# Patient Record
Sex: MALE | Race: WHITE | NOT HISPANIC OR LATINO | Employment: FULL TIME | URBAN - METROPOLITAN AREA
[De-identification: names, ages, dates, MRNs, and addresses within clinical notes are randomized per-mention and may not be internally consistent; named-entity substitution may affect disease eponyms.]

---

## 2019-03-18 ENCOUNTER — HOSPITAL ENCOUNTER (EMERGENCY)
Facility: HOSPITAL | Age: 29
Discharge: HOME | End: 2019-03-18
Attending: EMERGENCY MEDICINE | Admitting: EMERGENCY MEDICINE

## 2019-03-18 VITALS
TEMPERATURE: 99 F | DIASTOLIC BLOOD PRESSURE: 72 MMHG | SYSTOLIC BLOOD PRESSURE: 144 MMHG | WEIGHT: 299.83 LBS | RESPIRATION RATE: 18 BRPM | OXYGEN SATURATION: 99 % | HEART RATE: 95 BPM

## 2019-03-18 DIAGNOSIS — L03.313 CELLULITIS OF CHEST WALL: Primary | ICD-10-CM

## 2019-03-18 LAB
ALBUMIN SERPL BCP-MCNC: 3.5 G/DL (ref 3.5–5)
ALP SERPL-CCNC: 80 U/L (ref 46–116)
ALT SERPL W P-5'-P-CCNC: 74 U/L (ref 12–78)
ANION GAP SERPL CALCULATED.3IONS-SCNC: 10 MMOL/L (ref 4–13)
AST SERPL W P-5'-P-CCNC: 45 U/L (ref 5–45)
BASOPHILS # BLD AUTO: 0.03 THOUSANDS/ΜL (ref 0–0.1)
BASOPHILS NFR BLD AUTO: 0 % (ref 0–1)
BILIRUB DIRECT SERPL-MCNC: 0.17 MG/DL (ref 0–0.2)
BILIRUB SERPL-MCNC: 0.58 MG/DL (ref 0.2–1)
BUN SERPL-MCNC: 9 MG/DL (ref 5–25)
CALCIUM SERPL-MCNC: 9.1 MG/DL (ref 8.3–10.1)
CHLORIDE SERPL-SCNC: 101 MMOL/L (ref 100–108)
CO2 SERPL-SCNC: 25 MMOL/L (ref 21–32)
CREAT SERPL-MCNC: 0.96 MG/DL (ref 0.6–1.3)
EOSINOPHIL # BLD AUTO: 0 THOUSAND/ΜL (ref 0–0.61)
EOSINOPHIL NFR BLD AUTO: 0 % (ref 0–6)
ERYTHROCYTE [DISTWIDTH] IN BLOOD BY AUTOMATED COUNT: 13.3 % (ref 11.6–15.1)
GFR SERPL CREATININE-BSD FRML MDRD: 106 ML/MIN/1.73SQ M
GLUCOSE SERPL-MCNC: 85 MG/DL (ref 65–140)
HCT VFR BLD AUTO: 47.6 % (ref 36.5–49.3)
HGB BLD-MCNC: 15.8 G/DL (ref 12–17)
IMM GRANULOCYTES # BLD AUTO: 0.1 THOUSAND/UL (ref 0–0.2)
IMM GRANULOCYTES NFR BLD AUTO: 1 % (ref 0–2)
LYMPHOCYTES # BLD AUTO: 1.29 THOUSANDS/ΜL (ref 0.6–4.47)
LYMPHOCYTES NFR BLD AUTO: 16 % (ref 14–44)
MAGNESIUM SERPL-MCNC: 2 MG/DL (ref 1.6–2.6)
MCH RBC QN AUTO: 29.1 PG (ref 26.8–34.3)
MCHC RBC AUTO-ENTMCNC: 33.2 G/DL (ref 31.4–37.4)
MCV RBC AUTO: 88 FL (ref 82–98)
MONOCYTES # BLD AUTO: 0.77 THOUSAND/ΜL (ref 0.17–1.22)
MONOCYTES NFR BLD AUTO: 10 % (ref 4–12)
NEUTROPHILS # BLD AUTO: 5.77 THOUSANDS/ΜL (ref 1.85–7.62)
NEUTS SEG NFR BLD AUTO: 73 % (ref 43–75)
NRBC BLD AUTO-RTO: 0 /100 WBCS
PLATELET # BLD AUTO: 193 THOUSANDS/UL (ref 149–390)
PMV BLD AUTO: 9.6 FL (ref 8.9–12.7)
POTASSIUM SERPL-SCNC: 3.9 MMOL/L (ref 3.5–5.3)
PROT SERPL-MCNC: 8.2 G/DL (ref 6.4–8.2)
RBC # BLD AUTO: 5.43 MILLION/UL (ref 3.88–5.62)
SODIUM SERPL-SCNC: 136 MMOL/L (ref 136–145)
WBC # BLD AUTO: 7.96 THOUSAND/UL (ref 4.31–10.16)

## 2019-03-18 PROCEDURE — 96375 TX/PRO/DX INJ NEW DRUG ADDON: CPT

## 2019-03-18 PROCEDURE — 99283 EMERGENCY DEPT VISIT LOW MDM: CPT

## 2019-03-18 PROCEDURE — 36415 COLL VENOUS BLD VENIPUNCTURE: CPT | Performed by: EMERGENCY MEDICINE

## 2019-03-18 PROCEDURE — 80048 BASIC METABOLIC PNL TOTAL CA: CPT | Performed by: EMERGENCY MEDICINE

## 2019-03-18 PROCEDURE — 83735 ASSAY OF MAGNESIUM: CPT | Performed by: EMERGENCY MEDICINE

## 2019-03-18 PROCEDURE — 80076 HEPATIC FUNCTION PANEL: CPT | Performed by: EMERGENCY MEDICINE

## 2019-03-18 PROCEDURE — 85025 COMPLETE CBC W/AUTO DIFF WBC: CPT | Performed by: EMERGENCY MEDICINE

## 2019-03-18 PROCEDURE — 96361 HYDRATE IV INFUSION ADD-ON: CPT

## 2019-03-18 PROCEDURE — 96365 THER/PROPH/DIAG IV INF INIT: CPT

## 2019-03-18 RX ORDER — SULFAMETHOXAZOLE AND TRIMETHOPRIM 800; 160 MG/1; MG/1
1 TABLET ORAL 2 TIMES DAILY
Qty: 20 TABLET | Refills: 0 | Status: SHIPPED | OUTPATIENT
Start: 2019-03-18 | End: 2019-03-24 | Stop reason: HOSPADM

## 2019-03-18 RX ORDER — CLINDAMYCIN PHOSPHATE 600 MG/50ML
600 INJECTION INTRAVENOUS ONCE
Status: COMPLETED | OUTPATIENT
Start: 2019-03-18 | End: 2019-03-18

## 2019-03-18 RX ORDER — CEPHALEXIN 500 MG/1
500 CAPSULE ORAL EVERY 6 HOURS SCHEDULED
Qty: 40 CAPSULE | Refills: 0 | Status: ON HOLD | OUTPATIENT
Start: 2019-03-18 | End: 2019-03-24 | Stop reason: SDUPTHER

## 2019-03-18 RX ORDER — KETOROLAC TROMETHAMINE 30 MG/ML
15 INJECTION, SOLUTION INTRAMUSCULAR; INTRAVENOUS ONCE
Status: COMPLETED | OUTPATIENT
Start: 2019-03-18 | End: 2019-03-18

## 2019-03-18 RX ADMIN — SODIUM CHLORIDE 1000 ML: 0.9 INJECTION, SOLUTION INTRAVENOUS at 16:39

## 2019-03-18 RX ADMIN — CLINDAMYCIN PHOSPHATE 600 MG: 600 INJECTION, SOLUTION INTRAVENOUS at 16:49

## 2019-03-18 RX ADMIN — KETOROLAC TROMETHAMINE 15 MG: 30 INJECTION, SOLUTION INTRAMUSCULAR; INTRAVENOUS at 17:30

## 2019-03-18 NOTE — DISCHARGE INSTRUCTIONS
Take the Bactrim twice daily and the Keflex 4 times daily for the next 10 days  If, after 2 days of being on the antibiotics, the area is continuing to spread, return to the ER for re-evaluation  Call your family doctor, you should be seen in the office for further evaluation and management of your skin infection

## 2019-03-18 NOTE — ED PROVIDER NOTES
History  Chief Complaint   Patient presents with    Chest Swelling     Pts left pectoral region is swollen and reddened  Pt states that is started on saturday and since then he has had subjective fevers and intermittent vomiting  Pt denies cp/sob      34 YO male presents with pain in the Left breast for the last 2 days  States this was gradual in onset, constant  Pain has been aching and burning, non-radiating  States he has had redness and swelling in the area  Additionally notes feeling hot and cold and has had an episode of vomiting  Pt states he does feel he may be coming down with a cold as he has had runny nose, nasal congestion, fatigue  Pt has been eating and drinking without issues  Pt states similar pain to previous pilonidal cysts  Pt denies CP/SOB/D/C, no dysuria, burning on urination or blood in urine  History provided by:  Patient   used: No    Rash   Location:  Torso  Torso rash location:  L chest  Quality: redness and swelling    Quality: not blistering, not draining and not weeping    Severity:  Moderate  Duration:  2 days  Timing:  Constant  Progression:  Worsening  Chronicity:  New  Relieved by:  Nothing  Worsened by:  Nothing  Ineffective treatments:  None tried  Associated symptoms: fatigue and vomiting    Associated symptoms: no abdominal pain, no fever, no headaches, no nausea and no shortness of breath    Fatigue:     Severity:  Moderate    Duration:  2 days    Timing:  Constant  Vomiting:     Quality:  Stomach contents    Number of occurrences:  1    Severity:  Mild    Duration:  1 day    Timing:  Intermittent    Progression:  Partially resolved      None       Past Medical History:   Diagnosis Date    No known problems        Past Surgical History:   Procedure Laterality Date    FRACTURE SURGERY         History reviewed  No pertinent family history  I have reviewed and agree with the history as documented      Social History     Tobacco Use    Smoking status: Current Every Day Smoker     Packs/day: 0 50     Types: Cigarettes    Smokeless tobacco: Never Used   Substance Use Topics    Alcohol use: Yes     Comment: socially     Drug use: No        Review of Systems   Constitutional: Positive for fatigue  Negative for fever  HENT: Negative for dental problem  Eyes: Negative for visual disturbance  Respiratory: Negative for shortness of breath  Cardiovascular: Negative for chest pain  Gastrointestinal: Positive for vomiting  Negative for abdominal pain and nausea  Genitourinary: Negative for dysuria and frequency  Musculoskeletal: Negative for neck pain and neck stiffness  Skin: Positive for rash  Neurological: Negative for dizziness, weakness, light-headedness and headaches  Psychiatric/Behavioral: Negative for agitation, behavioral problems and confusion  All other systems reviewed and are negative  Physical Exam  Physical Exam   Constitutional: He is oriented to person, place, and time  He appears well-developed and well-nourished  HENT:   Head: Normocephalic and atraumatic  Eyes: Pupils are equal, round, and reactive to light  EOM are normal    Neck: Normal range of motion  Cardiovascular: Normal rate, regular rhythm and normal heart sounds  Pulmonary/Chest: Effort normal and breath sounds normal    Abdominal: Soft  There is no tenderness  Musculoskeletal: Normal range of motion  Neurological: He is alert and oriented to person, place, and time  Skin: Skin is warm and dry  Redness, erythema and induration under the Left breast  No obvious fluctuance  Psychiatric: He has a normal mood and affect  His behavior is normal    Nursing note and vitals reviewed        Vital Signs  ED Triage Vitals   Temperature Pulse Respirations Blood Pressure SpO2   03/18/19 1604 03/18/19 1604 03/18/19 1604 03/18/19 1604 03/18/19 1604   99 °F (37 2 °C) 99 18 136/95 97 %      Temp Source Heart Rate Source Patient Position - Orthostatic VS BP Location FiO2 (%)   03/18/19 1604 03/18/19 1604 03/18/19 1604 03/18/19 1604 --   Oral Monitor Sitting Right arm       Pain Score       03/18/19 1720       2           Vitals:    03/18/19 1604 03/18/19 1720   BP: 136/95 144/72   Pulse: 99 95   Patient Position - Orthostatic VS: Sitting Sitting       qSOFA     Row Name 03/18/19 1720 03/18/19 1604             Altered mental status GCS < 15  --  --       Respiratory Rate > / =22  0  0       Systolic BP < / =407  0  0       Q Sofa Score  0  0             Visual Acuity      ED Medications  Medications   sodium chloride 0 9 % bolus 1,000 mL (0 mL Intravenous Stopped 3/18/19 1743)   clindamycin (CLEOCIN) IVPB (premix) 600 mg (0 mg Intravenous Stopped 3/18/19 1720)   ketorolac (TORADOL) injection 15 mg (15 mg Intravenous Given 3/18/19 1730)       Diagnostic Studies  Results Reviewed     Procedure Component Value Units Date/Time    Basic metabolic panel [[id number]] Collected:  03/18/19 1639    Lab Status:  Final result Specimen:  Blood from Hand, Right Updated:  03/18/19 1659     Sodium 136 mmol/L      Potassium 3 9 mmol/L      Chloride 101 mmol/L      CO2 25 mmol/L      ANION GAP 10 mmol/L      BUN 9 mg/dL      Creatinine 0 96 mg/dL      Glucose 85 mg/dL      Calcium 9 1 mg/dL      eGFR 106 ml/min/1 73sq m     Narrative:       National Kidney Disease Education Program recommendations are as follows:  GFR calculation is accurate only with a steady state creatinine  Chronic Kidney disease less than 60 ml/min/1 73 sq  meters  Kidney failure less than 15 ml/min/1 73 sq  meters      Hepatic function panel [[id number]]  (Normal) Collected:  03/18/19 1639    Lab Status:  Final result Specimen:  Blood from Hand, Right Updated:  03/18/19 1659     Total Bilirubin 0 58 mg/dL      Bilirubin, Direct 0 17 mg/dL      Alkaline Phosphatase 80 U/L      AST 45 U/L      ALT 74 U/L      Total Protein 8 2 g/dL      Albumin 3 5 g/dL     Magnesium [[id number]]  (Normal) Collected:  03/18/19 1639    Lab Status:  Final result Specimen:  Blood from Hand, Right Updated:  03/18/19 1659     Magnesium 2 0 mg/dL     CBC and differential [[id number]] Collected:  03/18/19 1639    Lab Status:  Final result Specimen:  Blood from Hand, Right Updated:  03/18/19 1645     WBC 7 96 Thousand/uL      RBC 5 43 Million/uL      Hemoglobin 15 8 g/dL      Hematocrit 47 6 %      MCV 88 fL      MCH 29 1 pg      MCHC 33 2 g/dL      RDW 13 3 %      MPV 9 6 fL      Platelets 881 Thousands/uL      nRBC 0 /100 WBCs      Neutrophils Relative 73 %      Immat GRANS % 1 %      Lymphocytes Relative 16 %      Monocytes Relative 10 %      Eosinophils Relative 0 %      Basophils Relative 0 %      Neutrophils Absolute 5 77 Thousands/µL      Immature Grans Absolute 0 10 Thousand/uL      Lymphocytes Absolute 1 29 Thousands/µL      Monocytes Absolute 0 77 Thousand/µL      Eosinophils Absolute 0 00 Thousand/µL      Basophils Absolute 0 03 Thousands/µL                  No orders to display              Procedures  Procedures       Phone Contacts  ED Phone Contact    ED Course                               MDM  Number of Diagnoses or Management Options  Cellulitis of chest wall: new and requires workup  Diagnosis management comments: 1  Left breast infection - Pt with cellulitis to the Left breast  U/S was performed bedside, cobblestoning consistent with cellulitis, no obvious pocket found  Will check CBC and metabolic panel  Pt does not have 2 SIRS criteria, he is appropriate for outpatient management  Will give Rx for Bactrim and Keflex, strict return instructions         Amount and/or Complexity of Data Reviewed  Clinical lab tests: ordered and reviewed  Independent visualization of images, tracings, or specimens: yes    Patient Progress  Patient progress: stable      Disposition  Final diagnoses:   Cellulitis of chest wall     Time reflects when diagnosis was documented in both MDM as applicable and the Disposition within this note     Time User Action Codes Description Comment    3/18/2019  5:28 PM Aracely Coombs Add [L03 313] Cellulitis of chest wall       ED Disposition     ED Disposition Condition Date/Time Comment    Discharge Stable Mon Mar 18, 2019  5:27 PM Antwan Lugo discharge to home/self care  Follow-up Information     Follow up With Specialties Details Why Contact Info    Almeta Pallas, DO Family Medicine Schedule an appointment as soon as possible for a visit   [address] [facility] [address]  [phone]            Patient's Medications   Discharge Prescriptions    CEPHALEXIN (KEFLEX) 500 MG CAPSULE    Take 1 capsule (500 mg total) by mouth every 6 (six) hours for 10 days       Start Date: 3/18/2019 End Date: 3/28/2019       Order Dose: 500 mg       Quantity: 40 capsule    Refills: 0    SULFAMETHOXAZOLE-TRIMETHOPRIM (BACTRIM DS) 800-160 MG PER TABLET    Take 1 tablet by mouth 2 (two) times a day for 10 days smx-tmp DS (BACTRIM) 800-160 mg tabs (1tab q12 D10)       Start Date: 3/18/2019 End Date: 3/28/2019       Order Dose: 1 tablet       Quantity: 20 tablet    Refills: 0     No discharge procedures on file      ED Provider  Electronically Signed by           Susan Goodwin MD  03/18/19 0398

## 2019-03-20 ENCOUNTER — HOSPITAL ENCOUNTER (INPATIENT)
Facility: HOSPITAL | Age: 29
LOS: 4 days | Discharge: HOME | DRG: 853 | End: 2019-03-24
Attending: EMERGENCY MEDICINE | Admitting: FAMILY MEDICINE

## 2019-03-20 ENCOUNTER — APPOINTMENT (EMERGENCY)
Dept: CT IMAGING | Facility: HOSPITAL | Age: 29
DRG: 853 | End: 2019-03-20

## 2019-03-20 DIAGNOSIS — L03.313 CELLULITIS OF CHEST WALL: ICD-10-CM

## 2019-03-20 DIAGNOSIS — N61.1 ABSCESS OF LEFT BREAST: Primary | ICD-10-CM

## 2019-03-20 DIAGNOSIS — A41.9 SEPSIS (HCC): ICD-10-CM

## 2019-03-20 DIAGNOSIS — J18.9 RIGHT LOWER LOBE PNEUMONIA: ICD-10-CM

## 2019-03-20 DIAGNOSIS — N61.0 CELLULITIS OF LEFT BREAST: ICD-10-CM

## 2019-03-20 PROBLEM — E66.01 MORBID OBESITY WITH BMI OF 40.0-44.9, ADULT (HCC): Status: ACTIVE | Noted: 2019-03-20

## 2019-03-20 PROBLEM — L03.90 CELLULITIS: Status: ACTIVE | Noted: 2019-03-20

## 2019-03-20 LAB
ALBUMIN SERPL BCP-MCNC: 3 G/DL (ref 3.5–5)
ALP SERPL-CCNC: 61 U/L (ref 46–116)
ALT SERPL W P-5'-P-CCNC: 56 U/L (ref 12–78)
ANION GAP SERPL CALCULATED.3IONS-SCNC: 11 MMOL/L (ref 4–13)
APTT PPP: 36 SECONDS (ref 26–38)
AST SERPL W P-5'-P-CCNC: 65 U/L (ref 5–45)
ATRIAL RATE: 97 BPM
BACTERIA UR QL AUTO: ABNORMAL /HPF
BASOPHILS # BLD AUTO: 0.01 THOUSANDS/ΜL (ref 0–0.1)
BASOPHILS NFR BLD AUTO: 0 % (ref 0–1)
BILIRUB SERPL-MCNC: 0.45 MG/DL (ref 0.2–1)
BILIRUB UR QL STRIP: NEGATIVE
BUN SERPL-MCNC: 9 MG/DL (ref 5–25)
CALCIUM SERPL-MCNC: 8.5 MG/DL (ref 8.3–10.1)
CHLORIDE SERPL-SCNC: 98 MMOL/L (ref 100–108)
CLARITY UR: CLEAR
CO2 SERPL-SCNC: 22 MMOL/L (ref 21–32)
COLOR UR: YELLOW
CREAT SERPL-MCNC: 1.03 MG/DL (ref 0.6–1.3)
EOSINOPHIL # BLD AUTO: 0 THOUSAND/ΜL (ref 0–0.61)
EOSINOPHIL NFR BLD AUTO: 0 % (ref 0–6)
ERYTHROCYTE [DISTWIDTH] IN BLOOD BY AUTOMATED COUNT: 13.2 % (ref 11.6–15.1)
GFR SERPL CREATININE-BSD FRML MDRD: 98 ML/MIN/1.73SQ M
GLUCOSE SERPL-MCNC: 94 MG/DL (ref 65–140)
GLUCOSE UR STRIP-MCNC: NEGATIVE MG/DL
HCT VFR BLD AUTO: 40.4 % (ref 36.5–49.3)
HGB BLD-MCNC: 13.8 G/DL (ref 12–17)
HGB UR QL STRIP.AUTO: ABNORMAL
IMM GRANULOCYTES # BLD AUTO: 0.14 THOUSAND/UL (ref 0–0.2)
IMM GRANULOCYTES NFR BLD AUTO: 2 % (ref 0–2)
INR PPP: 1.18 (ref 0.86–1.17)
KETONES UR STRIP-MCNC: NEGATIVE MG/DL
LACTATE SERPL-SCNC: 0.9 MMOL/L (ref 0.5–2)
LEUKOCYTE ESTERASE UR QL STRIP: NEGATIVE
LYMPHOCYTES # BLD AUTO: 0.85 THOUSANDS/ΜL (ref 0.6–4.47)
LYMPHOCYTES NFR BLD AUTO: 13 % (ref 14–44)
MCH RBC QN AUTO: 29.2 PG (ref 26.8–34.3)
MCHC RBC AUTO-ENTMCNC: 34.2 G/DL (ref 31.4–37.4)
MCV RBC AUTO: 86 FL (ref 82–98)
MONOCYTES # BLD AUTO: 0.43 THOUSAND/ΜL (ref 0.17–1.22)
MONOCYTES NFR BLD AUTO: 7 % (ref 4–12)
NEUTROPHILS # BLD AUTO: 5 THOUSANDS/ΜL (ref 1.85–7.62)
NEUTS SEG NFR BLD AUTO: 78 % (ref 43–75)
NITRITE UR QL STRIP: NEGATIVE
NON-SQ EPI CELLS URNS QL MICRO: ABNORMAL /HPF
NRBC BLD AUTO-RTO: 0 /100 WBCS
P AXIS: 50 DEGREES
PH UR STRIP.AUTO: 7 [PH] (ref 4.5–8)
PLATELET # BLD AUTO: 165 THOUSANDS/UL (ref 149–390)
PMV BLD AUTO: 10.5 FL (ref 8.9–12.7)
POTASSIUM SERPL-SCNC: 3.7 MMOL/L (ref 3.5–5.3)
PR INTERVAL: 154 MS
PROCALCITONIN SERPL-MCNC: 0.17 NG/ML
PROT SERPL-MCNC: 7.6 G/DL (ref 6.4–8.2)
PROT UR STRIP-MCNC: ABNORMAL MG/DL
PROTHROMBIN TIME: 15.1 SECONDS (ref 11.8–14.2)
QRS AXIS: 58 DEGREES
QRSD INTERVAL: 92 MS
QT INTERVAL: 326 MS
QTC INTERVAL: 414 MS
RBC # BLD AUTO: 4.72 MILLION/UL (ref 3.88–5.62)
RBC #/AREA URNS AUTO: ABNORMAL /HPF
SODIUM SERPL-SCNC: 131 MMOL/L (ref 136–145)
SP GR UR STRIP.AUTO: 1.01 (ref 1–1.03)
T WAVE AXIS: 24 DEGREES
UROBILINOGEN UR QL STRIP.AUTO: 1 E.U./DL
VENTRICULAR RATE: 97 BPM
WBC # BLD AUTO: 6.43 THOUSAND/UL (ref 4.31–10.16)
WBC #/AREA URNS AUTO: ABNORMAL /HPF

## 2019-03-20 PROCEDURE — 71260 CT THORAX DX C+: CPT

## 2019-03-20 PROCEDURE — 81001 URINALYSIS AUTO W/SCOPE: CPT

## 2019-03-20 PROCEDURE — 93010 ELECTROCARDIOGRAM REPORT: CPT | Performed by: INTERNAL MEDICINE

## 2019-03-20 PROCEDURE — 99285 EMERGENCY DEPT VISIT HI MDM: CPT

## 2019-03-20 PROCEDURE — 83605 ASSAY OF LACTIC ACID: CPT | Performed by: EMERGENCY MEDICINE

## 2019-03-20 PROCEDURE — 85730 THROMBOPLASTIN TIME PARTIAL: CPT | Performed by: EMERGENCY MEDICINE

## 2019-03-20 PROCEDURE — 93005 ELECTROCARDIOGRAM TRACING: CPT

## 2019-03-20 PROCEDURE — 96365 THER/PROPH/DIAG IV INF INIT: CPT

## 2019-03-20 PROCEDURE — 36415 COLL VENOUS BLD VENIPUNCTURE: CPT | Performed by: EMERGENCY MEDICINE

## 2019-03-20 PROCEDURE — 85610 PROTHROMBIN TIME: CPT | Performed by: EMERGENCY MEDICINE

## 2019-03-20 PROCEDURE — 87449 NOS EACH ORGANISM AG IA: CPT | Performed by: PHYSICIAN ASSISTANT

## 2019-03-20 PROCEDURE — 81003 URINALYSIS AUTO W/O SCOPE: CPT

## 2019-03-20 PROCEDURE — 87040 BLOOD CULTURE FOR BACTERIA: CPT | Performed by: EMERGENCY MEDICINE

## 2019-03-20 PROCEDURE — 87631 RESP VIRUS 3-5 TARGETS: CPT | Performed by: EMERGENCY MEDICINE

## 2019-03-20 PROCEDURE — 84145 PROCALCITONIN (PCT): CPT | Performed by: EMERGENCY MEDICINE

## 2019-03-20 PROCEDURE — 85025 COMPLETE CBC W/AUTO DIFF WBC: CPT | Performed by: EMERGENCY MEDICINE

## 2019-03-20 PROCEDURE — 99223 1ST HOSP IP/OBS HIGH 75: CPT | Performed by: PHYSICIAN ASSISTANT

## 2019-03-20 PROCEDURE — 80053 COMPREHEN METABOLIC PANEL: CPT | Performed by: EMERGENCY MEDICINE

## 2019-03-20 RX ORDER — NICOTINE 21 MG/24HR
1 PATCH, TRANSDERMAL 24 HOURS TRANSDERMAL DAILY
Status: DISCONTINUED | OUTPATIENT
Start: 2019-03-21 | End: 2019-03-24 | Stop reason: HOSPADM

## 2019-03-20 RX ORDER — ACETAMINOPHEN 325 MG/1
650 TABLET ORAL EVERY 6 HOURS PRN
Status: DISCONTINUED | OUTPATIENT
Start: 2019-03-20 | End: 2019-03-24 | Stop reason: HOSPADM

## 2019-03-20 RX ORDER — CLOTRIMAZOLE AND BETAMETHASONE DIPROPIONATE 10; .64 MG/G; MG/G
CREAM TOPICAL 2 TIMES DAILY
Status: DISCONTINUED | OUTPATIENT
Start: 2019-03-20 | End: 2019-03-23

## 2019-03-20 RX ORDER — IBUPROFEN 400 MG/1
400 TABLET ORAL ONCE
Status: COMPLETED | OUTPATIENT
Start: 2019-03-20 | End: 2019-03-20

## 2019-03-20 RX ORDER — BENZONATATE 100 MG/1
100 CAPSULE ORAL 3 TIMES DAILY PRN
Status: DISCONTINUED | OUTPATIENT
Start: 2019-03-20 | End: 2019-03-24 | Stop reason: HOSPADM

## 2019-03-20 RX ORDER — ONDANSETRON 2 MG/ML
4 INJECTION INTRAMUSCULAR; INTRAVENOUS EVERY 6 HOURS PRN
Status: DISCONTINUED | OUTPATIENT
Start: 2019-03-20 | End: 2019-03-24 | Stop reason: HOSPADM

## 2019-03-20 RX ORDER — SODIUM CHLORIDE 9 MG/ML
100 INJECTION, SOLUTION INTRAVENOUS CONTINUOUS
Status: DISCONTINUED | OUTPATIENT
Start: 2019-03-20 | End: 2019-03-22

## 2019-03-20 RX ORDER — LEVOFLOXACIN 5 MG/ML
750 INJECTION, SOLUTION INTRAVENOUS ONCE
Status: COMPLETED | OUTPATIENT
Start: 2019-03-20 | End: 2019-03-21

## 2019-03-20 RX ADMIN — VANCOMYCIN HYDROCHLORIDE 2000 MG: 1 INJECTION, POWDER, LYOPHILIZED, FOR SOLUTION INTRAVENOUS at 19:32

## 2019-03-20 RX ADMIN — LEVOFLOXACIN 750 MG: 5 INJECTION, SOLUTION INTRAVENOUS at 22:45

## 2019-03-20 RX ADMIN — IOHEXOL 85 ML: 350 INJECTION, SOLUTION INTRAVENOUS at 20:13

## 2019-03-20 RX ADMIN — IBUPROFEN 400 MG: 400 TABLET ORAL at 18:48

## 2019-03-20 RX ADMIN — SODIUM CHLORIDE 1000 ML: 0.9 INJECTION, SOLUTION INTRAVENOUS at 19:13

## 2019-03-20 RX ADMIN — ONDANSETRON 4 MG: 2 INJECTION INTRAMUSCULAR; INTRAVENOUS at 23:15

## 2019-03-20 RX ADMIN — SODIUM CHLORIDE 1000 ML: 0.9 INJECTION, SOLUTION INTRAVENOUS at 22:03

## 2019-03-20 NOTE — SEPSIS NOTE
Sepsis Note   Margarito Peña 33 y o  male MRN: [id number]  Unit/Bed#: ED 24 Encounter: [id number]      qSOFA     Row Name 03/20/19 1833                Altered mental status GCS < 15          Respiratory Rate > / =83  1        Systolic BP < / =347  0        Q Sofa Score  1            Initial Sepsis Screening     Row Name 03/20/19 1900                Is the patient's history suggestive of a new or worsening infection? Yes (Proceed)  (Abnormal)   -SP        Suspected source of infection  soft tissue  -SP        Are two or more of the following signs & symptoms of infection both present and new to the patient? Yes (Proceed)  (Abnormal)   -SP        Indicate SIRS criteria  Hyperthemia > 38 3C (100 9F); Tachycardia > 90 bpm  -SP        If the answer is yes to both questions, suspicion of sepsis is present          If severe sepsis is present AND tissue hypoperfusion perists in the hour after fluid resuscitation or lactate > 4, the patient meets criteria for SEPTIC SHOCK          Are any of the following organ dysfunction criteria present within 6 hours of suspected infection and SIRS criteria that are NOT considered to be chronic conditions? No  -SP        Organ dysfunction          Date of presentation of severe sepsis          Time of presentation of severe sepsis          Tissue hypoperfusion persists in the hour after crystalloid fluid administration, evidenced, by either:          Was hypotension present within one hour of the conclusion of crystalloid fluid administration?           Date of presentation of septic shock          Time of presentation of septic shock            User Key  (r) = Recorded By, (t) = Taken By, (c) = Cosigned By    [address] Name Provider Type    SP Quinton Gonzales, DO Physician

## 2019-03-21 ENCOUNTER — APPOINTMENT (INPATIENT)
Dept: ULTRASOUND IMAGING | Facility: HOSPITAL | Age: 29
DRG: 853 | End: 2019-03-21

## 2019-03-21 PROBLEM — N61.1 ABSCESS OF LEFT BREAST: Status: ACTIVE | Noted: 2019-03-21

## 2019-03-21 LAB
ALBUMIN SERPL BCP-MCNC: 2.6 G/DL (ref 3.5–5)
ALP SERPL-CCNC: 52 U/L (ref 46–116)
ALT SERPL W P-5'-P-CCNC: 53 U/L (ref 12–78)
ANION GAP SERPL CALCULATED.3IONS-SCNC: 13 MMOL/L (ref 4–13)
AST SERPL W P-5'-P-CCNC: 64 U/L (ref 5–45)
BILIRUB SERPL-MCNC: 0.47 MG/DL (ref 0.2–1)
BUN SERPL-MCNC: 8 MG/DL (ref 5–25)
CALCIUM SERPL-MCNC: 8.1 MG/DL (ref 8.3–10.1)
CHLORIDE SERPL-SCNC: 99 MMOL/L (ref 100–108)
CO2 SERPL-SCNC: 20 MMOL/L (ref 21–32)
CREAT SERPL-MCNC: 0.87 MG/DL (ref 0.6–1.3)
ERYTHROCYTE [DISTWIDTH] IN BLOOD BY AUTOMATED COUNT: 13.3 % (ref 11.6–15.1)
FLUAV AG SPEC QL: NOT DETECTED
FLUBV AG SPEC QL: NOT DETECTED
GFR SERPL CREATININE-BSD FRML MDRD: 117 ML/MIN/1.73SQ M
GLUCOSE SERPL-MCNC: 96 MG/DL (ref 65–140)
HCT VFR BLD AUTO: 38.5 % (ref 36.5–49.3)
HGB BLD-MCNC: 12.7 G/DL (ref 12–17)
L PNEUMO1 AG UR QL IA.RAPID: NEGATIVE
MCH RBC QN AUTO: 28.6 PG (ref 26.8–34.3)
MCHC RBC AUTO-ENTMCNC: 33 G/DL (ref 31.4–37.4)
MCV RBC AUTO: 87 FL (ref 82–98)
PLATELET # BLD AUTO: 148 THOUSANDS/UL (ref 149–390)
PMV BLD AUTO: 10.5 FL (ref 8.9–12.7)
POTASSIUM SERPL-SCNC: 3.6 MMOL/L (ref 3.5–5.3)
PROT SERPL-MCNC: 6.8 G/DL (ref 6.4–8.2)
RBC # BLD AUTO: 4.44 MILLION/UL (ref 3.88–5.62)
RSV B RNA SPEC QL NAA+PROBE: NOT DETECTED
S PNEUM AG UR QL: NEGATIVE
SODIUM SERPL-SCNC: 132 MMOL/L (ref 136–145)
WBC # BLD AUTO: 5.36 THOUSAND/UL (ref 4.31–10.16)

## 2019-03-21 PROCEDURE — 76642 ULTRASOUND BREAST LIMITED: CPT

## 2019-03-21 PROCEDURE — 0J960ZZ DRAINAGE OF CHEST SUBCUTANEOUS TISSUE AND FASCIA, OPEN APPROACH: ICD-10-PCS | Performed by: SURGERY

## 2019-03-21 PROCEDURE — 99254 IP/OBS CNSLTJ NEW/EST MOD 60: CPT | Performed by: INTERNAL MEDICINE

## 2019-03-21 PROCEDURE — 87070 CULTURE OTHR SPECIMN AEROBIC: CPT | Performed by: PHYSICIAN ASSISTANT

## 2019-03-21 PROCEDURE — 87070 CULTURE OTHR SPECIMN AEROBIC: CPT | Performed by: SURGERY

## 2019-03-21 PROCEDURE — 99243 OFF/OP CNSLTJ NEW/EST LOW 30: CPT | Performed by: SURGERY

## 2019-03-21 PROCEDURE — 80053 COMPREHEN METABOLIC PANEL: CPT | Performed by: PHYSICIAN ASSISTANT

## 2019-03-21 PROCEDURE — 87205 SMEAR GRAM STAIN: CPT | Performed by: SURGERY

## 2019-03-21 PROCEDURE — 85027 COMPLETE CBC AUTOMATED: CPT | Performed by: PHYSICIAN ASSISTANT

## 2019-03-21 PROCEDURE — 99232 SBSQ HOSP IP/OBS MODERATE 35: CPT | Performed by: FAMILY MEDICINE

## 2019-03-21 PROCEDURE — 90686 IIV4 VACC NO PRSV 0.5 ML IM: CPT | Performed by: FAMILY MEDICINE

## 2019-03-21 PROCEDURE — 87205 SMEAR GRAM STAIN: CPT | Performed by: PHYSICIAN ASSISTANT

## 2019-03-21 PROCEDURE — 87493 C DIFF AMPLIFIED PROBE: CPT | Performed by: FAMILY MEDICINE

## 2019-03-21 PROCEDURE — 10060 I&D ABSCESS SIMPLE/SINGLE: CPT | Performed by: SURGERY

## 2019-03-21 RX ORDER — LIDOCAINE HYDROCHLORIDE AND EPINEPHRINE 10; 10 MG/ML; UG/ML
40 INJECTION, SOLUTION INFILTRATION; PERINEURAL ONCE
Status: COMPLETED | OUTPATIENT
Start: 2019-03-21 | End: 2019-03-21

## 2019-03-21 RX ORDER — DIPHENHYDRAMINE HCL 25 MG
50 TABLET ORAL ONCE
Status: COMPLETED | OUTPATIENT
Start: 2019-03-21 | End: 2019-03-21

## 2019-03-21 RX ORDER — DIPHENHYDRAMINE HCL 25 MG
25 TABLET ORAL
Status: DISCONTINUED | OUTPATIENT
Start: 2019-03-21 | End: 2019-03-24 | Stop reason: HOSPADM

## 2019-03-21 RX ADMIN — CLOTRIMAZOLE AND BETAMETHASONE DIPROPIONATE: 10; .64 CREAM TOPICAL at 01:36

## 2019-03-21 RX ADMIN — DIPHENHYDRAMINE HCL 50 MG: 25 TABLET, FILM COATED ORAL at 04:26

## 2019-03-21 RX ADMIN — ACETAMINOPHEN 650 MG: 325 TABLET ORAL at 16:08

## 2019-03-21 RX ADMIN — ACETAMINOPHEN 650 MG: 325 TABLET ORAL at 00:49

## 2019-03-21 RX ADMIN — DIPHENHYDRAMINE HCL 25 MG: 25 TABLET, FILM COATED ORAL at 21:57

## 2019-03-21 RX ADMIN — CLOTRIMAZOLE AND BETAMETHASONE DIPROPIONATE: 10; .64 CREAM TOPICAL at 08:06

## 2019-03-21 RX ADMIN — VANCOMYCIN HYDROCHLORIDE 1500 MG: 5 INJECTION, POWDER, LYOPHILIZED, FOR SOLUTION INTRAVENOUS at 17:01

## 2019-03-21 RX ADMIN — ONDANSETRON 4 MG: 2 INJECTION INTRAMUSCULAR; INTRAVENOUS at 12:44

## 2019-03-21 RX ADMIN — ACETAMINOPHEN 650 MG: 325 TABLET ORAL at 08:05

## 2019-03-21 RX ADMIN — NICOTINE 1 PATCH: 14 PATCH, EXTENDED RELEASE TRANSDERMAL at 08:05

## 2019-03-21 RX ADMIN — CEFEPIME HYDROCHLORIDE 2000 MG: 1 INJECTION, POWDER, FOR SOLUTION INTRAMUSCULAR; INTRAVENOUS at 08:05

## 2019-03-21 RX ADMIN — ENOXAPARIN SODIUM 40 MG: 40 INJECTION SUBCUTANEOUS at 08:05

## 2019-03-21 RX ADMIN — LIDOCAINE HYDROCHLORIDE AND EPINEPHRINE 40 ML: 10; 10 INJECTION, SOLUTION INFILTRATION; PERINEURAL at 14:41

## 2019-03-21 RX ADMIN — SODIUM CHLORIDE 100 ML/HR: 0.9 INJECTION, SOLUTION INTRAVENOUS at 12:47

## 2019-03-21 RX ADMIN — INFLUENZA VIRUS VACCINE 0.5 ML: 15; 15; 15; 15 SUSPENSION INTRAMUSCULAR at 14:34

## 2019-03-21 RX ADMIN — SODIUM CHLORIDE 100 ML/HR: 0.9 INJECTION, SOLUTION INTRAVENOUS at 01:36

## 2019-03-21 NOTE — SEPSIS NOTE
Sepsis Note   Lilly Melendez 33 y o  male MRN: [id number]  Unit/Bed#: E5 -01 Encounter: [id number]      qSOFA     Row Name 03/20/19 2203 03/20/19 2019 03/20/19 1833          Altered mental status GCS < 15            Respiratory Rate > / =22  0  0  1      Systolic BP < / =779  0  0  0      Q Sofa Score  0  0  1          Initial Sepsis Screening     Row Name 03/20/19 1900                Is the patient's history suggestive of a new or worsening infection? Yes (Proceed)  (Abnormal)   -SP        Suspected source of infection  soft tissue  -SP        Are two or more of the following signs & symptoms of infection both present and new to the patient? Yes (Proceed)  (Abnormal)   -SP        Indicate SIRS criteria  Hyperthemia > 38 3C (100 9F); Tachycardia > 90 bpm  -SP        If the answer is yes to both questions, suspicion of sepsis is present          If severe sepsis is present AND tissue hypoperfusion perists in the hour after fluid resuscitation or lactate > 4, the patient meets criteria for SEPTIC SHOCK          Are any of the following organ dysfunction criteria present within 6 hours of suspected infection and SIRS criteria that are NOT considered to be chronic conditions? No  -SP        Organ dysfunction          Date of presentation of severe sepsis          Time of presentation of severe sepsis          Tissue hypoperfusion persists in the hour after crystalloid fluid administration, evidenced, by either:          Was hypotension present within one hour of the conclusion of crystalloid fluid administration?           Date of presentation of septic shock          Time of presentation of septic shock            User Key  (r) = Recorded By, (t) = Taken By, (c) = Cosigned By    [address] Name Provider Type    SP Johnny Mcpherson DO Physician               Default Flowsheet Data (last 720 hours)      Sepsis Reassess     Row Name 03/20/19 2030                   Repeat Volume Status and Tissue Perfusion Assessment Performed    Repeat Volume Status and Tissue Perfusion Assessment Performed  Yes  -SP           Volume Status and Tissue Perfusion Post Fluid Resuscitation * Must Document All *    Vital Signs Reviewed (HR, RR, BP, T)  Yes  -SP        Shock Index Reviewed  Yes  -SP        Arterial Oxygen Saturation Reviewed (POx, SaO2 or SpO2)  Yes (comment %)  -SP        Cardio  Normal S1/S2; Regular rate and rhythm  -SP        Pulmonary  Normal effort;Rhonchi  (Abnormal)   -SP        Capillary Refill  Brisk  -SP        Peripheral Pulses  Radial;Dorsalis Pedis  -SP        Peripheral Pulse  +2  -SP        Dorsalis Pedis  +2  -SP        Skin  Warm;Dry  -SP        Urine output assessed  Adequate  -SP           *OR*   Intensive Monitoring- Must Document One of the Following Four *:    Vital Signs Reviewed          * Central Venous Pressure (CVP or RAP)          * Central Venous Oxygen (SVO2, ScvO2 or Oxygen saturation via central catheter)          * Bedside Cardiovascular US in IVC diameter and % collapse          * Passive Leg Raise OR Crystalloid Challenge            User Key  (r) = Recorded By, (t) = Taken By, (c) = Cosigned By    Initials Name Provider Type    SP ColDO hanny Physician

## 2019-03-21 NOTE — PROGRESS NOTES
Vancomycin Assessment    Antwan Thakkar is a 33 y o  male who is currently receiving vancomycin 2000mg IV every 12 hours for sepsis   Relevant clinical data and objective history reviewed:  Creatinine   Date Value Ref Range Status   03/21/2019 0 87 0 60 - 1 30 mg/dL Final     Comment:     Standardized to IDMS reference method   03/20/2019 1 03 0 60 - 1 30 mg/dL Final     Comment:     Standardized to IDMS reference method   03/18/2019 0 96 0 60 - 1 30 mg/dL Final     Comment:     Standardized to IDMS reference method     /59 (BP Location: Left arm)   Pulse 101   Temp (!) 101 3 °F (38 5 °C) (Temporal)   Resp 18   Ht 5' 10 98" (1 803 m)   Wt (!) 137 kg (300 lb 14 9 oz)   SpO2 93%   BMI 41 99 kg/m²   No intake/output data recorded  Lab Results   Component Value Date/Time    BUN 8 03/21/2019 04:37 AM    WBC 5 36 03/21/2019 04:37 AM    HGB 12 7 03/21/2019 04:37 AM    HCT 38 5 03/21/2019 04:37 AM    MCV 87 03/21/2019 04:37 AM     (L) 03/21/2019 04:37 AM     Temp Readings from Last 3 Encounters:   03/21/19 (!) 101 3 °F (38 5 °C) (Temporal)   03/18/19 99 °F (37 2 °C) (Oral)   05/24/16 98 4 °F (36 9 °C) (Oral)     Vancomycin Days of Therapy: 2    Assessment/Plan  The patient is currently on vancomycin utilizing scheduled dosing based on adjusted body weight (due to obesity)  Baseline risks associated with therapy include: concomitant nephrotoxic medications  The patient is currently receiving 2000mg IV every 12 hours and after clinical evaluation will be changed to 1500mg IV every 8 hours  Pharmacy will also follow closely for s/sx of nephrotoxicity, infusion reactions and appropriateness of therapy  BMP and CBC will be ordered per protocol  Plan for trough as patient approaches steady state, prior to the 5th dose at approximately 1615 on 3/22/19  Due to infection severity, will target a trough of 15-20 (appropriate for most indications)     Pharmacy will continue to follow the patient?s culture results and clinical progress daily  [facility]  D

## 2019-03-21 NOTE — ASSESSMENT & PLAN NOTE
Patient's sepsis on presentation likely due to combination of community-acquired pneumonia and left breast abscess  CT chest with contrast showed right lower lobe pneumonia with 3 6 x 1 7 cm lesion of the left breast in close proximity to the dermis  Possibly an abscess  Ultrasound of left breast showed organizing heterogenous collection containing a large amount of debris in the area of palpable clinical concern consistent with an abscess  Patient has been maintained on IV cefepime and vancomycin since admission  Infectious Disease consult has been placed  Blood cultures pending, influenza A and B and RSV PCR negative, and urine for Legionella antigen and strep pneumo both negative  Procalcitonin 0 17

## 2019-03-21 NOTE — PROGRESS NOTES
Progress Note - Antwan Lugo 1990, 33 y o  male MRN: [id number]    Unit/Bed#: E5 -01 Encounter: [id number]    Primary Care Provider: Nikhil Cheema DO   Date and time admitted to hospital: 3/20/2019  6:33 PM        * Sepsis [facility])  Assessment & Plan  Patient's sepsis on presentation likely due to combination of community-acquired pneumonia and left breast abscess  CT chest with contrast showed right lower lobe pneumonia with 3 6 x 1 7 cm lesion of the left breast in close proximity to the dermis  Possibly an abscess  Ultrasound of left breast showed organizing heterogenous collection containing a large amount of debris in the area of palpable clinical concern consistent with an abscess  Patient has been maintained on IV cefepime and vancomycin since admission  Infectious Disease consult has been placed  Blood cultures pending, influenza A and B and RSV PCR negative, and urine for Legionella antigen and strep pneumo both negative  Procalcitonin 0 17  Abscess of left breast  Assessment & Plan  · Was recently evaluated in the ED on Monday - ultrasound at bedside did not show any focal abscess  Discharged home on oral keflex and bactrim which patient reports compliance  Cellulitic area was outlined to monitor progression  · Patient states the redness is slightly worse; painful to the touch  No drainage noted, no induration  · CT chest: "3 6 x 1 7 cm lesion of the left breast in close proximity to the dermis  It cannot be determined on the basis of this CT if this represents a dermal lesion, phlegmon or less likely abscess  Given proximity to skin surface, ultrasound would be the preferred modality for evaluation "  · Ultrasound of left breast shows abscess formation  · Continue IV vancomycin and cefepime  Infectious Disease consult placed  · Blood cultures pending    · Will also add lotrisone topical for possible fungal component        Community acquired pneumonia  Assessment & Plan  · Patient reports worsening cough and fevers over the last several days; denies any nasal congestion/sore throat/myalgias  Did not receive a flu vaccine this year  · Met sepsis criteria at time of admission as above  · Continue IV cefepime and vancomycin  · Infectious Disease consult placed to assist with antibiotic selection  · Procalcitonin is negative at 0 17   · Urine antigens for strep pneumo and Legionella both negative  Influenza A and B and RSV PCR negative  Morbid obesity with BMI of 40 0-44 9, adult [facility])  Assessment & Plan  · Counseled patient on lifestyle modifications including diet and exercise  · May benefit from outpatient bariatric referral    Cellulitis  Assessment & Plan  · Was recently evaluated in the ED on Monday - ultrasound at bedside did not show any focal abscess  Discharged home on oral keflex and bactrim which patient reports compliance  Cellulitic area was outlined to monitor progression  · Patient states the redness is slightly worse; painful to the touch  No drainage noted, no induration  · CT chest: "3 6 x 1 7 cm lesion of the left breast in close proximity to the dermis  It cannot be determined on the basis of this CT if this represents a dermal lesion, phlegmon or less likely abscess  Given proximity to skin surface, ultrasound would be the preferred modality for evaluation "  · Will order US of left breast to r/o abscess  · Received dose of vancomycin in the ED - will switch patient to cefepime to cover for both CAP and cellulitis  · Will also add lotrisone topical for possible fungal component              VTE Pharmacologic Prophylaxis:   Pharmacologic: Enoxaparin (Lovenox)  Mechanical VTE Prophylaxis in Place: No    Patient Centered Rounds: I have performed bedside rounds with nursing staff today  Discussions with Specialists or Other Care Team Provider:  Yes    Education and Discussions with Family / Patient:  Yes    Time Spent for Care: 20 minutes    More than 50% of total time spent on counseling and coordination of care as described above  Current Length of Stay: 1 day(s)    Current Patient Status: Inpatient   Certification Statement: The patient will continue to require additional inpatient hospital stay due to Community-acquired pneumonia with left breast abscess    Discharge Plan: To be determined    Code Status: Level 1 - Full Code      Subjective:   Patient complaining of left breast tenderness  He also has some cough  He denies any fevers or chills  He also denies shortness of breath  Objective:     Vitals:   Temp (24hrs), Av 5 °F (38 1 °C), Min:98 2 °F (36 8 °C), Max:102 8 °F (39 3 °C)    Temp:  [98 2 °F (36 8 °C)-102 8 °F (39 3 °C)] 100 8 °F (38 2 °C)  HR:  [] 103  Resp:  [18-22] 18  BP: (121-152)/(62-91) 126/71  SpO2:  [92 %-96 %] 92 %  Body mass index is 41 97 kg/m²  Input and Output Summary (last 24 hours): Intake/Output Summary (Last 24 hours) at 3/21/2019 1300  Last data filed at 3/21/2019 0901  Gross per 24 hour   Intake 100 ml   Output    Net 100 ml       Physical Exam:     Physical Exam  General:  No acute distress, alert oriented x3, morbidly obese body habitus  HEENT:  Normocephalic, atraumatic, PERRLA  Heart:  Regular rate and rhythm, normal S1 and S2 heart sounds  Lungs:  Decreased breath sounds in bilateral lung bases  Skin:  Area of redness and induration as well as tenderness underneath left breast   Extremities:  No lower extremity edema or cyanosis noted      Additional Data:     Labs:    Results from last 7 days   Lab Units 19  0437 19  1850   WBC Thousand/uL 5 36 6 43   HEMOGLOBIN g/dL 12 7 13 8   HEMATOCRIT % 38 5 40 4   PLATELETS Thousands/uL 148* 165   NEUTROS PCT %  --  78*   LYMPHS PCT %  --  13*   MONOS PCT %  --  7   EOS PCT %  --  0     Results from last 7 days   Lab Units 19  0437   SODIUM mmol/L 132*   POTASSIUM mmol/L 3 6   CHLORIDE mmol/L 99*   CO2 mmol/L 20*   BUN mg/dL 8 CREATININE mg/dL 0 87   ANION GAP mmol/L 13   CALCIUM mg/dL 8 1*   ALBUMIN g/dL 2 6*   TOTAL BILIRUBIN mg/dL 0 47   ALK PHOS U/L 52   ALT U/L 53   AST U/L 64*   GLUCOSE RANDOM mg/dL 96     Results from last 7 days   Lab Units 03/20/19  1850   INR  1 18*             Results from last 7 days   Lab Units 03/20/19  1909 03/20/19  1850   LACTIC ACID mmol/L 0 9  --    PROCALCITONIN ng/ml  --  0 17           * I Have Reviewed All Lab Data Listed Above  * Additional Pertinent Lab Tests Reviewed: [facility]    Imaging:    Imaging Reports Reviewed Today Include:  CT chest without contrast, ultrasound left breast  Imaging Personally Reviewed by Myself Includes:  None    Recent Cultures (last 7 days):     Results from last 7 days   Lab Units 03/20/19  2247 03/20/19  1908   INFLUENZA B PCR   --  Not Detected   RSV PCR   --  Not Detected   LEGIONELLA URINARY ANTIGEN  Negative  --        Last 24 Hours Medication List:     Current Facility-Administered Medications:  acetaminophen 650 mg Oral Q6H PRN Diana Rumpf, PA-C    benzonatate 100 mg Oral TID PRN Diana Rumpf, PA-C    cefepime 2,000 mg Intravenous Q12H Salome Blairity, PA-C Last Rate: 2,000 mg (03/21/19 0805)   clotrimazole-betamethasone  Topical BID Glendia Cheng Gyarmaty, PA-C    enoxaparin 40 mg Subcutaneous Daily Leyla Hillman, PA-C    influenza vaccine 0 5 mL Intramuscular Prior to discharge Sunni Mckeon MD    nicotine 1 patch Transdermal Daily Glendia Cheng Gyarmaty, PA-C    ondansetron 4 mg Intravenous Q6H PRN Glendia Cheng Gyarmaty, PA-C    sodium chloride 100 mL/hr Intravenous Continuous Diana Rumpf, PA-C Last Rate: 100 mL/hr (03/21/19 1247)        Today, Patient Was Seen By: Kyleigh Armstrong MD    ** Please Note: Dictation voice to text software may have been used in the creation of this document   **

## 2019-03-21 NOTE — CONSULTS
Consultation - Infectious Disease   Antwan Ghotra 33 y o  male MRN: [id number]  Unit/Bed#: E5 -01 Encounter: [id number]      IMPRESSION & RECOMMENDATIONS:   Impression/Recommendations:  1  Sepsis, POA  Fever, tachycardia  Likely all related to left breast infection  Low clinical suspicion that patient also has bacterial pneumonia despite CT chest that indicated possible right lower lobe pneumonia  Patient with no significant symptoms to suggest bacterial pneumonia  No hypoxia  Procalcitonin level was negative  He continues to have high fevers, likely secondary to persistent abscess     -antibiotic plan as below  -follow up pending blood cultures  -monitor temperatures and hemodynamics  -supportive care    2  Left breast cellulitis with abscess  Now status post bedside I and D  Likely refractory to oral Keflex/Bactrim treatment due to development of undrained abscess  High suspicion for Staph infection, possibly MRSA     -discontinue cefepime  -start IV vancomycin  Consult pharmacy for dosing recommendations  -followup wound culture  -serial exams  -surgery follow-up    3  Abnormal CT chest   CT chest suggested possible right lower lobe pneumonia  Doubt this is the cause of #1 as we have another clear explanation  No overt clinical evidence to suggest bacterial pneumonia  Procalcitonin level was negative  Would not pursue antibiotics for this  We will continue to monitor respiratory symptoms closely  4  Morbid obesity  Likely risk factor for development of severe breast infection  Recommend weight loss, dietary changes  Antibiotics:  Antibiotic 2  Cefepime    Discussed above plan with patient, and with Dr Rebekah Blanton from surgery team   Thank you for this consultation  We will follow along with you      HISTORY OF PRESENT ILLNESS:  Reason for Consult:  Sepsis, left breast abscess    HPI: Lilly Melendez is a 33 y o  male with history of obesity who initially presented to the ER on 3/18 with complain of acute onset left breast pain that was gradually worsening for the past 2 days  Patient was noted to have cellulitis involving the left breast and was discharged on oral Keflex and Bactrim, which the patient states he was compliant with  He then developed sweats, fevers, chills with ongoing of left breast pain, redness prompting him to come back to the ER on   CT chest was concerning for left breast abscess  It also indicated possible right lower lobe pneumonia  Patient admits to a mild cough  No shortness of breath, URI symptoms  Denies any prior history of severe skin infections  He works at [employer]  Patient evaluated by surgery today and found to have a superficial left breast abscess and underwent bedside I and D with drainage of purulent material     REVIEW OF SYSTEMS:  A complete system-based review of systems is otherwise negative  PAST MEDICAL HISTORY:  Past Medical History:   Diagnosis Date    No known problems      Past Surgical History:   Procedure Laterality Date    FRACTURE SURGERY         FAMILY HISTORY:  Non-contributory    SOCIAL HISTORY:  Social History     Substance and Sexual Activity   Alcohol Use Yes    Comment: socially      Social History     Substance and Sexual Activity   Drug Use No     Social History     Tobacco Use   Smoking Status Current Every Day Smoker    Packs/day: 0 50    Types: Cigarettes   Smokeless Tobacco Never Used       ALLERGIES:  No Known Allergies    MEDICATIONS:  All current active medications have been reviewed      PHYSICAL EXAM:  Vitals:  Temp:  [98 2 °F (36 8 °C)-102 8 °F (39 3 °C)] 101 3 °F (38 5 °C)  HR:  [] 101  Resp:  [18-22] 18  BP: (121-152)/(59-91) 134/59  SpO2:  [92 %-96 %] 93 %  Temp (24hrs), Av 6 °F (38 1 °C), Min:98 2 °F (36 8 °C), Max:102 8 °F (39 3 °C)  Current: Temperature: (!) 101 3 °F (38 5 °C)     Physical Exam:  General:  Well-nourished, well-developed, in no acute distress  Eyes:  Conjunctive clear with no hemorrhages or effusions  Oropharynx:  No ulcers, no lesions  Neck:  Supple, no lymphadenopathy  Breasts:  Left inferior breast erythema with fluctuant area with actively draining purulent material  Lungs:  Clear to auscultation bilaterally, no accessory muscle use  Cardiac:  Regular rate and rhythm, no murmurs  Abdomen:  Soft, non-tender, non-distended  Extremities:  No peripheral cyanosis, clubbing, or edema  Skin:  No rashes, no ulcers  Neurological:  Moves all four extremities spontaneously, sensation grossly intact      LABS, IMAGING, & OTHER STUDIES:  Lab Results:  I have personally reviewed pertinent labs  Results from last 7 days   Lab Units 03/21/19  0437 03/20/19  1850 03/18/19  1639   POTASSIUM mmol/L 3 6 3 7 3 9   CHLORIDE mmol/L 99* 98* 101   CO2 mmol/L 20* 22 25   BUN mg/dL 8 9 9   CREATININE mg/dL 0 87 1 03 0 96   EGFR ml/min/1 73sq m 117 98 106   CALCIUM mg/dL 8 1* 8 5 9 1   AST U/L 64* 65* 45   ALT U/L 53 56 74   ALK PHOS U/L 52 61 80     Results from last 7 days   Lab Units 03/21/19  0437 03/20/19  1850 03/18/19  1639   WBC Thousand/uL 5 36 6 43 7 96   HEMOGLOBIN g/dL 12 7 13 8 15 8   PLATELETS Thousands/uL 148* 165 193     Results from last 7 days   Lab Units 03/21/19  0434 03/20/19  2247 03/20/19  1908   GRAM STAIN RESULT  1+ Epithelial cells per low power field*  2+ Polys*  2+ Gram positive rods*  1+ Yeast*  --   --    INFLUENZA B PCR   --   --  Not Detected   RSV PCR   --   --  Not Detected   LEGIONELLA URINARY ANTIGEN   --  Negative  --        Imaging Studies:   I have personally reviewed pertinent imaging study reports and images in PACS  Ultrasound left breast shows organized heterogeneous collection concerning for abscess  CT chest shows right lower lobe pneumonia  3 6 x 1 7 cm lesion of the left breast concerning for abscess  EKG, Pathology, and Other Studies:   I have personally reviewed pertinent reports

## 2019-03-21 NOTE — ASSESSMENT & PLAN NOTE
· Patient reports worsening cough and fevers over the last several days; denies any nasal congestion/sore throat/myalgias  Did not receive a flu vaccine this year  · Met sepsis criteria at time of admission as above  · Received dose of levaquin for CAP    Will switch to cefepime to cover for pneumonia and cellulitis  · Procalcitonin pending  · Urine antigens (strep pneumo/legionella) ordered, sputum culture and gram stain ordered  · Rule out influenza, continue contact precautions

## 2019-03-21 NOTE — ASSESSMENT & PLAN NOTE
· Sepsis likely secondary to community acquired pneumonia and/or cellulitis - patient tachycardic, febrile at time of presentation  · CT chest with contrast: "Right lower lobe pneumonia  3 6 x 1 7 cm lesion of the left breast in close proximity to the dermis  It cannot be determined on the basis of this CT if this represents a dermal lesion, phlegmon or less likely abscess    Given proximity to skin surface, ultrasound would be the preferred modality for evaluation "  · EKG: NSR, normal qtc  · T-max 102 now improved s/p ibuprofen, HR 80's on monitor after 1 L fluid bolus and resolution of fever  · CBC without leukocytosis, CMP with hyponatremia isolated mild elevation in AST  · UA negative   · Lactic acid normal  · Received 1 L bolus in ED - will give another bolus now and continue IVF overnight

## 2019-03-21 NOTE — PLAN OF CARE
Problem: Nutrition/Hydration-ADULT  Goal: Nutrient/Hydration intake appropriate for improving, restoring or maintaining nutritional needs  Description  Monitor and assess patient's nutrition/hydration status for malnutrition (ex- brittle hair, bruises, dry skin, pale skin and conjunctiva, muscle wasting, smooth red tongue, and disorientation)  Collaborate with interdisciplinary team and initiate plan and interventions as ordered  Monitor patient's weight and dietary intake as ordered or per policy  Utilize nutrition screening tool and intervene per policy  Determine patient's food preferences and provide high-protein, high-caloric foods as appropriate       INTERVENTIONS:  - Monitor oral intake, urinary output, labs, and treatment plans  - Assess nutrition and hydration status and recommend course of action  - Evaluate amount of meals eaten  - Assist patient with eating if necessary   - Allow adequate time for meals  - Recommend/ encourage appropriate diets, oral nutritional supplements, and vitamin/mineral supplements  - Order, calculate, and assess calorie counts as needed  - Recommend, monitor, and adjust tube feedings and TPN/PPN based on assessed needs  - Assess need for intravenous fluids  - Provide specific nutrition/hydration education as appropriate  - Include patient/family/caregiver in decisions related to nutrition  Outcome: Progressing     Problem: PAIN - ADULT  Goal: Verbalizes/displays adequate comfort level or baseline comfort level  Description  Interventions:  - Encourage patient to monitor pain and request assistance  - Assess pain using appropriate pain scale  - Administer analgesics based on type and severity of pain and evaluate response  - Implement non-pharmacological measures as appropriate and evaluate response  - Consider cultural and social influences on pain and pain management  - Notify physician/advanced practitioner if interventions unsuccessful or patient reports new pain  Outcome: Progressing     Problem: INFECTION - ADULT  Goal: Absence or prevention of progression during hospitalization  Description  INTERVENTIONS:  - Assess and monitor for signs and symptoms of infection  - Monitor lab/diagnostic results  - Monitor all insertion sites, i e  indwelling lines, tubes, and drains  - Monitor endotracheal (as able) and nasal secretions for changes in amount and color  - Bassett appropriate cooling/warming therapies per order  - Administer medications as ordered  - Instruct and encourage patient and family to use good hand hygiene technique  - Identify and instruct in appropriate isolation precautions for identified infection/condition  Outcome: Progressing  Goal: Absence of fever/infection during neutropenic period  Description  INTERVENTIONS:  - Monitor WBC  - Implement neutropenic guidelines  Outcome: Progressing     Problem: DISCHARGE PLANNING  Goal: Discharge to home or other facility with appropriate resources  Description  INTERVENTIONS:  - Identify barriers to discharge w/patient and caregiver  - Arrange for needed discharge resources and transportation as appropriate  - Identify discharge learning needs (meds, wound care, etc )  - Arrange for interpretive services to assist at discharge as needed  - Refer to Case Management Department for coordinating discharge planning if the patient needs post-hospital services based on physician/advanced practitioner order or complex needs related to functional status, cognitive ability, or social support system  Outcome: Progressing     Problem: Knowledge Deficit  Goal: Patient/family/caregiver demonstrates understanding of disease process, treatment plan, medications, and discharge instructions  Description  Complete learning assessment and assess knowledge base    Interventions:  - Provide teaching at level of understanding  - Provide teaching via preferred learning methods  Outcome: Progressing     Problem: SKIN/TISSUE INTEGRITY - ADULT  Goal: Skin integrity remains intact  Description  INTERVENTIONS  - Identify patients at risk for skin breakdown  - Assess and monitor skin integrity  - Assess and monitor nutrition and hydration status  - Monitor labs (i e  albumin)  - Assess for incontinence   - Turn and reposition patient  - Assist with mobility/ambulation  - Relieve pressure over bony prominences  - Avoid friction and shearing  - Provide appropriate hygiene as needed including keeping skin clean and dry  - Evaluate need for skin moisturizer/barrier cream  - Collaborate with interdisciplinary team (i e  Nutrition, Rehabilitation, etc )   - Patient/family teaching  Outcome: Progressing  Goal: Incision(s), wounds(s) or drain site(s) healing without S/S of infection  Description  INTERVENTIONS  - Assess and document risk factors for skin impairment   - Assess and document dressing, incision, wound bed, drain sites and surrounding tissue  - Initiate Nutrition services consult and/or wound management as needed  Outcome: Progressing  Goal: Oral mucous membranes remain intact  Description  INTERVENTIONS  - Assess oral mucosa and hygiene practices  - Implement preventative oral hygiene regimen  - Implement oral medicated treatments as ordered  - Initiate Nutrition services referral as needed  Outcome: Progressing     Problem: RESPIRATORY - ADULT  Goal: Achieves optimal ventilation and oxygenation  Description  INTERVENTIONS:  - Assess for changes in respiratory status  - Assess for changes in mentation and behavior  - Position to facilitate oxygenation and minimize respiratory effort  - Oxygen administration by appropriate delivery method based on oxygen saturation (per order) or ABGs  - Initiate smoking cessation education as indicated  - Encourage broncho-pulmonary hygiene including cough, deep breathe, Incentive Spirometry  - Assess the need for suctioning and aspirate as needed  - Assess and instruct to report SOB or any respiratory difficulty  - Respiratory Therapy support as indicated  Outcome: Progressing

## 2019-03-21 NOTE — ASSESSMENT & PLAN NOTE
· Patient reports worsening cough and fevers over the last several days; denies any nasal congestion/sore throat/myalgias  Did not receive a flu vaccine this year  · Met sepsis criteria at time of admission as above  · Continue IV cefepime and vancomycin  · Infectious Disease consult placed to assist with antibiotic selection  · Procalcitonin is negative at 0 17   · Urine antigens for strep pneumo and Legionella both negative  Influenza A and B and RSV PCR negative

## 2019-03-21 NOTE — UTILIZATION REVIEW
Initial Clinical Review    Admission: Date/Time/Statement: 3/20/19 @ 2047   Orders Placed This Encounter   Procedures    Inpatient Admission     Standing Status:   Standing     Number of Occurrences:   1     Order Specific Question:   Admitting Physician     Answer:   Jim Greco [[id number]]     Order Specific Question:   Level of Care     Answer:   Med Surg [16]     Order Specific Question:   Estimated length of stay     Answer:   More than 2 Midnights     Order Specific Question:   Certification     Answer:   I certify that inpatient services are medically necessary for this patient for a duration of greater than two midnights  See H&P and MD Progress Notes for additional information about the patient's course of treatment  ED: Date/Time/Mode of Arrival:   ED Arrival Information     Expected Arrival Acuity Means of Arrival Escorted By Service Admission Type    - 3/20/2019 18:24 Emergent [facility]In [facility] Hospitalist Emergency    Arrival Complaint    Flu like Symptoms        Chief Complaint:   Chief Complaint   Patient presents with    Fever - 9 weeks to 74 years     Patient presents complaining of worsening fevers/chills/weakness/dry cough  Was seen Monday for cellulitis of left anterior chest  Took dayquil around 1730 today  Currently taking PO abx  Assessment/Plan:    33  Y O male  Presents to ED from home with  Worsening  L breast  Cellulitis, cough and fever  Was   Treated in  ED  3 days  Prior  To this  For  Cellulitis  L breast,  Sent h ome  With po antibiotics and  Was compliant  Area  Has  Increased tenderness and redness, and  Now has   Fluctuating  Fevers  Denies  Any  Drainage  From area  No leukocytosis  Noted  Ct  Chest  +  RLL  PNA, unable to determine  Etiology  Of  Breast  Lesion  Has  No   PMH  Pt  Did not  Receive  A  Flu shot  This year  Meets  Sepsis  Criteria  With  HR and  siobhan  POA    ED  Treatment  Labs   Cultures,   IVF   BONNIE  Plan for  Admission:  BONNIE, U/S    L breast,  Monitor  Temps   And  VS,  Tele, follow  Cultures  Physical  Exam:   Cellulitic area of the left breast; appears to have started in the breast fold and spreading erythema superiorly  No drainage, no palpable fluctuance  Mild sloughing of superficial skin layer  See media for reference  Cellulitic area outlined with skin marker to evaluate any progression  ED Vital Signs:   ED Triage Vitals [03/20/19 1833]   Temperature Pulse Respirations Blood Pressure SpO2   (!) 102 8 °F (39 3 °C) (!) 111 22 149/74 94 %      Temp Source Heart Rate Source Patient Position - Orthostatic VS BP Location FiO2 (%)   Oral Monitor Sitting Right arm --      Pain Score       6        Wt Readings from Last 1 Encounters:   03/20/19 (!) 137 kg (300 lb 14 9 oz)     Vital Signs (abnormal):    above    Pertinent Labs/Diagnostic Test Results:    WBC  NL  Ct  Chest:   Right lower lobe pneumonia  2   3 6 x 1 7 cm lesion of the left breast in close proximity to the dermis   It cannot be determined on the basis of this CT if this represents a dermal lesion, phlegmon or less likely abscess   Given proximity to skin surface, ultrasound would be the   preferred modality for evaluation  ED Treatment:   Medication Administration from 03/20/2019 1824 to 03/20/2019 2139       Date/Time Order Dose Route Action Action by Comments     03/20/2019 1848 ibuprofen (MOTRIN) tablet 400 mg 400 mg Oral Given Deepthi Rabago RN      03/20/2019 1932 vancomycin (VANCOCIN) 2,000 mg in sodium chloride 0 9 % 500 mL IVPB 2,000 mg Intravenous New Bag Johnita Ganser, RN      03/20/2019 1913 sodium chloride 0 9 % bolus 1,000 mL 1,000 mL Intravenous Salvatore 37 Johnita Ganser, RN      03/20/2019 2013 iohexol (OMNIPAQUE) 350 MG/ML injection (MULTI-DOSE) 85 mL 85 mL Intravenous Given Consuelo Olguin         Past Medical/Surgical History:    Active Ambulatory Problems     Diagnosis Date Noted    No Active Ambulatory Problems     Resolved Ambulatory Problems Diagnosis Date Noted    No Resolved Ambulatory Problems     Past Medical History:   Diagnosis Date    No known problems      Admitting Diagnosis: Fever [R50 9]  Right lower lobe pneumonia ([facility] [address] ) [J18 1]  Sepsis ([facility] [address] ) [A41 9]  Cellulitis of left breast [N61 0]     Age/Sex: 33 y o  male     Admission Orders:   IP   3/20  @  2048  Scheduled Meds:   Current Facility-Administered Medications:  acetaminophen 650 mg Oral Q6H PRN Therman Golds, PA-C    benzonatate 100 mg Oral TID PRN Therman Golds, PA-C    cefepime 2,000 mg Intravenous Q12H Santanas Closs Trinity, PA-C Last Rate: 2,000 mg (03/21/19 0805)   clotrimazole-betamethasone  Topical BID Darkriss Closs Gyleona, PA-C    enoxaparin 40 mg Subcutaneous Daily Darlis Closs Gyleona, PA-C    influenza vaccine 0 5 mL Intramuscular Prior to discharge Kellie Jasmine MD    nicotine 1 patch Transdermal Daily Darkriss Closs Kady, PA-C    ondansetron 4 mg Intravenous Q6H PRN Darlis Closs Gyleona, PA-C    sodium chloride 100 mL/hr Intravenous Continuous Meagan Beck, PA-C Last Rate: 100 mL/hr (03/21/19 0136)     Continuous Infusions:   sodium chloride 100 mL/hr Last Rate: 100 mL/hr (03/21/19 0136)     PRN Meds:   acetaminophen    benzonatate    influenza vaccine    ondansetron       Network Utilization Review Department  Phone: [phone]; Fax [phone]  [email or web address]  ATTENTION: Please call with any questions or concerns to [phone]  and carefully listen to the prompts so that you are directed to the right person  Send all requests for admission clinical reviews, approved or denied determinations and any other requests to fax [phone]   All voicemails are confidential

## 2019-03-21 NOTE — ASSESSMENT & PLAN NOTE
· Was recently evaluated in the ED on Monday - ultrasound at bedside did not show any focal abscess  Discharged home on oral keflex and bactrim which patient reports compliance  Cellulitic area was outlined to monitor progression  · Patient states the redness is slightly worse; painful to the touch  No drainage noted, no induration  · CT chest: "3 6 x 1 7 cm lesion of the left breast in close proximity to the dermis  It cannot be determined on the basis of this CT if this represents a dermal lesion, phlegmon or less likely abscess    Given proximity to skin surface, ultrasound would be the preferred modality for evaluation "  · Will order US of left breast to r/o abscess  · Received dose of vancomycin in the ED - will switch patient to cefepime to cover for both CAP and cellulitis  · Will also add lotrisone topical for possible fungal component

## 2019-03-21 NOTE — ASSESSMENT & PLAN NOTE
· Counseled patient on lifestyle modifications including diet and exercise  · May benefit from outpatient bariatric referral

## 2019-03-21 NOTE — ASSESSMENT & PLAN NOTE
· Was recently evaluated in the ED on Monday - ultrasound at bedside did not show any focal abscess  Discharged home on oral keflex and bactrim which patient reports compliance  Cellulitic area was outlined to monitor progression  · Patient states the redness is slightly worse; painful to the touch  No drainage noted, no induration  · CT chest: "3 6 x 1 7 cm lesion of the left breast in close proximity to the dermis  It cannot be determined on the basis of this CT if this represents a dermal lesion, phlegmon or less likely abscess  Given proximity to skin surface, ultrasound would be the preferred modality for evaluation "  · Ultrasound of left breast shows abscess formation  · Continue IV vancomycin and cefepime  Infectious Disease consult placed  · Blood cultures pending    · Will also add lotrisone topical for possible fungal component

## 2019-03-21 NOTE — ED PROVIDER NOTES
History  Chief Complaint   Patient presents with    Fever - 9 weeks to 74 years     Patient presents complaining of worsening fevers/chills/weakness/dry cough  Was seen Monday for cellulitis of left anterior chest  Took dayquil around 1730 today  Currently taking PO abx       32 y/o male presents to the ED for flu-like symptoms  Patient states that he developed a rash to his left chest wall 6 days ago  He was seen here 2 days ago and was diagnosed with cellulitis  He was discharged home on bactrim and keflex  He states that, since that time, he has had a worsening rash despite taking the antibiotics as prescribed  He also states that he developed fevers, cough, SOB with lying flat, and generalized body aches x 2 days  He states that he took DayQuil prior to arrival   States that he has not gotten the flu shot this year  He denies any chest pain, abdominal pain, nausea/vomiting/diarrhea, or urinary symptoms  No other complaints  History provided by:  Patient      Prior to Admission Medications   Prescriptions Last Dose Informant Patient Reported? Taking? cephalexin (KEFLEX) 500 mg capsule 3/20/2019 at Unknown time  No Yes   Sig: Take 1 capsule (500 mg total) by mouth every 6 (six) hours for 10 days   sulfamethoxazole-trimethoprim (BACTRIM DS) 800-160 mg per tablet 3/20/2019 at Unknown time  No Yes   Sig: Take 1 tablet by mouth 2 (two) times a day for 10 days smx-tmp DS (BACTRIM) 800-160 mg tabs (1tab q12 D10)      Facility-Administered Medications: None       Past Medical History:   Diagnosis Date    No known problems        Past Surgical History:   Procedure Laterality Date    FRACTURE SURGERY         History reviewed  No pertinent family history  I have reviewed and agree with the history as documented  Social History     Tobacco Use    Smoking status: Current Every Day Smoker     Packs/day: 0 50     Types: Cigarettes    Smokeless tobacco: Never Used   Substance Use Topics    Alcohol use:  Yes Comment: socially     Drug use: No        Review of Systems   Constitutional: Positive for fever  Negative for chills  HENT: Negative for congestion, ear pain and sore throat  Eyes: Negative for pain and visual disturbance  Respiratory: Positive for cough  Negative for shortness of breath and wheezing  Cardiovascular: Negative for chest pain and leg swelling  Gastrointestinal: Negative for abdominal pain, diarrhea, nausea and vomiting  Genitourinary: Negative for dysuria, frequency, hematuria and urgency  Musculoskeletal: Positive for myalgias  Negative for neck pain and neck stiffness  Skin: Positive for rash  Negative for wound  Neurological: Negative for weakness, numbness and headaches  Psychiatric/Behavioral: Negative for agitation and confusion  All other systems reviewed and are negative  Physical Exam  ED Triage Vitals [03/20/19 1833]   Temperature Pulse Respirations Blood Pressure SpO2   (!) 102 8 °F (39 3 °C) (!) 111 22 149/74 94 %      Temp Source Heart Rate Source Patient Position - Orthostatic VS BP Location FiO2 (%)   Oral Monitor Sitting Right arm --      Pain Score       6             Orthostatic Vital Signs  Vitals:    03/20/19 1833 03/20/19 2019 03/20/19 2203   BP: 149/74 121/62 123/73   Pulse: (!) 111 89 86   Patient Position - Orthostatic VS: Sitting Lying Lying       Physical Exam   Constitutional: He is oriented to person, place, and time  He appears well-developed and well-nourished  HENT:   Head: Normocephalic and atraumatic  Mouth/Throat: Oropharynx is clear and moist    Eyes: Pupils are equal, round, and reactive to light  EOM are normal    Neck: Normal range of motion  Neck supple  Cardiovascular: Regular rhythm  Tachycardia present  Pulmonary/Chest: Effort normal  No stridor  No respiratory distress  He has no wheezes  He has rhonchi in the right lower field  He has no rales  Abdominal: Soft  Bowel sounds are normal  He exhibits no distension  There is no tenderness  Musculoskeletal: Normal range of motion  Neurological: He is alert and oriented to person, place, and time  No focal deficits   Skin: Skin is warm and dry  Area of erythema and induration to the left anterior chest wall- just under the left breast fold  No fluctuance  No drainage noted  Nursing note and vitals reviewed                ED Medications  Medications   levofloxacin (LEVAQUIN) IVPB (premix) 750 mg (750 mg Intravenous New Bag 3/20/19 2245)   sodium chloride 0 9 % infusion (has no administration in time range)   nicotine (NICODERM CQ) 14 mg/24hr TD 24 hr patch 1 patch (has no administration in time range)   ondansetron (ZOFRAN) injection 4 mg (has no administration in time range)   acetaminophen (TYLENOL) tablet 650 mg (has no administration in time range)   enoxaparin (LOVENOX) subcutaneous injection 40 mg (has no administration in time range)   benzonatate (TESSALON PERLES) capsule 100 mg (has no administration in time range)   sodium chloride 0 9 % bolus 1,000 mL (1,000 mL Intravenous New Bag 3/20/19 2203)   clotrimazole-betamethasone (LOTRISONE) 1-0 05 % cream (has no administration in time range)   cefepime (MAXIPIME) 2,000 mg in dextrose 5 % 50 mL IVPB (has no administration in time range)   ibuprofen (MOTRIN) tablet 400 mg (400 mg Oral Given 3/20/19 1848)   vancomycin (VANCOCIN) 2,000 mg in sodium chloride 0 9 % 500 mL IVPB (0 mg Intravenous Stopped 3/20/19 2201)   sodium chloride 0 9 % bolus 1,000 mL (0 mL Intravenous Stopped 3/20/19 2202)   iohexol (OMNIPAQUE) 350 MG/ML injection (MULTI-DOSE) 85 mL (85 mL Intravenous Given 3/20/19 2013)       Diagnostic Studies  Results Reviewed     Procedure Component Value Units Date/Time    Procalcitonin [[id number]]  (Normal) Collected:  03/20/19 1850    Lab Status:  Final result Specimen:  Blood from Arm, Right Updated:  03/20/19 2224     Procalcitonin 0 17 ng/ml     Urine Microscopic [[id number]]  (Abnormal) Collected:  03/20/19 2021    Lab Status:  Final result Specimen:  Urine, Clean Catch Updated:  03/20/19 2059     RBC, UA 2-4 /hpf      WBC, UA 0-1 /hpf      Epithelial Cells Occasional /hpf      Bacteria, UA Occasional /hpf     POCT urinalysis dipstick [[id number]]  (Abnormal) Resulted:  03/20/19 2018    Lab Status:  Final result Specimen:  Urine Updated:  03/20/19 2023    ED Urine Macroscopic [[id number]]  (Abnormal) Collected:  03/20/19 2021    Lab Status:  Final result Specimen:  Urine Updated:  03/20/19 2018     Color, UA Yellow     Clarity, UA Clear     pH, UA 7 0     Leukocytes, UA Negative     Nitrite, UA Negative     Protein, UA 30 (1+) mg/dl      Glucose, UA Negative mg/dl      Ketones, UA Negative mg/dl      Urobilinogen, UA 1 0 E U /dl      Bilirubin, UA Negative     Blood, UA Small     Specific [place], UA 1 015    Narrative:       CLINITEK RESULT    Protime-INR [[id number]]  (Abnormal) Collected:  03/20/19 1850    Lab Status:  Final result Specimen:  Blood from Arm, Right Updated:  03/20/19 2001     Protime 15 1 seconds      INR 1 18    APTT [[id number]]  (Normal) Collected:  03/20/19 1850    Lab Status:  Final result Specimen:  Blood from Arm, Right Updated:  03/20/19 2001     PTT 36 seconds     Lactic acid x2 [[id number]]  (Normal) Collected:  03/20/19 1909    Lab Status:  Final result Specimen:  Blood from Arm, Left Updated:  03/20/19 1935     LACTIC ACID 0 9 mmol/L     Narrative:       Result may be elevated if tourniquet was used during collection      Comprehensive metabolic panel [[id number]]  (Abnormal) Collected:  03/20/19 1850    Lab Status:  Final result Specimen:  Blood from Arm, Right Updated:  03/20/19 1933     Sodium 131 mmol/L      Potassium 3 7 mmol/L      Chloride 98 mmol/L      CO2 22 mmol/L      ANION GAP 11 mmol/L      BUN 9 mg/dL      Creatinine 1 03 mg/dL      Glucose 94 mg/dL      Calcium 8 5 mg/dL      AST 65 U/L      ALT 56 U/L      Alkaline Phosphatase 61 U/L      Total Protein 7 6 g/dL      Albumin 3 0 g/dL      Total Bilirubin 0 45 mg/dL      eGFR 98 ml/min/1 73sq m     Narrative:       National Kidney Disease Education Program recommendations are as follows:  GFR calculation is accurate only with a steady state creatinine  Chronic Kidney disease less than 60 ml/min/1 73 sq  meters  Kidney failure less than 15 ml/min/1 73 sq  meters  CBC and differential [[id number]]  (Abnormal) Collected:  03/20/19 1850    Lab Status:  Final result Specimen:  Blood from Arm, Right Updated:  03/20/19 1920     WBC 6 43 Thousand/uL      RBC 4 72 Million/uL      Hemoglobin 13 8 g/dL      Hematocrit 40 4 %      MCV 86 fL      MCH 29 2 pg      MCHC 34 2 g/dL      RDW 13 2 %      MPV 10 5 fL      Platelets 724 Thousands/uL      nRBC 0 /100 WBCs      Neutrophils Relative 78 %      Immat GRANS % 2 %      Lymphocytes Relative 13 %      Monocytes Relative 7 %      Eosinophils Relative 0 %      Basophils Relative 0 %      Neutrophils Absolute 5 00 Thousands/µL      Immature Grans Absolute 0 14 Thousand/uL      Lymphocytes Absolute 0 85 Thousands/µL      Monocytes Absolute 0 43 Thousand/µL      Eosinophils Absolute 0 00 Thousand/µL      Basophils Absolute 0 01 Thousands/µL     Blood culture #1 [[id number]] Collected:  03/20/19 1909    Lab Status: In process Specimen:  Blood from Arm, Left Updated:  03/20/19 1915    Influenza A/B and RSV by PCR [[id number]] Collected:  03/20/19 1908    Lab Status: In process Specimen:  Nasopharyngeal Swab Updated:  03/20/19 1915    Blood culture #2 [[id number]] Collected:  03/20/19 1850    Lab Status: In process Specimen:  Blood from Arm, Right Updated:  03/20/19 1915                 CT chest with contrast   Final Result by Hetal Shine MD (03/20 2020)   1  Right lower lobe pneumonia  2   3 6 x 1 7 cm lesion of the left breast in close proximity to the dermis  It cannot be determined on the basis of this CT if this represents a dermal lesion, phlegmon or less likely abscess    Given proximity to skin surface, ultrasound would be the    preferred modality for evaluation  Workstation performed: [id number]         US breast left limited (diagnostic)    (Results Pending)         Procedures  Procedures      Phone Consults  ED Phone Contact    ED Course                   Initial Sepsis Screening     [address] Name 03/20/19 1900                Is the patient's history suggestive of a new or worsening infection? Yes (Proceed)  (Abnormal)   -SP        Suspected source of infection  soft tissue  -SP        Are two or more of the following signs & symptoms of infection both present and new to the patient? Yes (Proceed)  (Abnormal)   -SP        Indicate SIRS criteria  Hyperthemia > 38 3C (100 9F); Tachycardia > 90 bpm  -SP        If the answer is yes to both questions, suspicion of sepsis is present          If severe sepsis is present AND tissue hypoperfusion perists in the hour after fluid resuscitation or lactate > 4, the patient meets criteria for SEPTIC SHOCK          Are any of the following organ dysfunction criteria present within 6 hours of suspected infection and SIRS criteria that are NOT considered to be chronic conditions? No  -SP        Organ dysfunction          Date of presentation of severe sepsis          Time of presentation of severe sepsis          Tissue hypoperfusion persists in the hour after crystalloid fluid administration, evidenced, by either:          Was hypotension present within one hour of the conclusion of crystalloid fluid administration?           Date of presentation of septic shock          Time of presentation of septic shock            User Key  (r) = Recorded By, (t) = Taken By, (c) = Cosigned By    [address] Name Provider Type    HOLLY [facility], DO Physician           Default Flowsheet Data (last 720 hours)      Sepsis Reassess     Row Name 03/20/19 2030                   Repeat Volume Status and Tissue Perfusion Assessment Performed    Repeat Volume Status and Tissue Perfusion Assessment Performed  Yes  -SP           Volume Status and Tissue Perfusion Post Fluid Resuscitation * Must Document All *    Vital Signs Reviewed (HR, RR, BP, T)  Yes  -SP        Shock Index Reviewed  Yes  -SP        Arterial Oxygen Saturation Reviewed (POx, SaO2 or SpO2)  Yes (comment %)  -SP        Cardio  Normal S1/S2; Regular rate and rhythm  -SP        Pulmonary  Normal effort;Rhonchi  (Abnormal)   -SP        Capillary Refill  Brisk  -SP        Peripheral Pulses  Radial;Dorsalis Pedis  -SP        Peripheral Pulse  +2  -SP        Dorsalis Pedis  +2  -SP        Skin  Warm;Dry  -SP        Urine output assessed  Adequate  -SP           *OR*   Intensive Monitoring- Must Document One of the Following Four *:    Vital Signs Reviewed          * Central Venous Pressure (CVP or RAP)          * Central Venous Oxygen (SVO2, ScvO2 or Oxygen saturation via central catheter)          * Bedside Cardiovascular US in IVC diameter and % collapse          * Passive Leg Raise OR Crystalloid Challenge            User Key  (r) = Recorded By, (t) = Taken By, (c) = Cosigned By    Initials Name Provider Type    SP Nisha Davenport DO Physician                MDM  Number of Diagnoses or Management Options  Cellulitis of left breast: new and requires workup  Right lower lobe pneumonia [facility]): new and requires workup  Sepsis [facility]): new and requires workup  Diagnosis management comments: Patient with sepsis and cellulitis vs abscess  Will get septic workup, CT Chest to r o pneumonia/ deep abscess  Will give fluids, motrin, and antibiotics  Will require admission  Patient reevaluated and feels improved  Patient updated on results of tests and plan of care including admission to hospital for further evaluation of presenting complaint  Patient demonstrates verbal understanding and agrees with plan  Report to [facility]  with admission under Dr Monika Moon for continuation of patient care          Amount and/or Complexity of Data Reviewed  Clinical lab tests: ordered and reviewed  Tests in the radiology section of CPT®: ordered and reviewed  Tests in the medicine section of CPT®: ordered and reviewed  Discussion of test results with the performing providers: yes  Decide to obtain previous medical records or to obtain history from someone other than the patient: yes  Obtain history from someone other than the patient: yes  Review and summarize past medical records: yes  Discuss the patient with other providers: yes  Independent visualization of images, tracings, or specimens: yes    Patient Progress  Patient progress: improved      Disposition  Final diagnoses:   Right lower lobe pneumonia ([facility] [address] )   [facility]   Cellulitis of left breast     Time reflects when diagnosis was documented in both MDM as applicable and the Disposition within this note     Time User Action Codes Description Comment    3/20/2019  8:51 PM Twyla Le Add [J18 1] Right lower lobe pneumonia ([facility] )     3/20/2019  8:51 PM Twyla Le Add [A41 9] Sepsis ([facility] )     3/20/2019  8:51 PM Johan DUENAS Add [N61 0] Cellulitis of left breast       ED Disposition     ED Disposition Condition Date/Time Comment    Admit Stable Wed Mar 20, 2019  8:47 PM Case was discussed with AUDIE and the patient's admission status was agreed to be Admission Status: inpatient status to the service of Dr Prashanth Altamirano           Follow-up Information    None         Current Discharge Medication List      CONTINUE these medications which have NOT CHANGED    Details   cephalexin (KEFLEX) 500 mg capsule Take 1 capsule (500 mg total) by mouth every 6 (six) hours for 10 days  Qty: 40 capsule, Refills: 0    Associated Diagnoses: Cellulitis of chest wall      sulfamethoxazole-trimethoprim (BACTRIM DS) 800-160 mg per tablet Take 1 tablet by mouth 2 (two) times a day for 10 days smx-tmp DS (BACTRIM) 800-160 mg tabs (1tab q12 D10)  Qty: 20 tablet, Refills: 0    Associated Diagnoses: Cellulitis of chest wall No discharge procedures on file  ED Provider  Attending physically available and evaluated Antwan Parish  I managed the patient along with the ED Attending      Electronically Signed by         Tha Reeves,   03/20/19 8247 Jose Alberto Hewitt DO  03/20/19 7828

## 2019-03-21 NOTE — ED ATTENDING ATTESTATION
Samantha Greenwood DO, saw and evaluated the patient  I have discussed the patient with the resident/non-physician practitioner and agree with the resident's/non-physician practitioner's findings, Plan of Care, and MDM as documented in the resident's/non-physician practitioner's note, except where noted  All available labs and Radiology studies were reviewed  I was present for key portions of any procedure(s) performed by the resident/non-physician practitioner and I was immediately available to provide assistance  At this point I agree with the current assessment done in the Emergency Department  I have conducted an independent evaluation of this patient a history and physical is as follows:    33year old male p/w f/c cough left chest wall pain  Seen recently for left chest cellulitis  Placed on bactrim keflex  Reports now chills/fever cough worsening pain  tmax 102  No a/e factors  No other c/o  Febrile here, tachycardic  Nad  Exam reveals induration but non flucuant mass of left breast  Lungs rhoncoros  A/P: sepsis  Abscess unlikely on exam, ct chest r/o invading abscess    Septic workup    Critical Care Time  Procedures

## 2019-03-21 NOTE — H&P
H&P- Antwan Goldsteint 1990, 33 y o  male MRN: [id number]    Unit/Bed#: E5 -01 Encounter: [id number]    Primary Care Provider: Foreign Jain DO   Date and time admitted to hospital: 3/20/2019  6:33 PM    * Sepsis [facility])  Assessment & Plan  · Sepsis likely secondary to community acquired pneumonia and/or cellulitis - patient tachycardic, febrile at time of presentation  · CT chest with contrast: "Right lower lobe pneumonia  3 6 x 1 7 cm lesion of the left breast in close proximity to the dermis  It cannot be determined on the basis of this CT if this represents a dermal lesion, phlegmon or less likely abscess  Given proximity to skin surface, ultrasound would be the preferred modality for evaluation "  · EKG: NSR, normal qtc  · T-max 102 now improved s/p ibuprofen, HR 80's on monitor after 1 L fluid bolus and resolution of fever  · CBC without leukocytosis, CMP with hyponatremia isolated mild elevation in AST  · UA negative   · Lactic acid normal  · Received 1 L bolus in ED - will give another bolus now and continue IVF overnight    Community acquired pneumonia  Assessment & Plan  · Patient reports worsening cough and fevers over the last several days; denies any nasal congestion/sore throat/myalgias  Did not receive a flu vaccine this year  · Met sepsis criteria at time of admission as above  · Received dose of levaquin for CAP  Will switch to cefepime to cover for pneumonia and cellulitis  · Procalcitonin pending  · Urine antigens (strep pneumo/legionella) ordered, sputum culture and gram stain ordered  · Rule out influenza, continue contact precautions    Cellulitis  Assessment & Plan  · Was recently evaluated in the ED on Monday - ultrasound at bedside did not show any focal abscess  Discharged home on oral keflex and bactrim which patient reports compliance  Cellulitic area was outlined to monitor progression  · Patient states the redness is slightly worse; painful to the touch    No drainage noted, no induration  · CT chest: "3 6 x 1 7 cm lesion of the left breast in close proximity to the dermis  It cannot be determined on the basis of this CT if this represents a dermal lesion, phlegmon or less likely abscess  Given proximity to skin surface, ultrasound would be the preferred modality for evaluation "  · Will order US of left breast to r/o abscess  · Received dose of vancomycin in the ED - will switch patient to cefepime to cover for both CAP and cellulitis  · Will also add lotrisone topical for possible fungal component          Morbid obesity with BMI of 40 0-44 9, adult ([facility] [address] )  Assessment & Plan  · Counseled patient on lifestyle modifications including diet and exercise  · May benefit from outpatient bariatric referral    VTE Prophylaxis: Enoxaparin (Lovenox)  / sequential compression device   Code Status: Level 1 - Full Code  POLST: There is no POLST form on file for this patient (pre-hospital)  Discussion with family: Discussed with patient and family at bedside  Anticipated Length of Stay:  Patient will be admitted on an Inpatient basis with an anticipated length of stay of  Greater than 2 midnights  Justification for Hospital Stay: sepsis 2/2 PNA and/or cellulitis    Total Time for Visit, including Counseling / Coordination of Care: 45 minutes  Greater than 50% of this total time spent on direct patient counseling and coordination of care  Chief Complaint:   Worsening left breast cellulitis, cough, fever x 4 days    History of Present Illness:    Mack Acharya is a 33 y o  male who presents with worsening left breast cellulitis, cough and fever  Patient without prior significant medical history  Patient states that he was evaluated in the emergency department this past Monday diagnosed with cellulitis of the breast   At the time patient was given a prescription for both Keflex and Bactrim; reports compliance    States that the area has become more red and tender; also has been having fluctuating subjective fevers at home  Reports he has been having a dry cough for the last several days, denies nasal congestion or body aches  Did not receive his flu vaccine this year, denies any recent sick contacts  Denies any chest pain/palpitations, shortness of breath, nausea/vomiting or diarrhea  Denies any known drainage from the site of cellulitis, denies any knowledge of wound prior to onset  Review of Systems:    Review of Systems   Constitutional: Positive for chills and fever  Negative for fatigue and unexpected weight change  HENT: Negative for congestion, sore throat and trouble swallowing  Eyes: Negative for photophobia, pain and visual disturbance  Respiratory: Positive for cough  Negative for shortness of breath and wheezing  Cardiovascular: Negative for chest pain, palpitations and leg swelling  Gastrointestinal: Negative for abdominal pain, constipation, diarrhea, nausea and vomiting  Endocrine: Negative for polyuria  Genitourinary: Negative for difficulty urinating, dysuria, flank pain, hematuria and urgency  Musculoskeletal: Negative for back pain, myalgias, neck pain and neck stiffness  Skin: Positive for color change and rash  Negative for pallor  Neurological: Negative for dizziness, tremors, syncope, speech difficulty, weakness, light-headedness and headaches  Hematological: Does not bruise/bleed easily  Psychiatric/Behavioral: Negative for agitation and confusion  Past Medical and Surgical History:     Past Medical History:   Diagnosis Date    No known problems        Past Surgical History:   Procedure Laterality Date    FRACTURE SURGERY         Meds/Allergies:    Prior to Admission medications    Medication Sig Start Date End Date Taking?  Authorizing Provider   cephalexin (KEFLEX) 500 mg capsule Take 1 capsule (500 mg total) by mouth every 6 (six) hours for 10 days 3/18/19 3/28/19 Yes Della Nagy MD   sulfamethoxazole-trimethoprim (BACTRIM DS) 800-160 mg per tablet Take 1 tablet by mouth 2 (two) times a day for 10 days smx-tmp DS (BACTRIM) 800-160 mg tabs (1tab q12 D10) 3/18/19 3/28/19 Yes Peggy Dunham MD     I have reviewed home medications with patient personally  Allergies: No Known Allergies    Social History:     Marital Status: Single   Occupation: Unknown  Patient Pre-hospital Living Situation: Lives with others  Patient Pre-hospital Level of Mobility: Independent  Patient Pre-hospital Diet Restrictions: None  Substance Use History:   Social History     Substance and Sexual Activity   Alcohol Use Yes    Comment: socially      Social History     Tobacco Use   Smoking Status Current Every Day Smoker    Packs/day: 0 50    Types: Cigarettes   Smokeless Tobacco Never Used     Social History     Substance and Sexual Activity   Drug Use No       Family History:    History reviewed  No pertinent family history  Physical Exam:     Vitals:   Blood Pressure: 123/73 (03/20/19 2203)  Pulse: 86 (03/20/19 2203)  Temperature: 98 2 °F (36 8 °C) (03/20/19 2203)  Temp Source: Temporal (03/20/19 2203)  Respirations: 18 (03/20/19 2203)  Height: 5' 11" (180 3 cm) (03/20/19 1911)  Weight - Scale: (!) 137 kg (300 lb 14 9 oz) (03/20/19 1833)  SpO2: 94 % (03/20/19 2203)    Physical Exam   Constitutional: He is oriented to person, place, and time  Vital signs are normal  He appears well-developed  Morbidly obese  Appears in no acute distress  HENT:   Head: Normocephalic  Eyes: Pupils are equal, round, and reactive to light  Conjunctivae and EOM are normal  No scleral icterus  Neck: Normal range of motion  Cardiovascular: Normal rate, regular rhythm and normal heart sounds  No murmur heard  Pulmonary/Chest: Effort normal  No respiratory distress  He has rhonchi in the right middle field and the right lower field  Abdominal: Soft  Bowel sounds are normal  There is no tenderness     Morbidly obese   Musculoskeletal: He exhibits no edema, tenderness or deformity  Neurological: He is alert and oriented to person, place, and time  Skin: Skin is warm and dry  Cellulitic area of the left breast; appears to have started in the breast fold and spreading erythema superiorly  No drainage, no palpable fluctuance  Mild sloughing of superficial skin layer  See media for reference  Cellulitic area outlined with skin marker to evaluate any progression  Psychiatric: He has a normal mood and affect  His speech is normal and behavior is normal  Cognition and memory are normal    Nursing note and vitals reviewed  Additional Data:     Lab Results: I have personally reviewed pertinent reports  Results from last 7 days   Lab Units 03/20/19  1850   WBC Thousand/uL 6 43   HEMOGLOBIN g/dL 13 8   HEMATOCRIT % 40 4   PLATELETS Thousands/uL 165   NEUTROS PCT % 78*   LYMPHS PCT % 13*   MONOS PCT % 7   EOS PCT % 0     Results from last 7 days   Lab Units 03/20/19  1850   SODIUM mmol/L 131*   POTASSIUM mmol/L 3 7   CHLORIDE mmol/L 98*   CO2 mmol/L 22   BUN mg/dL 9   CREATININE mg/dL 1 03   ANION GAP mmol/L 11   CALCIUM mg/dL 8 5   ALBUMIN g/dL 3 0*   TOTAL BILIRUBIN mg/dL 0 45   ALK PHOS U/L 61   ALT U/L 56   AST U/L 65*   GLUCOSE RANDOM mg/dL 94     Results from last 7 days   Lab Units 03/20/19  1850   INR  1 18*             Results from last 7 days   Lab Units 03/20/19  1909 03/20/19  1850   LACTIC ACID mmol/L 0 9  --    PROCALCITONIN ng/ml  --  0 17       Imaging: I have personally reviewed pertinent reports  CT chest with contrast   Final Result by Joellen Howell MD (03/20 2020)   1  Right lower lobe pneumonia  2   3 6 x 1 7 cm lesion of the left breast in close proximity to the dermis  It cannot be determined on the basis of this CT if this represents a dermal lesion, phlegmon or less likely abscess  Given proximity to skin surface, ultrasound would be the    preferred modality for evaluation        Workstation performed: [id number] US breast left limited (diagnostic)    (Results Pending)       EKG, Pathology, and Other Studies Reviewed on Admission:   · EKG: NSR normal qtc    Allscripts / Epic Records Reviewed: Yes     ** Please Note: This note has been constructed using a voice recognition system   **

## 2019-03-21 NOTE — PROCEDURES
Incision and drain  Date/Time: 3/21/2019 2:53 PM  Performed by: Ezekiel Champion MD  Authorized by: Ezekiel Champion MD     Patient location:  Bedside  Other Assisting Provider: No    Consent:     Consent obtained:  Written    Consent given by:  Patient    Risks discussed:  Bleeding, incomplete drainage and pain    Alternatives discussed:  No treatment  Universal protocol:     Procedure explained and questions answered to patient or proxy's satisfaction: yes      Site/side marked: yes      Immediately prior to procedure a time out was called: yes      Patient identity confirmed:  Verbally with patient  Location:     Type:  Abscess    Size:  2 cm    Location:  Trunk    Trunk location:  L breast  Pre-procedure details:     Skin preparation:  Betadine  Anesthesia (see MAR for exact dosages): Anesthesia method:  Local infiltration    Local anesthetic:  Lidocaine 1% WITH epi  Procedure details:     Complexity:  Simple    Needle aspiration: no      Incision types:  Stab incision    Scalpel blade:  15    Approach:  Open    Incision depth:  Subcutaneous    Wound management:  Probed and deloculated    Drainage:  Purulent    Drainage amount: Moderate    Wound treatment:  Packing placed    Packing material: 4 x 4 gauze  Post-procedure details:     Patient tolerance of procedure:   Tolerated well, no immediate complications

## 2019-03-21 NOTE — CONSULTS
Consultation - General Surgery   Antwan Waterman 33 y o  male MRN: [id number]  Unit/Bed#: E5 -01 Encounter: [id number]    Assessment/Plan     Assessment:  The patient is a pleasant 76-year-old male presenting with signs and symptoms of a an uncomplicated infra area older left breast soft tissue abscess for which incision and drainage is now indicated  Plan:  Technical details of abscess incision and drainage was explained to the patient as were the risks related to local anesthesia, bleeding and infection as well as the need for additional surgical interventions  All questions answered to the patient's satisfaction and informed consent obtained to proceed  History of Present Illness     HPI:  Nicolasa Rodriguez is a 33 y o  male who presents with a left breast abscess for which incision and drainage is now indicated       Inpatient consult to Acute Care Surgery  Consult performed by: Smita Benz MD  Consult ordered by: Catarina Werner MD          Review of Systems   Constitutional: Positive for appetite change, fatigue and fever  HENT: Negative  Eyes: Negative  Respiratory: Positive for cough and shortness of breath  Cardiovascular: Negative  Gastrointestinal: Negative  Endocrine: Negative  Genitourinary: Negative  Musculoskeletal: Positive for myalgias  Skin:        Left breast tenderness developed over 3-4 days prior to admission   Allergic/Immunologic: Negative  Neurological: Negative  Hematological: Negative  Psychiatric/Behavioral: Negative  All other systems reviewed and are negative        Historical Information   Past Medical History:   Diagnosis Date    No known problems      Past Surgical History:   Procedure Laterality Date    FRACTURE SURGERY       Social History   Social History     Substance and Sexual Activity   Alcohol Use Yes    Comment: socially      Social History     Substance and Sexual Activity   Drug Use No     Social History     Tobacco Use   Smoking Status Current Every Day Smoker    Packs/day: 0 50    Types: Cigarettes   Smokeless Tobacco Never Used     Family History: non-contributory    Meds/Allergies   all current active meds have been reviewed  No Known Allergies    Objective   First Vitals:   Blood Pressure: 149/74 (03/20/19 1833)  Pulse: (!) 111 (03/20/19 1833)  Temperature: (!) 102 8 °F (39 3 °C) (03/20/19 1833)  Temp Source: Oral (03/20/19 1833)  Respirations: 22 (03/20/19 1833)  Height: 5' 11" (180 3 cm) (03/20/19 1911)  Weight - Scale: (!) 137 kg (300 lb 14 9 oz) (03/20/19 1833)  SpO2: 94 % (03/20/19 1833)    Current Vitals:   Blood Pressure: 126/71 (03/21/19 0757)  Pulse: 103 (03/21/19 0757)  Temperature: (!) 100 8 °F (38 2 °C) (03/21/19 0933)  Temp Source: Oral (03/21/19 0933)  Respirations: 18 (03/21/19 0757)  Height: 5' 10 98" (180 3 cm) (03/21/19 1354)  Weight - Scale: (!) 137 kg (300 lb 14 9 oz) (03/20/19 1833)  SpO2: 92 % (03/21/19 0757)      Intake/Output Summary (Last 24 hours) at 3/21/2019 1431  Last data filed at 3/21/2019 0901  Gross per 24 hour   Intake 100 ml   Output    Net 100 ml       Invasive Devices     Peripheral Intravenous Line            Peripheral IV 03/20/19 Right Antecubital less than 1 day                Physical Exam   Constitutional: He is oriented to person, place, and time  He appears well-developed and well-nourished  HENT:   Head: Normocephalic and atraumatic  Eyes: Pupils are equal, round, and reactive to light  Conjunctivae and EOM are normal    Neck: Normal range of motion  Neck supple  Cardiovascular: Normal rate, regular rhythm and normal heart sounds  Pulmonary/Chest: Effort normal    Abdominal: Soft  Bowel sounds are normal    Musculoskeletal: Normal range of motion  Neurological: He is alert and oriented to person, place, and time     Skin:   Patient has an obvious draining abscess inferior to the nipple-areolar complex 2 cm in diameter on the left breast   There is a surrounding area of erythema 5 cm in greatest dimension   Psychiatric: His behavior is normal  Judgment and thought content normal    Vitals reviewed  Lab Results: I have personally reviewed pertinent lab results  Imaging: I have personally reviewed pertinent reports  EKG, Pathology, and Other Studies: I have personally reviewed pertinent reports  Counseling / Coordination of Care  Total floor / unit time spent today 35 minutes  Greater than 50% of total time was spent with the patient and / or family counseling and / or coordination of care  A description of the counseling / coordination of care: 15

## 2019-03-21 NOTE — PLAN OF CARE
Problem: Nutrition/Hydration-ADULT  Goal: Nutrient/Hydration intake appropriate for improving, restoring or maintaining nutritional needs  Description  Monitor and assess patient's nutrition/hydration status for malnutrition (ex- brittle hair, bruises, dry skin, pale skin and conjunctiva, muscle wasting, smooth red tongue, and disorientation)  Collaborate with interdisciplinary team and initiate plan and interventions as ordered  Monitor patient's weight and dietary intake as ordered or per policy  Utilize nutrition screening tool and intervene per policy  Determine patient's food preferences and provide high-protein, high-caloric foods as appropriate       INTERVENTIONS:  - Monitor oral intake, urinary output, labs, and treatment plans  - Assess nutrition and hydration status and recommend course of action  - Evaluate amount of meals eaten  - Assist patient with eating if necessary   - Allow adequate time for meals  - Recommend/ encourage appropriate diets, oral nutritional supplements, and vitamin/mineral supplements  - Order, calculate, and assess calorie counts as needed  - Recommend, monitor, and adjust tube feedings and TPN/PPN based on assessed needs  - Assess need for intravenous fluids  - Provide specific nutrition/hydration education as appropriate  - Include patient/family/caregiver in decisions related to nutrition  Outcome: Progressing     Problem: PAIN - ADULT  Goal: Verbalizes/displays adequate comfort level or baseline comfort level  Description  Interventions:  - Encourage patient to monitor pain and request assistance  - Assess pain using appropriate pain scale  - Administer analgesics based on type and severity of pain and evaluate response  - Implement non-pharmacological measures as appropriate and evaluate response  - Consider cultural and social influences on pain and pain management  - Notify physician/advanced practitioner if interventions unsuccessful or patient reports new pain  Outcome: Progressing     Problem: INFECTION - ADULT  Goal: Absence or prevention of progression during hospitalization  Description  INTERVENTIONS:  - Assess and monitor for signs and symptoms of infection  - Monitor lab/diagnostic results  - Monitor all insertion sites, i e  indwelling lines, tubes, and drains  - Monitor endotracheal (as able) and nasal secretions for changes in amount and color  - [place] appropriate cooling/warming therapies per order  - Administer medications as ordered  - Instruct and encourage patient and family to use good hand hygiene technique  - Identify and instruct in appropriate isolation precautions for identified infection/condition  Outcome: Progressing  Goal: Absence of fever/infection during neutropenic period  Description  INTERVENTIONS:  - Monitor WBC  - Implement neutropenic guidelines  Outcome: Progressing     Problem: DISCHARGE PLANNING  Goal: Discharge to home or other facility with appropriate resources  Description  INTERVENTIONS:  - Identify barriers to discharge w/patient and caregiver  - Arrange for needed discharge resources and transportation as appropriate  - Identify discharge learning needs (meds, wound care, etc )  - Arrange for interpretive services to assist at discharge as needed  - Refer to Case Management Department for coordinating discharge planning if the patient needs post-hospital services based on physician/advanced practitioner order or complex needs related to functional status, cognitive ability, or social support system  Outcome: Progressing     Problem: Knowledge Deficit  Goal: Patient/family/caregiver demonstrates understanding of disease process, treatment plan, medications, and discharge instructions  Description  Complete learning assessment and assess knowledge base    Interventions:  - Provide teaching at level of understanding  - Provide teaching via preferred learning methods  Outcome: Progressing     Problem: SKIN/TISSUE INTEGRITY - ADULT  Goal: Skin integrity remains intact  Description  INTERVENTIONS  - Identify patients at risk for skin breakdown  - Assess and monitor skin integrity  - Assess and monitor nutrition and hydration status  - Monitor labs (i e  albumin)  - Assess for incontinence   - Turn and reposition patient  - Assist with mobility/ambulation  - Relieve pressure over bony prominences  - Avoid friction and shearing  - Provide appropriate hygiene as needed including keeping skin clean and dry  - Evaluate need for skin moisturizer/barrier cream  - Collaborate with interdisciplinary team (i e  Nutrition, Rehabilitation, etc )   - Patient/family teaching  Outcome: Progressing  Goal: Incision(s), wounds(s) or drain site(s) healing without S/S of infection  Description  INTERVENTIONS  - Assess and document risk factors for skin impairment   - Assess and document dressing, incision, wound bed, drain sites and surrounding tissue  - Initiate Nutrition services consult and/or wound management as needed  Outcome: Progressing  Goal: Oral mucous membranes remain intact  Description  INTERVENTIONS  - Assess oral mucosa and hygiene practices  - Implement preventative oral hygiene regimen  - Implement oral medicated treatments as ordered  - Initiate Nutrition services referral as needed  Outcome: Progressing     Problem: RESPIRATORY - ADULT  Goal: Achieves optimal ventilation and oxygenation  Description  INTERVENTIONS:  - Assess for changes in respiratory status  - Assess for changes in mentation and behavior  - Position to facilitate oxygenation and minimize respiratory effort  - Oxygen administration by appropriate delivery method based on oxygen saturation (per order) or ABGs  - Initiate smoking cessation education as indicated  - Encourage broncho-pulmonary hygiene including cough, deep breathe, Incentive Spirometry  - Assess the need for suctioning and aspirate as needed  - Assess and instruct to report SOB or any respiratory difficulty  - Respiratory Therapy support as indicated  Outcome: Progressing

## 2019-03-22 LAB
ANION GAP SERPL CALCULATED.3IONS-SCNC: 12 MMOL/L (ref 4–13)
BASOPHILS # BLD MANUAL: 0.05 THOUSAND/UL (ref 0–0.1)
BASOPHILS NFR MAR MANUAL: 1 % (ref 0–1)
BUN SERPL-MCNC: 8 MG/DL (ref 5–25)
C DIFF TOX GENS STL QL NAA+PROBE: NORMAL
CALCIUM SERPL-MCNC: 8.1 MG/DL (ref 8.3–10.1)
CHLORIDE SERPL-SCNC: 99 MMOL/L (ref 100–108)
CO2 SERPL-SCNC: 23 MMOL/L (ref 21–32)
CREAT SERPL-MCNC: 0.76 MG/DL (ref 0.6–1.3)
EOSINOPHIL # BLD MANUAL: 0 THOUSAND/UL (ref 0–0.4)
EOSINOPHIL NFR BLD MANUAL: 0 % (ref 0–6)
ERYTHROCYTE [DISTWIDTH] IN BLOOD BY AUTOMATED COUNT: 13.4 % (ref 11.6–15.1)
GFR SERPL CREATININE-BSD FRML MDRD: 123 ML/MIN/1.73SQ M
GLUCOSE SERPL-MCNC: 92 MG/DL (ref 65–140)
HCT VFR BLD AUTO: 39.5 % (ref 36.5–49.3)
HGB BLD-MCNC: 13.2 G/DL (ref 12–17)
LG PLATELETS BLD QL SMEAR: PRESENT
LYMPHOCYTES # BLD AUTO: 1.08 THOUSAND/UL (ref 0.6–4.47)
LYMPHOCYTES # BLD AUTO: 23 % (ref 14–44)
MCH RBC QN AUTO: 28.8 PG (ref 26.8–34.3)
MCHC RBC AUTO-ENTMCNC: 33.4 G/DL (ref 31.4–37.4)
MCV RBC AUTO: 86 FL (ref 82–98)
MONOCYTES # BLD AUTO: 0.05 THOUSAND/UL (ref 0–1.22)
MONOCYTES NFR BLD: 1 % (ref 4–12)
NEUTROPHILS # BLD MANUAL: 3.01 THOUSAND/UL (ref 1.85–7.62)
NEUTS BAND NFR BLD MANUAL: 6 % (ref 0–8)
NEUTS SEG NFR BLD AUTO: 58 % (ref 43–75)
NRBC BLD AUTO-RTO: 0 /100 WBCS
NRBC BLD AUTO-RTO: 1 /100 WBC (ref 0–2)
PLATELET # BLD AUTO: 170 THOUSANDS/UL (ref 149–390)
PLATELET BLD QL SMEAR: ADEQUATE
PMV BLD AUTO: 10.6 FL (ref 8.9–12.7)
POTASSIUM SERPL-SCNC: 3.4 MMOL/L (ref 3.5–5.3)
PROCALCITONIN SERPL-MCNC: 0.39 NG/ML
RBC # BLD AUTO: 4.58 MILLION/UL (ref 3.88–5.62)
RBC MORPH BLD: NORMAL
SODIUM SERPL-SCNC: 134 MMOL/L (ref 136–145)
TOTAL CELLS COUNTED SPEC: 100
VANCOMYCIN TROUGH SERPL-MCNC: 5.5 UG/ML (ref 10–20)
VARIANT LYMPHS # BLD AUTO: 11 %
WBC # BLD AUTO: 4.71 THOUSAND/UL (ref 4.31–10.16)

## 2019-03-22 PROCEDURE — 99232 SBSQ HOSP IP/OBS MODERATE 35: CPT | Performed by: FAMILY MEDICINE

## 2019-03-22 PROCEDURE — 85027 COMPLETE CBC AUTOMATED: CPT | Performed by: FAMILY MEDICINE

## 2019-03-22 PROCEDURE — 99232 SBSQ HOSP IP/OBS MODERATE 35: CPT | Performed by: INTERNAL MEDICINE

## 2019-03-22 PROCEDURE — 85007 BL SMEAR W/DIFF WBC COUNT: CPT | Performed by: FAMILY MEDICINE

## 2019-03-22 PROCEDURE — 80202 ASSAY OF VANCOMYCIN: CPT | Performed by: INTERNAL MEDICINE

## 2019-03-22 PROCEDURE — 80048 BASIC METABOLIC PNL TOTAL CA: CPT | Performed by: FAMILY MEDICINE

## 2019-03-22 PROCEDURE — 84145 PROCALCITONIN (PCT): CPT | Performed by: FAMILY MEDICINE

## 2019-03-22 PROCEDURE — 99024 POSTOP FOLLOW-UP VISIT: CPT | Performed by: SURGERY

## 2019-03-22 RX ORDER — POTASSIUM CHLORIDE 20 MEQ/1
40 TABLET, EXTENDED RELEASE ORAL ONCE
Status: COMPLETED | OUTPATIENT
Start: 2019-03-22 | End: 2019-03-22

## 2019-03-22 RX ORDER — DIPHENHYDRAMINE HCL 25 MG
25 TABLET ORAL ONCE
Status: COMPLETED | OUTPATIENT
Start: 2019-03-22 | End: 2019-03-22

## 2019-03-22 RX ADMIN — CLOTRIMAZOLE AND BETAMETHASONE DIPROPIONATE: 10; .64 CREAM TOPICAL at 09:00

## 2019-03-22 RX ADMIN — VANCOMYCIN HYDROCHLORIDE 1500 MG: 5 INJECTION, POWDER, LYOPHILIZED, FOR SOLUTION INTRAVENOUS at 00:09

## 2019-03-22 RX ADMIN — VANCOMYCIN HYDROCHLORIDE 2000 MG: 1 INJECTION, POWDER, LYOPHILIZED, FOR SOLUTION INTRAVENOUS at 22:59

## 2019-03-22 RX ADMIN — ENOXAPARIN SODIUM 40 MG: 40 INJECTION SUBCUTANEOUS at 09:33

## 2019-03-22 RX ADMIN — VANCOMYCIN HYDROCHLORIDE 1500 MG: 5 INJECTION, POWDER, LYOPHILIZED, FOR SOLUTION INTRAVENOUS at 17:00

## 2019-03-22 RX ADMIN — VANCOMYCIN HYDROCHLORIDE 1500 MG: 5 INJECTION, POWDER, LYOPHILIZED, FOR SOLUTION INTRAVENOUS at 09:40

## 2019-03-22 RX ADMIN — NICOTINE 1 PATCH: 14 PATCH, EXTENDED RELEASE TRANSDERMAL at 09:37

## 2019-03-22 RX ADMIN — ACETAMINOPHEN 650 MG: 325 TABLET ORAL at 09:32

## 2019-03-22 RX ADMIN — SODIUM CHLORIDE 100 ML/HR: 0.9 INJECTION, SOLUTION INTRAVENOUS at 00:09

## 2019-03-22 RX ADMIN — POTASSIUM CHLORIDE 40 MEQ: 1500 TABLET, EXTENDED RELEASE ORAL at 09:32

## 2019-03-22 RX ADMIN — ACETAMINOPHEN 650 MG: 325 TABLET ORAL at 19:35

## 2019-03-22 RX ADMIN — CLOTRIMAZOLE AND BETAMETHASONE DIPROPIONATE 1 APPLICATION: 10; .64 CREAM TOPICAL at 21:13

## 2019-03-22 RX ADMIN — DIPHENHYDRAMINE HCL 25 MG: 25 TABLET, FILM COATED ORAL at 01:36

## 2019-03-22 NOTE — SOCIAL WORK
Met with pt who provided information for initial assessment  Pt lives with his significant other  PTA independent  Pt is currently uninsured and unable to recall how long it has been since he has not had any insurance  Pt stated his SO has applied for some kind of "welfare" assistance for pt a week ago but unable to state if he for what kind of assistance  Informed that setting up VNA will be a barrier since pt has no insurance  Pt stated he feels he could do the dressing himself with education  Emailed [facility] to see if they had seen pt  Will continue to follow

## 2019-03-22 NOTE — ASSESSMENT & PLAN NOTE
· Patient reports worsening cough and fevers over the last several days; denies any nasal congestion/sore throat/myalgias  Did not receive a flu vaccine this year  · Met sepsis criteria at time of admission as above  · Continue IV vancomycin alone  · Infectious Diseases following with antibiotic management  · Procalcitonin was negative at 0 17 on admission is currently now increased to 0 39  However patient has no overt clinical evidence of bacterial pneumonia as fevers have improved and patient denies any respiratory complaints  · Will follow up procalcitonin in the [facility] · Urine antigens for strep pneumo and Legionella both negative  Influenza A and B and RSV PCR negative

## 2019-03-22 NOTE — PROGRESS NOTES
Progress Note - General Surgery   Antwan Lugo 33 y o  male MRN: [id number]  Unit/Bed#: E5 -01 Encounter: [id number]    Assessment:  76-year-old obese male with no past medical history presented with sepsis and abscess of the left breast, as well as CT evidence of right lower lobe pneumonia  Plan:  1  Left Breast Abscess  -Patient may shower daily while allowing soap and water to run over the incision and drainage site  Allow air drying    -Continue current paint control regimen  -Continue antibiotics per medicine  -Would recommend transitioning to oral antibiotics x 7 days upon discharge  -Consult to CM for VNA  -If unable to obtain home wound care nursing, please provide patient with supplies to pack wound (Saline, 1/4 inch plain packing, 4x4 gauze, paper tape)  -Wet to dry dressing changes 2x daily   -Wound instructions discussed with patient and wife  -Follow-up as outpatient with Dr Ramona Verdugo in 1 week  Instructions placed in discharge instructions section   -Okay to discharge from a surgical standpoint    Subjective/Objective   Chief Complaint: Pain of the left breast    Subjective:  Patient is feeling much better today  He states his pain is well controlled and admits to the surrounding area of erythema improving  He is tolerating his diet  He admits to being worried about completing the packing changes on his own  He denies any fever, chills, nausea, vomiting, chest pain, shortness of breath, change in bowel or bladder habits  He has no other complaints at this time  Objective:     Blood pressure 117/77, pulse 100, temperature 98 6 °F (37 °C), temperature source Temporal, resp  rate 20, height 5' 10 98" (1 803 m), weight (!) 137 kg (300 lb 14 9 oz), SpO2 94 %  ,Body mass index is 41 99 kg/m²        Intake/Output Summary (Last 24 hours) at 3/22/2019 0930  Last data filed at 3/22/2019 0009  Gross per 24 hour   Intake 2255 ml   Output    Net 2255 ml       Invasive Devices     Peripheral Intravenous Line            Peripheral IV 03/20/19 Right Antecubital 1 day                Physical Exam:   General: Pt is AAOx3, lying down in bed in NAD  HEENT: no scleral icterus, moist mucous membranes  Neck: Supple, non-tender  CV: RRR, no murmurs, gallops, rubs  S1 and S2  Resp: Lung sounds clear to auscultation B/L, normal respiratory effort no wheezes, rhonchi, rhales  Abd: Soft, with no tenderness, non-distended, non-tympanitic  Normoactive bowelsounds all 4 quadrants  Ext: Warm with no cyanosis, no edema, no deformities  ROM intact  Skin: Tenderness upon palpation of the left inferior breast  Left breast surrounding area of erythema with improvement  No fluctuance or further induration noted  No active drainage present  Unable to milk any further excess drainage  Wound re-packed  No other rashes, bruises, ulcers noted          Lab, Imaging and other studies:  CBC:   Lab Results   Component Value Date    WBC 4 71 03/22/2019    HGB 13 2 03/22/2019    HCT 39 5 03/22/2019    MCV 86 03/22/2019     03/22/2019    MCH 28 8 03/22/2019    MCHC 33 4 03/22/2019    RDW 13 4 03/22/2019    MPV 10 6 03/22/2019    NRBC 0 03/22/2019    NRBC 1 03/22/2019   , CMP:   Lab Results   Component Value Date    SODIUM 134 (L) 03/22/2019    K 3 4 (L) 03/22/2019    CL 99 (L) 03/22/2019    CO2 23 03/22/2019    BUN 8 03/22/2019    CREATININE 0 76 03/22/2019    CALCIUM 8 1 (L) 03/22/2019    EGFR 123 03/22/2019     VTE Pharmacologic Prophylaxis: Sequential compression device (Venodyne)   VTE Mechanical Prophylaxis: sequential compression device     Salinas Dominguez PA-C

## 2019-03-22 NOTE — PROGRESS NOTES
Vancomycin Assessment    Antwan Trevizo is a 33 y o  male who is currently receiving vancomycin 1500mg q8h for other sepsis with breast abcess   Relevant clinical data and objective history reviewed:  Creatinine   Date Value Ref Range Status   03/22/2019 0 76 0 60 - 1 30 mg/dL Final     Comment:     Standardized to IDMS reference method   03/21/2019 0 87 0 60 - 1 30 mg/dL Final     Comment:     Standardized to IDMS reference method   03/20/2019 1 03 0 60 - 1 30 mg/dL Final     Comment:     Standardized to IDMS reference method     /64 (BP Location: Left arm)   Pulse 96   Temp 98 8 °F (37 1 °C) (Temporal)   Resp 20   Ht 5' 10 98" (1 803 m)   Wt (!) 137 kg (300 lb 14 9 oz)   SpO2 94%   BMI 41 99 kg/m²   I/O last 3 completed shifts: In: 2355 [I V :2255; IV Piggyback:100]  Out: -   Lab Results   Component Value Date/Time    BUN 8 03/22/2019 05:57 AM    WBC 4 71 03/22/2019 05:57 AM    HGB 13 2 03/22/2019 05:57 AM    HCT 39 5 03/22/2019 05:57 AM    MCV 86 03/22/2019 05:57 AM     03/22/2019 05:57 AM     Temp Readings from Last 3 Encounters:   03/22/19 98 8 °F (37 1 °C) (Temporal)   03/18/19 99 °F (37 2 °C) (Oral)   05/24/16 98 4 °F (36 9 °C) (Oral)     Vancomycin Days of Therapy: 3    Assessment/Plan  The patient is currently on vancomycin utilizing scheduled dosing  Baseline risks associated with therapy include: The patient is receiving 1500mg q8h with the most recent vancomycin level being at steady-state and sub-therapeutic based on a goal of 15-20 (appropriate for most indications) ; therefore, after clinical evaluation will be changed to 2000mg q6h   Pharmacy will continue to follow closely for s/sx of nephrotoxicity, infusion reactions and appropriateness of therapy  BMP and CBC will be ordered per protocol  Plan for trough as patient approaches steady state, prior to the 4th  dose at approximately 1630 3/23/   Pharmacy will continue to follow the patient?s culture results and clinical progress daily      Gale Tovar, Pharmacist

## 2019-03-22 NOTE — DISCHARGE INSTRUCTIONS
[facility] discharge instructions    1  No driving 1 week while taking pain medications    2  No strenuous activity for 2 weeks  3   You may shower over the wound with soap and water and allow wound to air dry  Re-pack with Wet to Dry dressings 2x daily  (Saline, 1/4 inch plain packing, 4x4 gauze, secure with paper tape)    4  Notify the office for development of fevers, chills, worsening pain, loss of appetite  5   Call [phone] with any questions or concerns  May call the office for a work related note

## 2019-03-22 NOTE — ASSESSMENT & PLAN NOTE
Patient's sepsis on presentation likely due to left breast abscess  CT chest with contrast showed right lower lobe pneumonia with 3 6 x 1 7 cm lesion of the left breast in close proximity to the dermis  Possibly an abscess  Ultrasound of left breast showed organizing heterogenous collection containing a large amount of debris in the area of palpable clinical concern consistent with an abscess  Patient has been maintained on IV vancomycin  Infectious Diseases following patient and managing antibiotics  Patient has no overt clinical evidence of bacterial pneumonia as he is afebrile, without hypoxia, and not tachycardic  His procalcitonin was initially negative at 0 17 and elevated to 0 39 today  Blood cultures show negative growth to date  Influenza A and B and RSV PCR negative, and urine for Legionella antigen and strep pneumo both negative  Wound culture shows gram-positive cocci in pairs, awaiting wound culture sensitivities

## 2019-03-22 NOTE — PLAN OF CARE
Problem: Nutrition/Hydration-ADULT  Goal: Nutrient/Hydration intake appropriate for improving, restoring or maintaining nutritional needs  Description  Monitor and assess patient's nutrition/hydration status for malnutrition (ex- brittle hair, bruises, dry skin, pale skin and conjunctiva, muscle wasting, smooth red tongue, and disorientation)  Collaborate with interdisciplinary team and initiate plan and interventions as ordered  Monitor patient's weight and dietary intake as ordered or per policy  Utilize nutrition screening tool and intervene per policy  Determine patient's food preferences and provide high-protein, high-caloric foods as appropriate       INTERVENTIONS:  - Monitor oral intake, urinary output, labs, and treatment plans  - Assess nutrition and hydration status and recommend course of action  - Evaluate amount of meals eaten  - Assist patient with eating if necessary   - Allow adequate time for meals  - Recommend/ encourage appropriate diets, oral nutritional supplements, and vitamin/mineral supplements  - Order, calculate, and assess calorie counts as needed  - Recommend, monitor, and adjust tube feedings and TPN/PPN based on assessed needs  - Assess need for intravenous fluids  - Provide specific nutrition/hydration education as appropriate  - Include patient/family/caregiver in decisions related to nutrition  Outcome: Progressing     Problem: PAIN - ADULT  Goal: Verbalizes/displays adequate comfort level or baseline comfort level  Description  Interventions:  - Encourage patient to monitor pain and request assistance  - Assess pain using appropriate pain scale  - Administer analgesics based on type and severity of pain and evaluate response  - Implement non-pharmacological measures as appropriate and evaluate response  - Consider cultural and social influences on pain and pain management  - Notify physician/advanced practitioner if interventions unsuccessful or patient reports new pain  Outcome: Progressing     Problem: INFECTION - ADULT  Goal: Absence or prevention of progression during hospitalization  Description  INTERVENTIONS:  - Assess and monitor for signs and symptoms of infection  - Monitor lab/diagnostic results  - Monitor all insertion sites, i e  indwelling lines, tubes, and drains  - Monitor endotracheal (as able) and nasal secretions for changes in amount and color  - [place] appropriate cooling/warming therapies per order  - Administer medications as ordered  - Instruct and encourage patient and family to use good hand hygiene technique  - Identify and instruct in appropriate isolation precautions for identified infection/condition  Outcome: Progressing  Goal: Absence of fever/infection during neutropenic period  Description  INTERVENTIONS:  - Monitor WBC  - Implement neutropenic guidelines  Outcome: Progressing     Problem: DISCHARGE PLANNING  Goal: Discharge to home or other facility with appropriate resources  Description  INTERVENTIONS:  - Identify barriers to discharge w/patient and caregiver  - Arrange for needed discharge resources and transportation as appropriate  - Identify discharge learning needs (meds, wound care, etc )  - Arrange for interpretive services to assist at discharge as needed  - Refer to Case Management Department for coordinating discharge planning if the patient needs post-hospital services based on physician/advanced practitioner order or complex needs related to functional status, cognitive ability, or social support system  Outcome: Progressing     Problem: Knowledge Deficit  Goal: Patient/family/caregiver demonstrates understanding of disease process, treatment plan, medications, and discharge instructions  Description  Complete learning assessment and assess knowledge base    Interventions:  - Provide teaching at level of understanding  - Provide teaching via preferred learning methods  Outcome: Progressing     Problem: SKIN/TISSUE INTEGRITY - ADULT  Goal: Skin integrity remains intact  Description  INTERVENTIONS  - Identify patients at risk for skin breakdown  - Assess and monitor skin integrity  - Assess and monitor nutrition and hydration status  - Monitor labs (i e  albumin)  - Assess for incontinence   - Turn and reposition patient  - Assist with mobility/ambulation  - Relieve pressure over bony prominences  - Avoid friction and shearing  - Provide appropriate hygiene as needed including keeping skin clean and dry  - Evaluate need for skin moisturizer/barrier cream  - Collaborate with interdisciplinary team (i e  Nutrition, Rehabilitation, etc )   - Patient/family teaching  Outcome: Progressing  Goal: Incision(s), wounds(s) or drain site(s) healing without S/S of infection  Description  INTERVENTIONS  - Assess and document risk factors for skin impairment   - Assess and document dressing, incision, wound bed, drain sites and surrounding tissue  - Initiate Nutrition services consult and/or wound management as needed  Outcome: Progressing  Goal: Oral mucous membranes remain intact  Description  INTERVENTIONS  - Assess oral mucosa and hygiene practices  - Implement preventative oral hygiene regimen  - Implement oral medicated treatments as ordered  - Initiate Nutrition services referral as needed  Outcome: Progressing     Problem: RESPIRATORY - ADULT  Goal: Achieves optimal ventilation and oxygenation  Description  INTERVENTIONS:  - Assess for changes in respiratory status  - Assess for changes in mentation and behavior  - Position to facilitate oxygenation and minimize respiratory effort  - Oxygen administration by appropriate delivery method based on oxygen saturation (per order) or ABGs  - Initiate smoking cessation education as indicated  - Encourage broncho-pulmonary hygiene including cough, deep breathe, Incentive Spirometry  - Assess the need for suctioning and aspirate as needed  - Assess and instruct to report SOB or any respiratory difficulty  - Respiratory Therapy support as indicated  Outcome: Progressing

## 2019-03-22 NOTE — PROGRESS NOTES
Progress Note - Antwan Lugo 1990, 33 y o  male MRN: [id number]    Unit/Bed#: E5 -01 Encounter: [id number]    Primary Care Provider: Sowmya Varela DO   Date and time admitted to hospital: 3/20/2019  6:33 PM        * Sepsis [facility])  Assessment & Plan  Patient's sepsis on presentation likely due to left breast abscess  CT chest with contrast showed right lower lobe pneumonia with 3 6 x 1 7 cm lesion of the left breast in close proximity to the dermis  Possibly an abscess  Ultrasound of left breast showed organizing heterogenous collection containing a large amount of debris in the area of palpable clinical concern consistent with an abscess  Patient has been maintained on IV vancomycin  Infectious Diseases following patient and managing antibiotics  Patient has no overt clinical evidence of bacterial pneumonia as he is afebrile, without hypoxia, and not tachycardic  His procalcitonin was initially negative at 0 17 and elevated to 0 39 today  Blood cultures show negative growth to date  Influenza A and B and RSV PCR negative, and urine for Legionella antigen and strep pneumo both negative  Wound culture shows gram-positive cocci in pairs, awaiting wound culture sensitivities  Abscess of left breast  Assessment & Plan  · Was recently evaluated in the ED on Monday - ultrasound at bedside did not show any focal abscess  Discharged home on oral keflex and bactrim which patient reports compliance  Cellulitic area was outlined to monitor progression  · Patient states the redness is slightly worse; painful to the touch  No drainage noted, no induration  · CT chest: "3 6 x 1 7 cm lesion of the left breast in close proximity to the dermis  It cannot be determined on the basis of this CT if this represents a dermal lesion, phlegmon or less likely abscess    Given proximity to skin surface, ultrasound would be the preferred modality for evaluation "  · Ultrasound of left breast shows abscess formation  · Patient was seen and evaluated by General surgery and bedside incision and drainage was performed 3/21  · Continue IV vancomycin as recommended by Infectious Diseases  · Wound cultures showing gram-positive cocci in pairs, blood cultures negative growth to date  · Patient cleared by General surgery for discharge with daily wet to dry dressings with 2 x 2 gauze before and after shower  Community acquired pneumonia  Assessment & Plan  · Patient reports worsening cough and fevers over the last several days; denies any nasal congestion/sore throat/myalgias  Did not receive a flu vaccine this year  · Met sepsis criteria at time of admission as above  · Continue IV vancomycin alone  · Infectious Diseases following with antibiotic management  · Procalcitonin was negative at 0 17 on admission is currently now increased to 0 39  However patient has no overt clinical evidence of bacterial pneumonia as fevers have improved and patient denies any respiratory complaints  · Will follow up [facility]calcitonin in the [facility] · Urine antigens for strep pneumo and Legionella both negative  Influenza A and B and RSV PCR negative  Morbid obesity with BMI of 40 0-44 9, adult [facility])  Assessment & Plan  · Counseled patient on lifestyle modifications including diet and exercise  · May benefit from outpatient bariatric referral        VTE Pharmacologic Prophylaxis:   Pharmacologic: Enoxaparin (Lovenox)  Mechanical VTE Prophylaxis in Place: No    Patient Centered Rounds: I have performed bedside rounds with nursing staff today  Discussions with Specialists or Other Care Team Provider:  Yes    Education and Discussions with Family / Patient:  Yes    Time Spent for Care: 20 minutes  More than 50% of total time spent on counseling and coordination of care as described above      Current Length of Stay: 2 day(s)    Current Patient Status: Inpatient   Certification Statement: The patient will continue to require additional inpatient hospital stay due to Left breast abscess    Discharge Plan: To be determined    Code Status: Level 1 - Full Code      Subjective:   Patient complaining of 1 episode of cough with pinkish sputum  Otherwise denies any fevers, chills, shortness of breath  Objective:     Vitals:   Temp (24hrs), Av °F (37 8 °C), Min:98 6 °F (37 °C), Max:101 3 °F (38 5 °C)    Temp:  [98 6 °F (37 °C)-101 3 °F (38 5 °C)] 98 6 °F (37 °C)  HR:  [100-101] 100  Resp:  [20] 20  BP: (117-134)/(59-77) 117/77  SpO2:  [93 %-94 %] 94 %  Body mass index is 41 99 kg/m²  Input and Output Summary (last 24 hours): Intake/Output Summary (Last 24 hours) at 3/22/2019 1532  Last data filed at 3/22/2019 0009  Gross per 24 hour   Intake 2255 ml   Output    Net 2255 ml       Physical Exam:     Physical Exam   Constitutional: He is oriented to person, place, and time  He appears well-developed and well-nourished  No distress  HENT:   Head: Normocephalic and atraumatic  Neck: Normal range of motion  Neck supple  No JVD present  Cardiovascular: Normal rate, regular rhythm and normal heart sounds  Pulmonary/Chest: Effort normal and breath sounds normal  He has no wheezes  He has no rales  Abdominal: Soft  Bowel sounds are normal    Musculoskeletal: He exhibits no edema or tenderness  Lymphadenopathy:     He has no cervical adenopathy  Neurological: He is alert and oriented to person, place, and time  Skin: Skin is warm and dry  He is not diaphoretic  Area of erythema with tenderness underneath left breast and with clean, dry, and intact bandage dressing  Psychiatric: He has a normal mood and affect   His behavior is normal        Additional Data:     Labs:    Results from last 7 days   Lab Units 19  0557  19  1850   WBC Thousand/uL 4 71   < > 6 43   HEMOGLOBIN g/dL 13 2   < > 13 8   HEMATOCRIT % 39 5   < > 40 4   PLATELETS Thousands/uL 170   < > 165   BANDS PCT % 6  --   --    NEUTROS PCT %  -- --  78*   LYMPHS PCT %  --   --  13*   LYMPHO PCT % 23  --   --    MONOS PCT %  --   --  7   MONO PCT % 1*  --   --    EOS PCT % 0  --  0    < > = values in this interval not displayed  Results from last 7 days   Lab Units 03/22/19  0557 03/21/19  0437   SODIUM mmol/L 134* 132*   POTASSIUM mmol/L 3 4* 3 6   CHLORIDE mmol/L 99* 99*   CO2 mmol/L 23 20*   BUN mg/dL 8 8   CREATININE mg/dL 0 76 0 87   ANION GAP mmol/L 12 13   CALCIUM mg/dL 8 1* 8 1*   ALBUMIN g/dL  --  2 6*   TOTAL BILIRUBIN mg/dL  --  0 47   ALK PHOS U/L  --  52   ALT U/L  --  53   AST U/L  --  64*   GLUCOSE RANDOM mg/dL 92 96     Results from last 7 days   Lab Units 03/20/19  1850   INR  1 18*             Results from last 7 days   Lab Units 03/22/19  0557 03/20/19  1909 03/20/19  1850   LACTIC ACID mmol/L  --  0 9  --    PROCALCITONIN ng/ml 0 39*  --  0 17           * I Have Reviewed All Lab Data Listed Above  * Additional Pertinent Lab Tests Reviewed: [address] Admission Reviewed    Imaging:    Imaging Reports Reviewed Today Include:  None available  Imaging Personally Reviewed by Myself Includes:  None    Recent Cultures (last 7 days):     Results from last 7 days   Lab Units 03/22/19  0413 03/21/19  1459 03/21/19  0434 03/20/19  2247 03/20/19  1909 03/20/19  1908 03/20/19  1850   BLOOD CULTURE   --   --   --   --  No Growth at 24 hrs   --  No Growth at 24 hrs     SPUTUM CULTURE   --   --  Culture too young- will reincubate  --   --   --   --    GRAM STAIN RESULT   --  1+ Polys*  2+ Gram positive cocci in pairs* 1+ Epithelial cells per low power field*  2+ Polys*  2+ Gram positive rods*  1+ Yeast*  --   --   --   --    WOUND CULTURE   --  No growth  --   --   --   --   --    INFLUENZA B PCR   --   --   --   --   --  Not Detected  --    RSV PCR   --   --   --   --   --  Not Detected  --    LEGIONELLA URINARY ANTIGEN   --   --   --  Negative  --   --   --    C DIFF TOXIN B  NEGATIVE for C difficle toxin by PCR    --   -- --   --   --   --        Last 24 Hours Medication List:     Current Facility-Administered Medications:  acetaminophen 650 mg Oral Q6H PRN Meagan Beck PA-C    benzonatate 100 mg Oral TID PRN Meagan Beck PA-C    clotrimazole-betamethasone  Topical BID Darlis Closs Gyarmaty, PA-C    diphenhydrAMINE 25 mg Oral HS PRN Rachel Springer PA-C    enoxaparin 40 mg Subcutaneous Daily Darlis Closs [place], PA-C    nicotine 1 patch Transdermal Daily Darlis Closs [place], PA-C    ondansetron 4 mg Intravenous Q6H PRN Darlis Closs Gyarmaty, PA-C    vancomycin 1,500 mg Intravenous Q8H Suzette Abbasi DO Last Rate: 1,500 mg (03/22/19 0940)        Today, Patient Was Seen By: Chris Huynh MD    ** Please Note: Dictation voice to text software may have been used in the creation of this document   **

## 2019-03-22 NOTE — ASSESSMENT & PLAN NOTE
· Was recently evaluated in the ED on Monday - ultrasound at bedside did not show any focal abscess  Discharged home on oral keflex and bactrim which patient reports compliance  Cellulitic area was outlined to monitor progression  · Patient states the redness is slightly worse; painful to the touch  No drainage noted, no induration  · CT chest: "3 6 x 1 7 cm lesion of the left breast in close proximity to the dermis  It cannot be determined on the basis of this CT if this represents a dermal lesion, phlegmon or less likely abscess  Given proximity to skin surface, ultrasound would be the preferred modality for evaluation "  · Ultrasound of left breast shows abscess formation  · Patient was seen and evaluated by General surgery and bedside incision and drainage was performed 3/21  · Continue IV vancomycin as recommended by Infectious Diseases  · Wound cultures showing gram-positive cocci in pairs, blood cultures negative growth to date  · Patient cleared by General surgery for discharge with daily wet to dry dressings with 2 x 2 gauze before and after shower

## 2019-03-23 LAB
ANION GAP SERPL CALCULATED.3IONS-SCNC: 8 MMOL/L (ref 4–13)
BACTERIA SPT RESP CULT: ABNORMAL
BASOPHILS # BLD AUTO: 0.02 THOUSANDS/ΜL (ref 0–0.1)
BASOPHILS NFR BLD AUTO: 0 % (ref 0–1)
BUN SERPL-MCNC: 10 MG/DL (ref 5–25)
CALCIUM SERPL-MCNC: 8 MG/DL (ref 8.3–10.1)
CHLORIDE SERPL-SCNC: 104 MMOL/L (ref 100–108)
CO2 SERPL-SCNC: 26 MMOL/L (ref 21–32)
CREAT SERPL-MCNC: 0.74 MG/DL (ref 0.6–1.3)
EOSINOPHIL # BLD AUTO: 0.01 THOUSAND/ΜL (ref 0–0.61)
EOSINOPHIL NFR BLD AUTO: 0 % (ref 0–6)
ERYTHROCYTE [DISTWIDTH] IN BLOOD BY AUTOMATED COUNT: 13.7 % (ref 11.6–15.1)
GFR SERPL CREATININE-BSD FRML MDRD: 125 ML/MIN/1.73SQ M
GLUCOSE SERPL-MCNC: 91 MG/DL (ref 65–140)
GRAM STN SPEC: ABNORMAL
HCT VFR BLD AUTO: 39.3 % (ref 36.5–49.3)
HGB BLD-MCNC: 12.9 G/DL (ref 12–17)
IMM GRANULOCYTES # BLD AUTO: 0.18 THOUSAND/UL (ref 0–0.2)
IMM GRANULOCYTES NFR BLD AUTO: 4 % (ref 0–2)
LYMPHOCYTES # BLD AUTO: 1.55 THOUSANDS/ΜL (ref 0.6–4.47)
LYMPHOCYTES NFR BLD AUTO: 34 % (ref 14–44)
MAGNESIUM SERPL-MCNC: 2.5 MG/DL (ref 1.6–2.6)
MCH RBC QN AUTO: 28.9 PG (ref 26.8–34.3)
MCHC RBC AUTO-ENTMCNC: 32.8 G/DL (ref 31.4–37.4)
MCV RBC AUTO: 88 FL (ref 82–98)
MONOCYTES # BLD AUTO: 0.36 THOUSAND/ΜL (ref 0.17–1.22)
MONOCYTES NFR BLD AUTO: 8 % (ref 4–12)
NEUTROPHILS # BLD AUTO: 2.42 THOUSANDS/ΜL (ref 1.85–7.62)
NEUTS SEG NFR BLD AUTO: 54 % (ref 43–75)
NRBC BLD AUTO-RTO: 0 /100 WBCS
PLATELET # BLD AUTO: 182 THOUSANDS/UL (ref 149–390)
PMV BLD AUTO: 10.2 FL (ref 8.9–12.7)
POTASSIUM SERPL-SCNC: 3.8 MMOL/L (ref 3.5–5.3)
PROCALCITONIN SERPL-MCNC: 0.31 NG/ML
RBC # BLD AUTO: 4.46 MILLION/UL (ref 3.88–5.62)
SODIUM SERPL-SCNC: 138 MMOL/L (ref 136–145)
VANCOMYCIN TROUGH SERPL-MCNC: 15.9 UG/ML (ref 10–20)
WBC # BLD AUTO: 4.54 THOUSAND/UL (ref 4.31–10.16)

## 2019-03-23 PROCEDURE — 84145 PROCALCITONIN (PCT): CPT | Performed by: INTERNAL MEDICINE

## 2019-03-23 PROCEDURE — 99024 POSTOP FOLLOW-UP VISIT: CPT | Performed by: SURGERY

## 2019-03-23 PROCEDURE — 99232 SBSQ HOSP IP/OBS MODERATE 35: CPT | Performed by: INTERNAL MEDICINE

## 2019-03-23 PROCEDURE — 80202 ASSAY OF VANCOMYCIN: CPT | Performed by: INTERNAL MEDICINE

## 2019-03-23 PROCEDURE — 80048 BASIC METABOLIC PNL TOTAL CA: CPT | Performed by: FAMILY MEDICINE

## 2019-03-23 PROCEDURE — 85025 COMPLETE CBC W/AUTO DIFF WBC: CPT | Performed by: FAMILY MEDICINE

## 2019-03-23 PROCEDURE — 83735 ASSAY OF MAGNESIUM: CPT | Performed by: FAMILY MEDICINE

## 2019-03-23 PROCEDURE — 99232 SBSQ HOSP IP/OBS MODERATE 35: CPT | Performed by: FAMILY MEDICINE

## 2019-03-23 RX ADMIN — CLOTRIMAZOLE AND BETAMETHASONE DIPROPIONATE: 10; .64 CREAM TOPICAL at 09:45

## 2019-03-23 RX ADMIN — ENOXAPARIN SODIUM 40 MG: 40 INJECTION SUBCUTANEOUS at 08:33

## 2019-03-23 RX ADMIN — VANCOMYCIN HYDROCHLORIDE 2000 MG: 1 INJECTION, POWDER, LYOPHILIZED, FOR SOLUTION INTRAVENOUS at 22:40

## 2019-03-23 RX ADMIN — VANCOMYCIN HYDROCHLORIDE 2000 MG: 1 INJECTION, POWDER, LYOPHILIZED, FOR SOLUTION INTRAVENOUS at 16:42

## 2019-03-23 RX ADMIN — VANCOMYCIN HYDROCHLORIDE 2000 MG: 1 INJECTION, POWDER, LYOPHILIZED, FOR SOLUTION INTRAVENOUS at 05:11

## 2019-03-23 RX ADMIN — NICOTINE 1 PATCH: 14 PATCH, EXTENDED RELEASE TRANSDERMAL at 08:35

## 2019-03-23 RX ADMIN — VANCOMYCIN HYDROCHLORIDE 2000 MG: 1 INJECTION, POWDER, LYOPHILIZED, FOR SOLUTION INTRAVENOUS at 10:59

## 2019-03-23 NOTE — PROGRESS NOTES
Progress Note - Infectious Disease   Antwan Lugo 33 y o  male MRN: [id number]  Unit/Bed#: E5 -01 Encounter: [id number]      Impression/Recommendations:  1  Sepsis, POA   Fever, tachycardia  Peter Sinning all related to left breast infection   Low clinical suspicion that patient also has bacterial pneumonia despite CT chest that indicated possible right lower lobe pneumonia   Patient with no significant symptoms to suggest bacterial pneumonia   No hypoxia   Procalcitonin level was negative on admission but increased mildly  Fevers have improved after drainage of abscess  Patient still does not have any significant respiratory complaints  Admission blood cultures are negative      -antibiotic plan as below  -follow up pending blood cultures  -monitor temperatures and hemodynamics  -supportive care  -follow-up procalcitonin from today     2  Left breast cellulitis with abscess   Now status post bedside I and D  Likely refractory to oral Keflex/Bactrim treatment due to development of undrained abscess   High suspicion for Staph infection, possibly MRSA      -continue IV vancomycin with dosing recommendations per pharmacy  -followup wound culture to guide final antibiotic recommendations  -serial exams  -wound care per surgery  -hopefully transition to p o  antibiotic soon     3  Abnormal CT chest   CT chest suggested possible right lower lobe pneumonia   Doubt this is the cause of #1 as we have another clear explanation   No overt clinical evidence to suggest bacterial pneumonia   Procalcitonin level was negative  Repeat procalcitonin is 0 39  Fevers have improved  Patient denies respiratory complaints  Repeat procalcitonin level pending  Monitor symptoms and temperatures for now      4   Morbid obesity   Likely risk factor for development of severe breast infection   Recommend weight loss, dietary changes      Discussed with patient in detail regarding the above plan      Antibiotics:  Antibiotic 4  Vancomycin 3  Post-drainage day 2    Subjective:  Patient feels well  Left chest wall pain much improved  Temperature stays down  No chills  He is tolerating antibiotic well  No nausea, vomiting or diarrhea  Objective:  Vitals:  Temp:  [97 7 °F (36 5 °C)-98 8 °F (37 1 °C)] 97 7 °F (36 5 °C)  HR:  [82-96] 85  Resp:  [18-20] 20  BP: (117-127)/(58-64) 117/59  SpO2:  [94 %-95 %] 95 %  Temp (24hrs), Av 1 °F (36 7 °C), Min:97 7 °F (36 5 °C), Max:98 8 °F (37 1 °C)  Current: Temperature: 97 7 °F (36 5 °C)    Physical Exam:     General: Awake, alert, cooperative, no distress  Chest:  Wound packed  No purulence  Improved erythema/warmth  Minimal tenderness  Neck:  Supple  No mass  No lymphadenopathy  Lungs: Expansion symmetric, no rales, no wheezing, respirations unlabored  Heart:  Regular rate and rhythm, S1 and S2 normal, no murmur  Abdomen: Soft, nondistended, non-tender, bowel sounds active all four quadrants,        no masses, no organomegaly  Extremities: No edema  No erythema/warmth  No ulcer  Nontender to palpation  Skin:  No rash  Neuro: Moves all extremities  Invasive Devices     Peripheral Intravenous Line            Peripheral IV 19 Right Antecubital 2 days                Labs studies:   I have personally reviewed pertinent labs    Results from last 7 days   Lab Units 19  0507 19  0557 19  0437 19  1850 19  1639   POTASSIUM mmol/L 3 8 3 4* 3 6 3 7 3 9   CHLORIDE mmol/L 104 99* 99* 98* 101   CO2 mmol/L 26 23 20* 22 25   BUN mg/dL 10 8 8 9 9   CREATININE mg/dL 0 74 0 76 0 87 1 03 0 96   EGFR ml/min/1 73sq m 125 123 117 98 106   CALCIUM mg/dL 8 0* 8 1* 8 1* 8 5 9 1   AST U/L  --   --  64* 65* 45   ALT U/L  --   --  53 56 74   ALK PHOS U/L  --   --  52 61 80     Results from last 7 days   Lab Units 19  0507 19  0557 19  0437   WBC Thousand/uL 4 54 4 71 5 36   HEMOGLOBIN g/dL 12 9 13 2 12 7 PLATELETS Thousands/uL 182 170 148*     Results from last 7 days   Lab Units 03/22/19  0413 03/21/19  1459 03/21/19  0434 03/20/19  2247 03/20/19  1909 03/20/19  1908 03/20/19  1850   BLOOD CULTURE   --   --   --   --  No Growth at 48 hrs  --  No Growth at 48 hrs  SPUTUM CULTURE   --   --  2+ Growth of Candida sp  presumptively albicans*  2+ Growth of   Commensal respiratory anneliese only; No significant growth of Staph aureus/MRSA or Pseudomonas aeruginosa  --   --   --   --    GRAM STAIN RESULT   --  1+ Polys*  2+ Gram positive cocci in pairs* 1+ Epithelial cells per low power field*  2+ Polys*  2+ Gram positive rods*  1+ Yeast*  --   --   --   --    WOUND CULTURE   --  Culture results to follow  --   --   --   --   --    INFLUENZA B PCR   --   --   --   --   --  Not Detected  --    RSV PCR   --   --   --   --   --  Not Detected  --    LEGIONELLA URINARY ANTIGEN   --   --   --  Negative  --   --   --    C DIFF TOXIN B  NEGATIVE for C difficle toxin by PCR    --   --   --   --   --   --        Imaging Studies:   I have personally reviewed pertinent imaging study reports and images in PACS  EKG, Pathology, and Other Studies:   I have personally reviewed pertinent reports

## 2019-03-23 NOTE — PROGRESS NOTES
Progress Note - Antwan Lugo 1990, 33 y o  male MRN: [id number]    Unit/Bed#: E5 -01 Encounter: [id number]    Primary Care Provider: Judy Verdugo,    Date and time admitted to hospital: 3/20/2019  6:33 PM        * Sepsis [facility])  Assessment & Plan  Patient's sepsis on presentation likely due to left breast abscess  CT chest with contrast showed right lower lobe pneumonia with 3 6 x 1 7 cm lesion of the left breast in close proximity to the dermis  Possibly an abscess  Ultrasound of left breast showed organizing heterogenous collection containing a large amount of debris in the area of palpable clinical concern consistent with an abscess  Patient has been maintained on IV vancomycin  Infectious Diseases following patient and managing antibiotics  Patient has no overt clinical evidence of bacterial pneumonia as he is afebrile, without hypoxia, and not tachycardic  His procalcitonin was initially negative at 0 17 and elevated to 0 39 on 03/22  Procalcitonin for 3/23 pending  Blood cultures show negative growth to date  Influenza A and B and RSV PCR negative, and urine for Legionella antigen and strep pneumo both negative  Wound culture shows gram-positive cocci in pairs, awaiting wound culture sensitivities  Abscess of left breast  Assessment & Plan  · Was recently evaluated in the ED on Monday - ultrasound at bedside did not show any focal abscess  Discharged home on oral keflex and bactrim which patient reports compliance  Cellulitic area was outlined to monitor progression  · Patient states the redness is slightly worse; painful to the touch  No drainage noted, no induration  · CT chest: "3 6 x 1 7 cm lesion of the left breast in close proximity to the dermis  It cannot be determined on the basis of this CT if this represents a dermal lesion, phlegmon or less likely abscess    Given proximity to skin surface, ultrasound would be the preferred modality for evaluation "  · Ultrasound of left breast shows abscess formation  · Patient was seen and evaluated by General surgery and bedside incision and drainage was performed 3/21  · Continue IV vancomycin as recommended by Infectious Diseases  · Wound cultures showing gram-positive cocci in pairs, blood cultures negative growth to date  · Patient cleared by General surgery for discharge with daily wet to dry dressings with 2 x 2 gauze before and after shower  Community acquired pneumonia  Assessment & Plan  · Patient reports worsening cough and fevers over the last several days; denies any nasal congestion/sore throat/myalgias  Did not receive a flu vaccine this year  · Met sepsis criteria at time of admission as above  · Continue IV vancomycin alone  · Infectious Diseases following with antibiotic management  · Procalcitonin was negative at 0 17 on admission is currently now increased to 0 39  However patient has no overt clinical evidence of bacterial pneumonia as fevers have improved and patient denies any respiratory complaints  · Will follow up procalcitonin in the a m Karrie Nissen · Urine antigens for strep pneumo and Legionella both negative  Influenza A and B and RSV PCR negative  Morbid obesity with BMI of 40 0-44 9, adult [facility])  Assessment & Plan  · Counseled patient on lifestyle modifications including diet and exercise  · May benefit from outpatient bariatric referral        VTE Pharmacologic Prophylaxis:   Pharmacologic: Enoxaparin (Lovenox)  Mechanical VTE Prophylaxis in Place: No    Patient Centered Rounds: I have performed bedside rounds with nursing staff today  Discussions with Specialists or Other Care Team Provider:  Yes    Education and Discussions with Family / Patient:  Yes    Time Spent for Care: 20 minutes  More than 50% of total time spent on counseling and coordination of care as described above      Current Length of Stay: 3 day(s)    Current Patient Status: Inpatient Certification Statement: The patient will continue to require additional inpatient hospital stay due to Left breast abscess    Discharge Plan: To be determined    Code Status: Level 1 - Full Code      Subjective:   Patient denies any fevers or chills  He states that the pain is much better  Objective:     Vitals:   Temp (24hrs), Av 1 °F (36 7 °C), Min:97 7 °F (36 5 °C), Max:98 8 °F (37 1 °C)    Temp:  [97 7 °F (36 5 °C)-98 8 °F (37 1 °C)] 97 7 °F (36 5 °C)  HR:  [82-96] 85  Resp:  [18-20] 20  BP: (117-127)/(58-64) 117/59  SpO2:  [94 %-95 %] 95 %  Body mass index is 41 99 kg/m²  Input and Output Summary (last 24 hours): Intake/Output Summary (Last 24 hours) at 3/23/2019 1128  Last data filed at 3/23/2019 0900  Gross per 24 hour   Intake 480 ml   Output    Net 480 ml       Physical Exam:     Physical Exam   Constitutional: He is oriented to person, place, and time  He appears well-developed and well-nourished  No distress  Morbidly obese body habitus   HENT:   Head: Normocephalic and atraumatic  Cardiovascular: Normal rate, regular rhythm and normal heart sounds  Pulmonary/Chest: Effort normal and breath sounds normal    Abdominal: Soft  Bowel sounds are normal    Musculoskeletal: Normal range of motion  He exhibits no edema, tenderness or deformity  Neurological: He is alert and oriented to person, place, and time  Skin: Skin is warm and dry  He is not diaphoretic  Skin underneath area of left breast less erythematous with clean dry and intact bandage dressing  Mildly to moderately tender to palpation         Additional Data:     Labs:    Results from last 7 days   Lab Units 19  0507 19  0557   WBC Thousand/uL 4 54 4 71   HEMOGLOBIN g/dL 12 9 13 2   HEMATOCRIT % 39 3 39 5   PLATELETS Thousands/uL 182 170   BANDS PCT %  --  6   NEUTROS PCT % 54  --    LYMPHS PCT % 34  --    LYMPHO PCT %  --  23   MONOS PCT % 8  --    MONO PCT %  --  1*   EOS PCT % 0 0     Results from last 7 days   Lab Units 03/23/19  0507  03/21/19  0437   SODIUM mmol/L 138   < > 132*   POTASSIUM mmol/L 3 8   < > 3 6   CHLORIDE mmol/L 104   < > 99*   CO2 mmol/L 26   < > 20*   BUN mg/dL 10   < > 8   CREATININE mg/dL 0 74   < > 0 87   ANION GAP mmol/L 8   < > 13   CALCIUM mg/dL 8 0*   < > 8 1*   ALBUMIN g/dL  --   --  2 6*   TOTAL BILIRUBIN mg/dL  --   --  0 47   ALK PHOS U/L  --   --  52   ALT U/L  --   --  53   AST U/L  --   --  64*   GLUCOSE RANDOM mg/dL 91   < > 96    < > = values in this interval not displayed  Results from last 7 days   Lab Units 03/20/19  1850   INR  1 18*             Results from last 7 days   Lab Units 03/22/19  0557 03/20/19  1909 03/20/19  1850   LACTIC ACID mmol/L  --  0 9  --    PROCALCITONIN ng/ml 0 39*  --  0 17           * I Have Reviewed All Lab Data Listed Above  * Additional Pertinent Lab Tests Reviewed: [address] Admission Reviewed    Imaging:    Imaging Reports Reviewed Today Include:  None available  Imaging Personally Reviewed by Myself Includes:  None    Recent Cultures (last 7 days):     Results from last 7 days   Lab Units 03/22/19  0413 03/21/19  1459 03/21/19  0434 03/20/19  2247 03/20/19  1909 03/20/19  1908 03/20/19  1850   BLOOD CULTURE   --   --   --   --  No Growth at 48 hrs  --  No Growth at 48 hrs  SPUTUM CULTURE   --   --  2+ Growth of Candida sp  presumptively albicans*  2+ Growth of   Commensal respiratory anneliese only; No significant growth of Staph aureus/MRSA or Pseudomonas aeruginosa  --   --   --   --    GRAM STAIN RESULT   --  1+ Polys*  2+ Gram positive cocci in pairs* 1+ Epithelial cells per low power field*  2+ Polys*  2+ Gram positive rods*  1+ Yeast*  --   --   --   --    WOUND CULTURE   --  Culture results to follow    --   --   --   --   --    INFLUENZA B PCR   --   --   --   --   --  Not Detected  --    RSV PCR   --   --   --   --   --  Not Detected  --    LEGIONELLA URINARY ANTIGEN   --   --   --  Negative  --   -- --    C DIFF TOXIN B  NEGATIVE for C difficle toxin by PCR    --   --   --   --   --   --        Last 24 Hours Medication List:     Current Facility-Administered Medications:  acetaminophen 650 mg Oral Q6H PRN Diana Rumpf, PA-C    benzonatate 100 mg Oral TID PRN Diana Rumpf, PA-C    clotrimazole-betamethasone  Topical BID Salome Hillman, PA-C    diphenhydrAMINE 25 mg Oral HS PRN Sergio Murali, PA-C    enoxaparin 40 mg Subcutaneous Daily Edindia Prosper Blairity, PA-C    nicotine 1 patch Transdermal Daily Arianeia Prosper Blairity, PA-C    ondansetron 4 mg Intravenous Q6H PRN Arianeia Prosper Hillman, PA-C    vancomycin 2,000 mg Intravenous Q6H Suzette Abbasi DO Last Rate: 2,000 mg (03/23/19 1059)        Today, Patient Was Seen By: Kyleigh Armstrong MD    ** Please Note: Dictation voice to text software may have been used in the creation of this document   **

## 2019-03-23 NOTE — PROGRESS NOTES
Progress Note - General Surgery   Antwan Lugo 33 y o  male MRN: [id number]    Assessment & Plan:   1  Status post left breast incisional, excisional debridement of small abscess in the infra mammary region  Wound much improved today with minimal induration and erythema  Open wound measures 2 by 1 cm and very shallow with few mm thick    Doing well status post left breast abscess drainage  Planned per medical service and Infectious Disease service in terms of antibiotics and discharge  Clear for discharge by surgical standpoint, wet to dry-dressing can be done daily at home after daily shower  2  Advance to Regular house diet     3  Incentive Spirometry, Encourage Ambulation    4  VTE Prophylaxis   Pharmacologic: Heparin   Mechanical: sequential compression device    5  Discharge Planning:  When cleared by Medicine    Subjective:  - Pain: denies  -   Objective:    Vitals:   Vitals:    03/21/19 1541 03/21/19 2300 03/22/19 1536 03/22/19 2300   BP: 134/59 117/77 127/64 124/58   BP Location: Left arm Left arm Left arm Left arm   Pulse: 101 100 96 82   Resp:  20  18   Temp: (!) 101 3 °F (38 5 °C) 98 6 °F (37 °C) 98 8 °F (37 1 °C) 97 8 °F (36 6 °C)   TempSrc: Temporal Temporal Temporal Temporal   SpO2: 93% 94% 94% 94%   Weight:       Height:           I/O:   I/O       03/21 0701 - 03/22 0700 03/22 0701 - 03/23 0700 03/23 0701 - 03/24 0700    I V  (mL/kg) 2255 (16 6)      IV Piggyback 100      Total Intake(mL/kg) 2355 (17 3)      Net +2355             Unmeasured Urine Occurrence 1 x            Labs:  Lab Results   Component Value Date    WBC 4 54 03/23/2019    HGB 12 9 03/23/2019    HCT 39 3 03/23/2019    MCV 88 03/23/2019     03/23/2019       Lab Results   Component Value Date    CALCIUM 8 0 (L) 03/23/2019    K 3 8 03/23/2019    CO2 26 03/23/2019     03/23/2019    BUN 10 03/23/2019    CREATININE 0 74 03/23/2019         Radiology: No new pertinent imaging studies       Exam:  General: alert, appears stated age and no distress  Chest: Normal chest wall and respirations  Clear to auscultation  Heart[de-identified] regular rate and rhythm, S1, S2 normal, no murmur, click, rub or gallop  Abdomen: abdomen is soft without significant tenderness, masses, organomegaly or guarding Bowel sounds are normal   Incision: healing well, no drainage, no erythema, no seroma, no swelling open wound is very shallow, early granulation beginning  Extremities: peripheral pulses normal, no pedal edema, no clubbing or cyanosis          Henry Stevens MD      This report has been generated by a voice recognition software system  Therefore, there may be syntax, spelling, and/or grammatical errors  Please call if you've any questions

## 2019-03-23 NOTE — ASSESSMENT & PLAN NOTE
· Patient reports worsening cough and fevers over the last several days; denies any nasal congestion/sore throat/myalgias  Did not receive a flu vaccine this year  · Met sepsis criteria at time of admission as above  · Continue IV vancomycin alone  · Infectious Diseases following with antibiotic management  · Procalcitonin was negative at 0 17 on admission is currently now increased to 0 39  However patient has no overt clinical evidence of bacterial pneumonia as fevers have improved and patient denies any respiratory complaints  · Will follow up procalcitonin in the lesly Bahena Kappa · Urine antigens for strep pneumo and Legionella both negative  Influenza A and B and RSV PCR negative

## 2019-03-23 NOTE — ASSESSMENT & PLAN NOTE
Patient's sepsis on presentation likely due to left breast abscess  CT chest with contrast showed right lower lobe pneumonia with 3 6 x 1 7 cm lesion of the left breast in close proximity to the dermis  Possibly an abscess  Ultrasound of left breast showed organizing heterogenous collection containing a large amount of debris in the area of palpable clinical concern consistent with an abscess  Patient has been maintained on IV vancomycin  Infectious Diseases following patient and managing antibiotics  Patient has no overt clinical evidence of bacterial pneumonia as he is afebrile, without hypoxia, and not tachycardic  His procalcitonin was initially negative at 0 17 and elevated to 0 39 on 03/22  Procalcitonin for 3/23 pending  Blood cultures show negative growth to date  Influenza A and B and RSV PCR negative, and urine for Legionella antigen and strep pneumo both negative  Wound culture shows gram-positive cocci in pairs, awaiting wound culture sensitivities

## 2019-03-23 NOTE — PROGRESS NOTES
Vancomycin Assessment    Antwan Salvador is a 33 y o  male who is currently receiving vancomycin 2000 mg IV q6h for skin-soft tissue infection/sepsis with breast abcess   Relevant clinical data and objective history reviewed:  Creatinine   Date Value Ref Range Status   03/23/2019 0 74 0 60 - 1 30 mg/dL Final     Comment:     Standardized to IDMS reference method   03/22/2019 0 76 0 60 - 1 30 mg/dL Final     Comment:     Standardized to IDMS reference method   03/21/2019 0 87 0 60 - 1 30 mg/dL Final     Comment:     Standardized to IDMS reference method     /56 (BP Location: Right arm)   Pulse 80   Temp 98 3 °F (36 8 °C) (Temporal)   Resp 18   Ht 5' 10 98" (1 803 m)   Wt (!) 137 kg (300 lb 14 9 oz)   SpO2 92%   BMI 41 99 kg/m²   I/O last 3 completed shifts: In: 3042 [I V :2255]  Out: -   Lab Results   Component Value Date/Time    BUN 10 03/23/2019 05:07 AM    WBC 4 54 03/23/2019 05:07 AM    HGB 12 9 03/23/2019 05:07 AM    HCT 39 3 03/23/2019 05:07 AM    MCV 88 03/23/2019 05:07 AM     03/23/2019 05:07 AM     Temp Readings from Last 3 Encounters:   03/23/19 98 3 °F (36 8 °C) (Temporal)   03/18/19 99 °F (37 2 °C) (Oral)   05/24/16 98 4 °F (36 9 °C) (Oral)     Vancomycin Days of Therapy: 4    Assessment/Plan  The patient is currently on vancomycin utilizing scheduled dosing  The patient is receiving 2000 mg IV q6h with the most recent vancomycin level being 15 9 and therapeutic based on a goal of 15-20 (appropriate for most indications); therefore, this dose is clinically appropriate and dose will be continued   Pharmacy will continue to follow closely for s/sx of nephrotoxicity, infusion reactions and appropriateness of therapy  Will plan for another level on 3/25/19 prior to the 1100 dose, since latest level was somewhat early due to q6h frequency  BMP and CBC will be ordered per protocol  Pharmacy will continue to follow the patient?s culture results and clinical progress daily      Lonza Lunch Sandhya Houston, Pharmacist

## 2019-03-23 NOTE — PROGRESS NOTES
Pt wound care done  Redness appears to be past the line drawn around abscess  1 strip of packing inserted  Pt tolerated fairly well  Will continue to monitor

## 2019-03-24 VITALS
DIASTOLIC BLOOD PRESSURE: 80 MMHG | OXYGEN SATURATION: 96 % | SYSTOLIC BLOOD PRESSURE: 138 MMHG | WEIGHT: 300.93 LBS | RESPIRATION RATE: 18 BRPM | TEMPERATURE: 97.9 F | HEIGHT: 71 IN | BODY MASS INDEX: 42.13 KG/M2 | HEART RATE: 79 BPM

## 2019-03-24 PROBLEM — R93.89 ABNORMAL CT OF THE CHEST: Status: ACTIVE | Noted: 2019-03-24

## 2019-03-24 LAB
BACTERIA WND AEROBE CULT: ABNORMAL
GRAM STN SPEC: ABNORMAL
GRAM STN SPEC: ABNORMAL

## 2019-03-24 PROCEDURE — 99238 HOSP IP/OBS DSCHRG MGMT 30/<: CPT | Performed by: FAMILY MEDICINE

## 2019-03-24 PROCEDURE — 99232 SBSQ HOSP IP/OBS MODERATE 35: CPT | Performed by: FAMILY MEDICINE

## 2019-03-24 PROCEDURE — 99232 SBSQ HOSP IP/OBS MODERATE 35: CPT | Performed by: INTERNAL MEDICINE

## 2019-03-24 RX ORDER — DOXYCYCLINE HYCLATE 100 MG/1
100 CAPSULE ORAL EVERY 12 HOURS SCHEDULED
Status: DISCONTINUED | OUTPATIENT
Start: 2019-03-24 | End: 2019-03-24 | Stop reason: HOSPADM

## 2019-03-24 RX ORDER — OXYCODONE HYDROCHLORIDE AND ACETAMINOPHEN 5; 325 MG/1; MG/1
1 TABLET ORAL EVERY 4 HOURS PRN
Status: DISCONTINUED | OUTPATIENT
Start: 2019-03-24 | End: 2019-03-24 | Stop reason: HOSPADM

## 2019-03-24 RX ORDER — DOXYCYCLINE HYCLATE 100 MG/1
100 CAPSULE ORAL EVERY 12 HOURS SCHEDULED
Qty: 14 CAPSULE | Refills: 0 | Status: SHIPPED | OUTPATIENT
Start: 2019-03-24 | End: 2019-03-31

## 2019-03-24 RX ORDER — CEPHALEXIN 500 MG/1
500 CAPSULE ORAL EVERY 6 HOURS SCHEDULED
Status: DISCONTINUED | OUTPATIENT
Start: 2019-03-24 | End: 2019-03-24 | Stop reason: HOSPADM

## 2019-03-24 RX ORDER — CEPHALEXIN 500 MG/1
500 CAPSULE ORAL EVERY 6 HOURS SCHEDULED
Qty: 28 CAPSULE | Refills: 0 | Status: SHIPPED | OUTPATIENT
Start: 2019-03-24 | End: 2019-03-31

## 2019-03-24 RX ORDER — NICOTINE 21 MG/24HR
1 PATCH, TRANSDERMAL 24 HOURS TRANSDERMAL DAILY
Qty: 28 PATCH | Refills: 0 | Status: SHIPPED | OUTPATIENT
Start: 2019-03-25 | End: 2021-06-25

## 2019-03-24 RX ORDER — IBUPROFEN 600 MG/1
600 TABLET ORAL EVERY 6 HOURS PRN
Status: DISCONTINUED | OUTPATIENT
Start: 2019-03-24 | End: 2019-03-24 | Stop reason: HOSPADM

## 2019-03-24 RX ORDER — IBUPROFEN 600 MG/1
600 TABLET ORAL EVERY 6 HOURS PRN
Qty: 30 TABLET | Refills: 0 | Status: SHIPPED | OUTPATIENT
Start: 2019-03-24 | End: 2021-06-25

## 2019-03-24 RX ORDER — OXYCODONE HYDROCHLORIDE AND ACETAMINOPHEN 5; 325 MG/1; MG/1
1 TABLET ORAL EVERY 4 HOURS PRN
Qty: 20 TABLET | Refills: 0 | Status: SHIPPED | OUTPATIENT
Start: 2019-03-24 | End: 2019-04-03

## 2019-03-24 RX ADMIN — ENOXAPARIN SODIUM 40 MG: 40 INJECTION SUBCUTANEOUS at 07:51

## 2019-03-24 RX ADMIN — CEPHALEXIN 500 MG: 500 CAPSULE ORAL at 16:19

## 2019-03-24 RX ADMIN — DOXYCYCLINE HYCLATE 100 MG: 100 CAPSULE ORAL at 16:19

## 2019-03-24 RX ADMIN — VANCOMYCIN HYDROCHLORIDE 2000 MG: 1 INJECTION, POWDER, LYOPHILIZED, FOR SOLUTION INTRAVENOUS at 05:23

## 2019-03-24 RX ADMIN — NICOTINE 1 PATCH: 14 PATCH, EXTENDED RELEASE TRANSDERMAL at 07:50

## 2019-03-24 RX ADMIN — VANCOMYCIN HYDROCHLORIDE 2000 MG: 1 INJECTION, POWDER, LYOPHILIZED, FOR SOLUTION INTRAVENOUS at 11:36

## 2019-03-24 NOTE — ASSESSMENT & PLAN NOTE
· Patient reports worsening cough and fevers over the last several days; denies any nasal congestion/sore throat/myalgias  Did not receive a flu vaccine this year  · Met sepsis criteria at time of admission  · CT chest on admission showed right lower lobe pneumonia  · However do not think this is the cause of patient's sepsis as we have a clear and likely explanation for his sepsis which is the left breast abscess as procalcitonin was negative on admission at 0 17, even though it crept up to 0 39-->0 31, patient displays no overt clinical evidence of bacterial pneumonia  He is afebrile, denies respiratory complaints, no leukocytosis, urine for Legionella and strep pneumo was negative as well as influenza A and B and RSV PCR  · Patient has been on IV vancomycin and will be transitioned to oral Keflex and doxy upon discharge today

## 2019-03-24 NOTE — ASSESSMENT & PLAN NOTE
· Patient reports worsening cough and fevers over the last several days; denies any nasal congestion/sore throat/myalgias  Did not receive a flu vaccine this year  · Met sepsis criteria at time of admission as above  · Continue IV vancomycin alone  · Infectious Diseases following with antibiotic management  · Procalcitonin was negative at 0 17 on admission is currently now increased to 0 39  However patient has no overt clinical evidence of bacterial pneumonia as fevers have improved and patient denies any respiratory complaints  · Will follow up procalcitonin in the lesly [place] · Urine antigens for strep pneumo and Legionella both negative  Influenza A and B and RSV PCR negative

## 2019-03-24 NOTE — ASSESSMENT & PLAN NOTE
· Patient reports worsening cough and fevers over the last several days; denies any nasal congestion/sore throat/myalgias  Did not receive a flu vaccine this year  · Met sepsis criteria at time of admission  · CT chest on admission showed right lower lobe pneumonia  · However do not think this is the cause of patient's sepsis as we have a clear and likely explanation for his sepsis which is the left breast abscess as procalcitonin was negative on admission at 0 17, even though it crept up to 0 39-->0 31, patient displays no overt clinical evidence of bacterial pneumonia  He is afebrile, denies respiratory complaints, no leukocytosis, urine for Legionella and strep pneumo was negative as well as influenza A and B and RSV PCR  · So will continue on IV vancomycin only for now

## 2019-03-24 NOTE — DISCHARGE SUMMARY
Discharge- Antwan Lugo 1990, 33 y o  male MRN: [id number]    Unit/Bed#: E5 -01 Encounter: [id number]    Primary Care Provider: Eda Mckeon DO   Date and time admitted to hospital: 3/20/2019  6:33 PM        * Sepsis [facility])  Assessment & Plan  Patient's sepsis on presentation likely due to left breast abscess  CT chest with contrast showed right lower lobe pneumonia with 3 6 x 1 7 cm lesion of the left breast in close proximity to the dermis  Possibly an abscess  Ultrasound of left breast showed organizing heterogenous collection containing a large amount of debris in the area of palpable clinical concern consistent with an abscess  Patient has been maintained on IV vancomycin  Infectious Diseases following patient and managing antibiotics  Antibiotic switched to oral Keflex and doxycycline  Infectious Disease has cleared patient for discharge with oral antibiotics  Patient has no overt clinical evidence of bacterial pneumonia as he is afebrile, without hypoxia, and not tachycardic  His procalcitonin was initially negative at 0 17 and elevated to 0 39 on 03/22  Procalcitonin for 3/23-0 31  Blood cultures show negative growth to date  Influenza A and B and RSV PCR negative, and urine for Legionella antigen and strep pneumo both negative  Wound culture shows gram-positive cocci in pairs, awaiting wound culture sensitivities  Patient currently hemodynamically stable for discharge to home today  Abscess of left breast  Assessment & Plan  · Was recently evaluated in the ED on Monday - ultrasound at bedside did not show any focal abscess  Discharged home on oral keflex and bactrim which patient reports compliance  Cellulitic area was outlined to monitor progression  · Patient states the redness is slightly worse; painful to the touch  No drainage noted, no induration  · CT chest: "3 6 x 1 7 cm lesion of the left breast in close proximity to the dermis    It cannot be determined on the basis of this CT if this represents a dermal lesion, phlegmon or less likely abscess  Given proximity to skin surface, ultrasound would be the preferred modality for evaluation "  · Ultrasound of left breast shows abscess formation  · Patient was seen and evaluated by General surgery and bedside incision and drainage was performed 3/21  · IV vancomycin discontinued, and patient transition to oral Keflex and doxycycline to complete a total of 10 days of therapy  · Patient will be on antibiotics for 7 more days at home  · Wound cultures showing gram-positive cocci in pairs, blood cultures negative growth to date  · Patient cleared by General surgery for discharge with daily wet to dry dressings with 2 x 2 gauze before and after shower  Abnormal CT of the chest  Assessment & Plan  · Patient reports worsening cough and fevers over the last several days; denies any nasal congestion/sore throat/myalgias  Did not receive a flu vaccine this year  · Met sepsis criteria at time of admission  · CT chest on admission showed right lower lobe pneumonia  · However do not think this is the cause of patient's sepsis as we have a clear and likely explanation for his sepsis which is the left breast abscess as procalcitonin was negative on admission at 0 17, even though it crept up to 0 39-->0 31, patient displays no overt clinical evidence of bacterial pneumonia  He is afebrile, denies respiratory complaints, no leukocytosis, urine for Legionella and strep pneumo was negative as well as influenza A and B and RSV PCR  · Patient has been on IV vancomycin and will be transitioned to oral Keflex and doxy upon discharge today        Morbid obesity with BMI of 40 0-44 9, adult [facility])  Assessment & Plan  · Counseled patient on lifestyle modifications including diet and exercise  · May benefit from outpatient bariatric referral          Discharging Physician / Practitioner: Silas Giraldo MD  PCP: Sowmya Varela, DO  Admission Date:   Admission Orders (From admission, onward)    Ordered        03/20/19 2047  Inpatient Admission  Once     Order ID Start Status   [id number] 03/20/19 2048 Completed              Discharge Date: 03/24/19    Resolved Problems  Date Reviewed: 3/24/2019    None          Consultations During Hospital Stay:  · Infectious Diseases, general surgery  Procedures Performed:   · CT chest with contrast 03/20/2019, left breast ultrasound 03/21/2019, 12 lead EKG 03/20/2019, bedside incision and drainage 03/21/2019  Significant Findings / Test Results:   · CT chest with contrast 3/20-right lower lobe pneumonia  3 6 x 1 7 cm lesion of the left breast in close proximity to the dermis  A cannot be determined on the basis of this CT if this represents a dermal lesion, phlegmon, or less likely abscess  Given proximity to skin surface, ultrasound would be the preferred modality for evaluation  · Left breast ultrasound 3/21-targeted ultrasound of the superficial soft tissues of the left breast demonstrates an organized heterogenous collection containing a significant amount of debris measuring 3 x 1 6 x 4 6 cm  There is no abnormal vascularity within the collection  These findings are consistent with an abscess  · Twelve lead EKG 3/20-normal sinus rhythm  Incidental Findings:   · None     Test Results Pending at Discharge (will require follow up): · Wound culture sensitivities  Outpatient Tests Requested:  · None    Complications:  None    Reason for Admission:  Sepsis with left breast abscess  Hospital Course:     Amy James is a 33 y o  male patient who originally presented to the hospital on 3/20/2019 due to worsening redness and tenderness of his left breast with fluctuating subjective fevers at home    The patient was initially seen in the ED at [facility] 3 days early and diagnosed with cellulitis of the left breast   He was discharged home with a script for both Keflex and Bactrim which the patient states that he was compliant with  However his symptoms worsened and he decided to come for evaluation  During evaluation in the ED at [facility] he was found to have sepsis with a left breast abscess as well as CT evidence of right lower lobe pneumonia  His procalcitonin on admission was negative however he was started on IV vancomycin and given a dose of cefepime prior to his transfer to the medical-surgical unit  During the rest of his hospitalization was treated solely with IV vancomycin and after evaluation by Infectious Diseases did not think that patient needed coverage for pneumonia as they thought that the cause of patient's sepsis was due to the left breast abscess and not the right lower lobe pneumonia  The reasoning for this was that the patient was afebrile as his fevers had trended down during his hospitalization, he had no respiratory complaints, his white count had normalized, and his urine for Legionella antigen and strep pneumo was negative as well as his influenza A and B and RSV PCR  The patient's procalcitonin was mildly elevated yesterday at 0 39 but today's currently trending down words to 0 31  He was seen and evaluated by General surgery during his admission and they performed a bedside incision and drainage  The patient tolerated procedure well  Wound cultures were sent,the results of which are currently pending  The patient has been improvement throughout his hospitalization and he has remained afebrile without leukocytosis or any other respiratory complaints  He was transitioned from IV vancomycin to oral Keflex and doxycycline to complete a total of 10 days of therapy  He will take antibiotics for 7 more days after his discharge  The patient is currently hemodynamically stable for discharge to home today  He has been advised by General surgery to perform wet to dry dressing changes daily before and after showers        Please see above list of diagnoses and related plan for additional information  Condition at Discharge: good     Discharge Day Visit / Exam:     Subjective:  Patient has no complaints at this time  Vitals: Blood Pressure: 138/80 (03/24/19 0700)  Pulse: 79 (03/24/19 0700)  Temperature: 97 9 °F (36 6 °C) (03/24/19 0700)  Temp Source: Temporal (03/24/19 0700)  Respirations: 18 (03/24/19 0700)  Height: 5' 10 98" (180 3 cm) (03/21/19 1354)  Weight - Scale: (!) 137 kg (300 lb 14 9 oz) (03/20/19 1833)  SpO2: 96 % (03/24/19 0700)  Exam:   Physical Exam   Constitutional: He is oriented to person, place, and time  He appears well-developed and well-nourished  No distress  HENT:   Head: Normocephalic and atraumatic  Eyes: Pupils are equal, round, and reactive to light  EOM are normal    Neck: Normal range of motion  Neck supple  Cardiovascular: Normal rate, regular rhythm and normal heart sounds  Pulmonary/Chest: Effort normal and breath sounds normal    Abdominal: Soft  Bowel sounds are normal    Musculoskeletal: Normal range of motion  Neurological: He is alert and oriented to person, place, and time  Skin: Skin is warm and dry  He is not diaphoretic  Abscess cavity shows good wound healing with no evidence of pus or drainage  Discussion with Family:  No    Discharge instructions/Information to patient and family:   See after visit summary for information provided to patient and family  Provisions for Follow-Up Care:  See after visit summary for information related to follow-up care and any pertinent home health orders  Disposition:     Home    For Discharges to [facility]:   · Not Applicable to this Patient - Not Applicable to this Patient    Planned Readmission:  None     Discharge Statement:  I spent 20 minutes discharging the patient  This time was spent on the day of discharge  I had direct contact with the patient on the day of discharge   Greater than 50% of the total time was spent examining patient, answering all patient questions, arranging and discussing plan of care with patient as well as directly providing post-discharge instructions  Additional time then spent on discharge activities  Discharge Medications:  See after visit summary for reconciled discharge medications provided to patient and family        ** Please Note: This note has been constructed using a voice recognition system **

## 2019-03-24 NOTE — NURSING NOTE
Reviewed AVS, discharge instructions and prescriptions with pt  Provided with dressing change supplies  No questions at this time

## 2019-03-24 NOTE — ASSESSMENT & PLAN NOTE
Patient's sepsis on presentation likely due to left breast abscess  CT chest with contrast showed right lower lobe pneumonia with 3 6 x 1 7 cm lesion of the left breast in close proximity to the dermis  Possibly an abscess  Ultrasound of left breast showed organizing heterogenous collection containing a large amount of debris in the area of palpable clinical concern consistent with an abscess  Patient has been maintained on IV vancomycin  Infectious Diseases following patient and managing antibiotics  Antibiotic switched to oral Keflex and doxycycline  Infectious Disease has cleared patient for discharge with oral antibiotics  Patient has no overt clinical evidence of bacterial pneumonia as he is afebrile, without hypoxia, and not tachycardic  His procalcitonin was initially negative at 0 17 and elevated to 0 39 on 03/22  Procalcitonin for 3/23-0 31  Blood cultures show negative growth to date  Influenza A and B and RSV PCR negative, and urine for Legionella antigen and strep pneumo both negative  Wound culture shows gram-positive cocci in pairs, awaiting wound culture sensitivities  Patient currently hemodynamically stable for discharge to home today

## 2019-03-24 NOTE — PROGRESS NOTES
Progress Note - Infectious Disease   Antwan Lugo 33 y o  male MRN: [id number]  Unit/Bed#: E5 -01 Encounter: [id number]      Impression/Recommendations:  1  Sepsis, POA   Fever, tachycardia  Regina Gondola all related to left breast infection   Low clinical suspicion that patient also has bacterial pneumonia despite CT chest that indicated possible right lower lobe pneumonia   Patient with no significant symptoms to suggest bacterial pneumonia   No hypoxia   Procalcitonin level was negative on admission but increased mildly, now decreasing again   Fevers have improved after drainage of abscess   Patient still does not have any significant respiratory complaints   Admission blood cultures remain negative      -antibiotic plan as below  -follow up pending blood cultures  -monitor temperatures and hemodynamics  -supportive care     2  Left breast cellulitis with abscess   Now status post bedside I and D  Likely refractory to oral Keflex/Bactrim treatment due to development of undrained abscess   High suspicion for Staph infection, possibly MRSA  Unfortunately, wound culture was not helpful  Cellulitis much improved     -change abx to po Keflex/doxy  -wound care per surgery  -treat x 10 days postop, another 7 days     3  Abnormal CT chest   CT chest suggested possible right lower lobe pneumonia   Doubt this is the cause of #1 as we have another clear explanation   No overt clinical evidence to suggest bacterial pneumonia   Procalcitonin level was negative   Repeat procalcitonin is 0 39   Fevers have improved   Patient has no respiratory complaints   Repeat procalcitonin level improving   Monitor symptoms and temperatures for now      4  Morbid obesity   Likely risk factor for development of severe breast infection   Recommend weight loss, dietary changes      Discussed with patient in detail regarding the above plan  D/w with Dr Faisal Saldana from [facility] for discharge from ID viewpoint      Antibiotics:  Antibiotic 5  Vancomycin 4  Post-drainage day 3     Subjective:  Patient feels well  Left chest wall pain continues to improve  No respiratory symptoms  Temperature stays down  No chills  He is tolerating antibiotic well  No nausea, vomiting or diarrhea  Objective:  Vitals:  Temp:  [97 9 °F (36 6 °C)-98 6 °F (37 °C)] 97 9 °F (36 6 °C)  HR:  [77-80] 79  Resp:  [18] 18  BP: (131-145)/(56-80) 138/80  SpO2:  [92 %-96 %] 96 %  Temp (24hrs), Av 3 °F (36 8 °C), Min:97 9 °F (36 6 °C), Max:98 6 °F (37 °C)  Current: Temperature: 97 9 °F (36 6 °C)    Physical Exam:     General: Awake, alert, cooperative, no distress  Neck:  Supple  No mass  No lymphadenopathy  Chest:  Incision clean  No purulence  Improved erythema  Minimal tenderness  Lungs: Expansion symmetric, no rales, no wheezing, respirations unlabored  Heart:  Regular rate and rhythm, S1 and S2 normal, no murmur  Abdomen: Soft, nondistended, non-tender, bowel sounds active all four quadrants,        no masses, no organomegaly  Extremities: No edema  No erythema/warmth  No ulcer  Nontender to palpation  Skin:  No rash  Neuro: Moves all extremities  Invasive Devices     Peripheral Intravenous Line            Peripheral IV 19 Right Forearm 1 day                Labs studies:   I have personally reviewed pertinent labs    Results from last 7 days   Lab Units 19  0507 19  0557 19  0437 19  1850 19  1639   POTASSIUM mmol/L 3 8 3 4* 3 6 3 7 3 9   CHLORIDE mmol/L 104 99* 99* 98* 101   CO2 mmol/L 26 23 20* 22 25   BUN mg/dL 10 8 8 9 9   CREATININE mg/dL 0 74 0 76 0 87 1 03 0 96   EGFR ml/min/1 73sq m 125 123 117 98 106   CALCIUM mg/dL 8 0* 8 1* 8 1* 8 5 9 1   AST U/L  --   --  64* 65* 45   ALT U/L  --   --  53 56 74   ALK PHOS U/L  --   --  52 61 80     Results from last 7 days   Lab Units 19  0507 19  0557 19  0437   WBC Thousand/uL 4 54 4 71 5 36   HEMOGLOBIN g/dL 12 9 13 2 12 7   PLATELETS Thousands/uL 182 170 148*     Results from last 7 days   Lab Units 03/22/19  0413 03/21/19  1459 03/21/19  0434 03/20/19  2247 03/20/19  1909 03/20/19  1908 03/20/19  1850   BLOOD CULTURE   --   --   --   --  No Growth at 72 hrs   --  No Growth at 72 hrs  SPUTUM CULTURE   --   --  2+ Growth of Candida sp  presumptively albicans*  2+ Growth of   Commensal respiratory anneliese only; No significant growth of Staph aureus/MRSA or Pseudomonas aeruginosa  --   --   --   --    GRAM STAIN RESULT   --  1+ Polys*  2+ Gram positive cocci in pairs* 1+ Epithelial cells per low power field*  2+ Polys*  2+ Gram positive rods*  1+ Yeast*  --   --   --   --    WOUND CULTURE   --  Growth in Broth culture only Staphylococcus coagulase negative*  --   --   --   --   --    INFLUENZA B PCR   --   --   --   --   --  Not Detected  --    RSV PCR   --   --   --   --   --  Not Detected  --    LEGIONELLA URINARY ANTIGEN   --   --   --  Negative  --   --   --    C DIFF TOXIN B  NEGATIVE for C difficle toxin by PCR    --   --   --   --   --   --        Imaging Studies:   I have personally reviewed pertinent imaging study reports and images in PACS  EKG, Pathology, and Other Studies:   I have personally reviewed pertinent reports

## 2019-03-24 NOTE — PROGRESS NOTES
Progress Note - Antwan Lugo 1990, 33 y o  male MRN: [id number]    Unit/Bed#: E5 -01 Encounter: [id number]    Primary Care Provider: Juan Antonio Salazar DO   Date and time admitted to hospital: 3/20/2019  6:33 PM        * Sepsis [facility])  Assessment & Plan  Patient's sepsis on presentation likely due to left breast abscess  CT chest with contrast showed right lower lobe pneumonia with 3 6 x 1 7 cm lesion of the left breast in close proximity to the dermis  Possibly an abscess  Ultrasound of left breast showed organizing heterogenous collection containing a large amount of debris in the area of palpable clinical concern consistent with an abscess  Patient has been maintained on IV vancomycin  Infectious Diseases following patient and managing antibiotics  Patient has no overt clinical evidence of bacterial pneumonia as he is afebrile, without hypoxia, and not tachycardic  His procalcitonin was initially negative at 0 17 and elevated to 0 39 on 03/22  Procalcitonin for 3/23-0 31  Blood cultures show negative growth to date  Influenza A and B and RSV PCR negative, and urine for Legionella antigen and strep pneumo both negative  Wound culture shows gram-positive cocci in pairs, awaiting wound culture sensitivities  Abscess of left breast  Assessment & Plan  · Was recently evaluated in the ED on Monday - ultrasound at bedside did not show any focal abscess  Discharged home on oral keflex and bactrim which patient reports compliance  Cellulitic area was outlined to monitor progression  · Patient states the redness is slightly worse; painful to the touch  No drainage noted, no induration  · CT chest: "3 6 x 1 7 cm lesion of the left breast in close proximity to the dermis  It cannot be determined on the basis of this CT if this represents a dermal lesion, phlegmon or less likely abscess    Given proximity to skin surface, ultrasound would be the preferred modality for evaluation "  · Ultrasound of left breast shows abscess formation  · Patient was seen and evaluated by General surgery and bedside incision and drainage was performed 3/21  · Continue IV vancomycin as recommended by Infectious Diseases  · Wound cultures showing gram-positive cocci in pairs, blood cultures negative growth to date  · Patient cleared by General surgery for discharge with daily wet to dry dressings with 2 x 2 gauze before and after shower  Abnormal CT of the chest  Assessment & Plan  · Patient reports worsening cough and fevers over the last several days; denies any nasal congestion/sore throat/myalgias  Did not receive a flu vaccine this year  · Met sepsis criteria at time of admission  · CT chest on admission showed right lower lobe pneumonia  · However do not think this is the cause of patient's sepsis as we have a clear and likely explanation for his sepsis which is the left breast abscess as procalcitonin was negative on admission at 0 17, even though it crept up to 0 39-->0 31, patient displays no overt clinical evidence of bacterial pneumonia  He is afebrile, denies respiratory complaints, no leukocytosis, urine for Legionella and strep pneumo was negative as well as influenza A and B and RSV PCR  · So will continue on IV vancomycin only for now  Morbid obesity with BMI of 40 0-44 9, adult [facility])  Assessment & Plan  · Counseled patient on lifestyle modifications including diet and exercise  · May benefit from outpatient bariatric referral        VTE Pharmacologic Prophylaxis:   Pharmacologic: Enoxaparin (Lovenox)  Mechanical VTE Prophylaxis in Place: No    Patient Centered Rounds: I have performed bedside rounds with nursing staff today  Discussions with Specialists or Other Care Team Provider:  Yes    Education and Discussions with Family / Patient:  Yes    Time Spent for Care: 20 minutes  More than 50% of total time spent on counseling and coordination of care as described above      Current Length of Stay: 4 day(s)    Current Patient Status: Inpatient   Certification Statement: The patient will continue to require additional inpatient hospital stay due to Sepsis with left breast abscess requiring IV antibiotics    Discharge Plan: To be determined    Code Status: Level 1 - Full Code      Subjective:   Patient states pain is much better and well controlled  He denies any fevers or chills  Objective:     Vitals:   Temp (24hrs), Av 3 °F (36 8 °C), Min:97 9 °F (36 6 °C), Max:98 6 °F (37 °C)    Temp:  [97 9 °F (36 6 °C)-98 6 °F (37 °C)] 97 9 °F (36 6 °C)  HR:  [77-80] 79  Resp:  [18] 18  BP: (131-145)/(56-80) 138/80  SpO2:  [92 %-96 %] 96 %  Body mass index is 41 99 kg/m²  Input and Output Summary (last 24 hours): Intake/Output Summary (Last 24 hours) at 3/24/2019 1057  Last data filed at 3/24/2019 0700  Gross per 24 hour   Intake 480 ml   Output    Net 480 ml       Physical Exam:     Physical Exam   Constitutional: He is oriented to person, place, and time  He appears well-developed and well-nourished  No distress  Morbidly obese body habitus   HENT:   Head: Normocephalic and atraumatic  Eyes: Pupils are equal, round, and reactive to light  EOM are normal    Neck: Normal range of motion  Neck supple  Cardiovascular: Normal rate, regular rhythm and normal heart sounds  Pulmonary/Chest: Effort normal and breath sounds normal    Abdominal: Soft  Bowel sounds are normal    Musculoskeletal: Normal range of motion  Neurological: He is alert and oriented to person, place, and time  Skin: Skin is warm and dry  He is not diaphoretic  Abscess cavity examined this a m  And noted to have no drainage, no pus noted  Clean margins after incision and drainage noted  Good wound healing also noted       Additional Data:     Labs:    Results from last 7 days   Lab Units 19  0507 19  0557   WBC Thousand/uL 4 54 4 71   HEMOGLOBIN g/dL 12 9 13 2   HEMATOCRIT % 39 3 39 5   PLATELETS Thousands/uL 182 170   BANDS PCT %  --  6   NEUTROS PCT % 54  --    LYMPHS PCT % 34  --    LYMPHO PCT %  --  23   MONOS PCT % 8  --    MONO PCT %  --  1*   EOS PCT % 0 0     Results from last 7 days   Lab Units 03/23/19  0507  03/21/19  0437   SODIUM mmol/L 138   < > 132*   POTASSIUM mmol/L 3 8   < > 3 6   CHLORIDE mmol/L 104   < > 99*   CO2 mmol/L 26   < > 20*   BUN mg/dL 10   < > 8   CREATININE mg/dL 0 74   < > 0 87   ANION GAP mmol/L 8   < > 13   CALCIUM mg/dL 8 0*   < > 8 1*   ALBUMIN g/dL  --   --  2 6*   TOTAL BILIRUBIN mg/dL  --   --  0 47   ALK PHOS U/L  --   --  52   ALT U/L  --   --  53   AST U/L  --   --  64*   GLUCOSE RANDOM mg/dL 91   < > 96    < > = values in this interval not displayed  Results from last 7 days   Lab Units 03/20/19  1850   INR  1 18*             Results from last 7 days   Lab Units 03/23/19  0507 03/22/19  0557 03/20/19  1909 03/20/19  1850   LACTIC ACID mmol/L  --   --  0 9  --    PROCALCITONIN ng/ml 0 31* 0 39*  --  0 17           * I Have Reviewed All Lab Data Listed Above  * Additional Pertinent Lab Tests Reviewed: [address] Admission Reviewed    Imaging:    Imaging Reports Reviewed Today Include:  None available  Imaging Personally Reviewed by Myself Includes:  None    Recent Cultures (last 7 days):     Results from last 7 days   Lab Units 03/22/19  0413 03/21/19  1459 03/21/19  0434 03/20/19  2247 03/20/19  1909 03/20/19  1908 03/20/19  1850   BLOOD CULTURE   --   --   --   --  No Growth at 72 hrs   --  No Growth at 72 hrs  SPUTUM CULTURE   --   --  2+ Growth of Candida sp  presumptively albicans*  2+ Growth of   Commensal respiratory anneliese only; No significant growth of Staph aureus/MRSA or Pseudomonas aeruginosa    --   --   --   --    GRAM STAIN RESULT   --  1+ Polys*  2+ Gram positive cocci in pairs* 1+ Epithelial cells per low power field*  2+ Polys*  2+ Gram positive rods*  1+ Yeast*  --   --   --   --    WOUND CULTURE   --  Growth in Broth culture only Staphylococcus coagulase negative*  --   --   --   --   --    INFLUENZA B PCR   --   --   --   --   --  Not Detected  --    RSV PCR   --   --   --   --   --  Not Detected  --    LEGIONELLA URINARY ANTIGEN   --   --   --  Negative  --   --   --    C DIFF TOXIN B  NEGATIVE for C difficle toxin by PCR    --   --   --   --   --   --        Last 24 Hours Medication List:     Current Facility-Administered Medications:  acetaminophen 650 mg Oral Q6H PRN SUHAS Gage-JAE    benzonatate 100 mg Oral TID PRN Jus Gonsalves PA-C    diphenhydrAMINE 25 mg Oral HS PRN Sherie Olivares PA-C    enoxaparin 40 mg Subcutaneous Daily Von Gee PA-C    nicotine 1 patch Transdermal Daily Von Gee PA-C    ondansetron 4 mg Intravenous Q6H PRN Von Hillman PA-C    vancomycin 2,000 mg Intravenous Q6H Suzette Abbasi DO Last Rate: 2,000 mg (03/24/19 0531)        Today, Patient Was Seen By: Mely Hawthorne MD    ** Please Note: Dictation voice to text software may have been used in the creation of this document   **

## 2019-03-24 NOTE — ASSESSMENT & PLAN NOTE
· Was recently evaluated in the ED on Monday - ultrasound at bedside did not show any focal abscess  Discharged home on oral keflex and bactrim which patient reports compliance  Cellulitic area was outlined to monitor progression  · Patient states the redness is slightly worse; painful to the touch  No drainage noted, no induration  · CT chest: "3 6 x 1 7 cm lesion of the left breast in close proximity to the dermis  It cannot be determined on the basis of this CT if this represents a dermal lesion, phlegmon or less likely abscess  Given proximity to skin surface, ultrasound would be the preferred modality for evaluation "  · Ultrasound of left breast shows abscess formation  · Patient was seen and evaluated by General surgery and bedside incision and drainage was performed 3/21  · IV vancomycin discontinued, and patient transition to oral Keflex and doxycycline to complete a total of 10 days of therapy  · Patient will be on antibiotics for 7 more days at home  · Wound cultures showing gram-positive cocci in pairs, blood cultures negative growth to date  · Patient cleared by General surgery for discharge with daily wet to dry dressings with 2 x 2 gauze before and after shower

## 2019-03-24 NOTE — ASSESSMENT & PLAN NOTE
Patient's sepsis on presentation likely due to left breast abscess  CT chest with contrast showed right lower lobe pneumonia with 3 6 x 1 7 cm lesion of the left breast in close proximity to the dermis  Possibly an abscess  Ultrasound of left breast showed organizing heterogenous collection containing a large amount of debris in the area of palpable clinical concern consistent with an abscess  Patient has been maintained on IV vancomycin  Infectious Diseases following patient and managing antibiotics  Patient has no overt clinical evidence of bacterial pneumonia as he is afebrile, without hypoxia, and not tachycardic  His procalcitonin was initially negative at 0 17 and elevated to 0 39 on 03/22  Procalcitonin for 3/23-0 31  Blood cultures show negative growth to date  Influenza A and B and RSV PCR negative, and urine for Legionella antigen and strep pneumo both negative  Wound culture shows gram-positive cocci in pairs, awaiting wound culture sensitivities

## 2019-03-24 NOTE — PROGRESS NOTES
Vancomycin IV Pharmacy-to-Dose Consultation    Santo Pelaez is a 33 y o  male who is currently receiving Vancomycin IV with management by the Pharmacy Consult service  Assessment/Plan:  The patient was reviewed  Renal function is stable and no signs or symptoms of nephrotoxicity and/or infusion reactions were documented in the chart  Wound culture shows Staph growth, however susceptibilities are still pending at this time  Based on today?s assessment, continue current vancomycin (day # 5) dosing of 2 g IV q 6h, with a plan for trough to be drawn at 1100 on 3/25/19  We will continue to follow the patient?s culture results and clinical progress daily      Elsa Langford, Pharmacist

## 2019-03-25 LAB
BACTERIA BLD CULT: NORMAL
BACTERIA BLD CULT: NORMAL

## 2019-04-01 ENCOUNTER — OFFICE VISIT (OUTPATIENT)
Dept: SURGERY | Facility: CLINIC | Age: 29
End: 2019-04-01

## 2019-04-01 DIAGNOSIS — N61.1 ABSCESS OF LEFT BREAST: Primary | ICD-10-CM

## 2019-04-01 PROCEDURE — 99213 OFFICE O/P EST LOW 20 MIN: CPT | Performed by: SURGERY

## 2020-08-27 ENCOUNTER — HOSPITAL ENCOUNTER (EMERGENCY)
Facility: HOSPITAL | Age: 30
Discharge: HOME | End: 2020-08-27
Attending: EMERGENCY MEDICINE | Admitting: EMERGENCY MEDICINE
Payer: COMMERCIAL

## 2020-08-27 ENCOUNTER — APPOINTMENT (EMERGENCY)
Dept: CT IMAGING | Facility: HOSPITAL | Age: 30
End: 2020-08-27
Payer: COMMERCIAL

## 2020-08-27 VITALS
HEART RATE: 72 BPM | BODY MASS INDEX: 42.88 KG/M2 | OXYGEN SATURATION: 97 % | RESPIRATION RATE: 18 BRPM | WEIGHT: 307.32 LBS | TEMPERATURE: 98.3 F | SYSTOLIC BLOOD PRESSURE: 132 MMHG | DIASTOLIC BLOOD PRESSURE: 78 MMHG

## 2020-08-27 DIAGNOSIS — R10.30 LOWER ABDOMINAL PAIN: Primary | ICD-10-CM

## 2020-08-27 DIAGNOSIS — K63.9 COLON WALL THICKENING: ICD-10-CM

## 2020-08-27 DIAGNOSIS — R79.89 ELEVATED LFTS: ICD-10-CM

## 2020-08-27 DIAGNOSIS — K57.32 DIVERTICULITIS LARGE INTESTINE: ICD-10-CM

## 2020-08-27 LAB
ALBUMIN SERPL BCP-MCNC: 3.5 G/DL (ref 3.5–5)
ALP SERPL-CCNC: 84 U/L (ref 46–116)
ALT SERPL W P-5'-P-CCNC: 98 U/L (ref 12–78)
ANION GAP SERPL CALCULATED.3IONS-SCNC: 6 MMOL/L (ref 4–13)
APTT PPP: 28 SECONDS (ref 23–37)
AST SERPL W P-5'-P-CCNC: 58 U/L (ref 5–45)
BASOPHILS # BLD AUTO: 0.04 THOUSANDS/ΜL (ref 0–0.1)
BASOPHILS NFR BLD AUTO: 1 % (ref 0–1)
BILIRUB SERPL-MCNC: 0.7 MG/DL (ref 0.2–1)
BILIRUB UR QL STRIP: NEGATIVE
BUN SERPL-MCNC: 11 MG/DL (ref 5–25)
CALCIUM SERPL-MCNC: 8.6 MG/DL (ref 8.3–10.1)
CHLORIDE SERPL-SCNC: 103 MMOL/L (ref 100–108)
CLARITY UR: ABNORMAL
CO2 SERPL-SCNC: 28 MMOL/L (ref 21–32)
COLOR UR: YELLOW
CREAT SERPL-MCNC: 0.88 MG/DL (ref 0.6–1.3)
EOSINOPHIL # BLD AUTO: 0.14 THOUSAND/ΜL (ref 0–0.61)
EOSINOPHIL NFR BLD AUTO: 2 % (ref 0–6)
ERYTHROCYTE [DISTWIDTH] IN BLOOD BY AUTOMATED COUNT: 12.5 % (ref 11.6–15.1)
GFR SERPL CREATININE-BSD FRML MDRD: 114 ML/MIN/1.73SQ M
GLUCOSE SERPL-MCNC: 91 MG/DL (ref 65–140)
GLUCOSE UR STRIP-MCNC: NEGATIVE MG/DL
HCT VFR BLD AUTO: 46.1 % (ref 36.5–49.3)
HGB BLD-MCNC: 15.4 G/DL (ref 12–17)
HGB UR QL STRIP.AUTO: NEGATIVE
IMM GRANULOCYTES # BLD AUTO: 0.05 THOUSAND/UL (ref 0–0.2)
IMM GRANULOCYTES NFR BLD AUTO: 1 % (ref 0–2)
INR PPP: 1.12 (ref 0.84–1.19)
KETONES UR STRIP-MCNC: NEGATIVE MG/DL
LEUKOCYTE ESTERASE UR QL STRIP: NEGATIVE
LIPASE SERPL-CCNC: 70 U/L (ref 73–393)
LYMPHOCYTES # BLD AUTO: 1.85 THOUSANDS/ΜL (ref 0.6–4.47)
LYMPHOCYTES NFR BLD AUTO: 24 % (ref 14–44)
MCH RBC QN AUTO: 29.4 PG (ref 26.8–34.3)
MCHC RBC AUTO-ENTMCNC: 33.4 G/DL (ref 31.4–37.4)
MCV RBC AUTO: 88 FL (ref 82–98)
MONOCYTES # BLD AUTO: 0.68 THOUSAND/ΜL (ref 0.17–1.22)
MONOCYTES NFR BLD AUTO: 9 % (ref 4–12)
NEUTROPHILS # BLD AUTO: 5.01 THOUSANDS/ΜL (ref 1.85–7.62)
NEUTS SEG NFR BLD AUTO: 63 % (ref 43–75)
NITRITE UR QL STRIP: NEGATIVE
NRBC BLD AUTO-RTO: 0 /100 WBCS
PH UR STRIP.AUTO: 8.5 [PH]
PLATELET # BLD AUTO: 266 THOUSANDS/UL (ref 149–390)
PMV BLD AUTO: 9.7 FL (ref 8.9–12.7)
POTASSIUM SERPL-SCNC: 4.1 MMOL/L (ref 3.5–5.3)
PROT SERPL-MCNC: 7.7 G/DL (ref 6.4–8.2)
PROT UR STRIP-MCNC: NEGATIVE MG/DL
PROTHROMBIN TIME: 14.2 SECONDS (ref 11.6–14.5)
RBC # BLD AUTO: 5.23 MILLION/UL (ref 3.88–5.62)
SODIUM SERPL-SCNC: 137 MMOL/L (ref 136–145)
SP GR UR STRIP.AUTO: 1.01 (ref 1–1.03)
UROBILINOGEN UR QL STRIP.AUTO: 1 E.U./DL
WBC # BLD AUTO: 7.77 THOUSAND/UL (ref 4.31–10.16)

## 2020-08-27 PROCEDURE — 74177 CT ABD & PELVIS W/CONTRAST: CPT

## 2020-08-27 PROCEDURE — 85610 PROTHROMBIN TIME: CPT | Performed by: PHYSICIAN ASSISTANT

## 2020-08-27 PROCEDURE — 81003 URINALYSIS AUTO W/O SCOPE: CPT | Performed by: PHYSICIAN ASSISTANT

## 2020-08-27 PROCEDURE — 96361 HYDRATE IV INFUSION ADD-ON: CPT

## 2020-08-27 PROCEDURE — 83690 ASSAY OF LIPASE: CPT | Performed by: PHYSICIAN ASSISTANT

## 2020-08-27 PROCEDURE — 96360 HYDRATION IV INFUSION INIT: CPT

## 2020-08-27 PROCEDURE — 85730 THROMBOPLASTIN TIME PARTIAL: CPT | Performed by: PHYSICIAN ASSISTANT

## 2020-08-27 PROCEDURE — 80053 COMPREHEN METABOLIC PANEL: CPT | Performed by: PHYSICIAN ASSISTANT

## 2020-08-27 PROCEDURE — 36415 COLL VENOUS BLD VENIPUNCTURE: CPT | Performed by: PHYSICIAN ASSISTANT

## 2020-08-27 PROCEDURE — 99284 EMERGENCY DEPT VISIT MOD MDM: CPT

## 2020-08-27 PROCEDURE — G1004 CDSM NDSC: HCPCS

## 2020-08-27 PROCEDURE — 99284 EMERGENCY DEPT VISIT MOD MDM: CPT | Performed by: PHYSICIAN ASSISTANT

## 2020-08-27 PROCEDURE — 85025 COMPLETE CBC W/AUTO DIFF WBC: CPT | Performed by: PHYSICIAN ASSISTANT

## 2020-08-27 RX ORDER — CIPROFLOXACIN 500 MG/1
500 TABLET, FILM COATED ORAL ONCE
Status: COMPLETED | OUTPATIENT
Start: 2020-08-27 | End: 2020-08-27

## 2020-08-27 RX ORDER — METRONIDAZOLE 500 MG/1
500 TABLET ORAL EVERY 6 HOURS SCHEDULED
Qty: 40 TABLET | Refills: 0 | Status: SHIPPED | OUTPATIENT
Start: 2020-08-27 | End: 2020-09-06

## 2020-08-27 RX ORDER — CIPROFLOXACIN 500 MG/1
500 TABLET, FILM COATED ORAL 2 TIMES DAILY
Qty: 20 TABLET | Refills: 0 | Status: SHIPPED | OUTPATIENT
Start: 2020-08-27 | End: 2020-09-06

## 2020-08-27 RX ORDER — METRONIDAZOLE 500 MG/1
500 TABLET ORAL ONCE
Status: COMPLETED | OUTPATIENT
Start: 2020-08-27 | End: 2020-08-27

## 2020-08-27 RX ADMIN — CIPROFLOXACIN HYDROCHLORIDE 500 MG: 500 TABLET, FILM COATED ORAL at 13:52

## 2020-08-27 RX ADMIN — IOHEXOL 100 ML: 350 INJECTION, SOLUTION INTRAVENOUS at 12:16

## 2020-08-27 RX ADMIN — METRONIDAZOLE 500 MG: 500 TABLET, FILM COATED ORAL at 13:52

## 2020-08-27 RX ADMIN — SODIUM CHLORIDE 1000 ML: 0.9 INJECTION, SOLUTION INTRAVENOUS at 11:28

## 2020-08-27 NOTE — ED NOTES
Patient transported to CT scan via ambulatory     Ananda Palmer RN  08/27/20 45 Jitendra West RN  08/27/20 5915

## 2020-08-27 NOTE — DISCHARGE INSTRUCTIONS
Take antibiotics as directed for the full duration  No alcohol while taking the flagyl  Continue to alternate OTC tylenol and ibuprofen as needed for discomfort  Rest, fluids  You will need follow up with GI to discuss colonoscopy due to findings on CT scan  Follow up with GI for recheck or return to ER as needed

## 2020-08-27 NOTE — Clinical Note
Antwan Keerthi Dubose was seen and treated in our emergency department on 8/27/2020  Diagnosis:     Antwan  may return to work on return date  He may return on this date: 08/28/2020         If you have any questions or concerns, please don't hesitate to call        Flor Morales PA-C    ______________________________           _______________          _______________  Hospital Representative                              Date                                Time

## 2020-08-27 NOTE — ED PROVIDER NOTES
History  Chief Complaint   Patient presents with    Abdominal Pain     Lower abdominal intermittent pain since Monday  Denies any other symptoms  LUQ abdominal area that becomes numb  31year old male presents for evaluation of abdominal pain  He notes this has been an ongoing issue for several years and comes and goes  He notes he will develop an aching pain in his mid lower abdomen below his belly button  Pain primarily in lower abdomen  At times the pain becomes sharp and intensifies  He notes pain at present 1/10 but at times goes up to 9/10  I questioned the last time he had severe pain and he notes it's been a long time and unable to tell me  This has been ongoing for months  Denies nausea, vomiting, diarrhea  He notes issues with constipation and notes he always passes small stools  He has been moving his bowels and has been passing gas  Denies fever, chills  Denies any urinary complaints  Pain does not radiate  Denies pain in groin or testicles  Pt also notes he will occasionally get a pain in the LUQ during work but he notes this is unrelated  PMH unremarkable  No prior abdominal surgeries reported  No prior evaluation for symptoms          History provided by:  Patient   used: No    Abdominal Pain   Pain location:  Suprapubic  Pain quality: aching and sharp    Pain radiates to:  Does not radiate  Timing:  Intermittent  Progression:  Waxing and waning  Chronicity:  Recurrent  Context: not eating, not previous surgeries, not recent illness, not retching, not sick contacts, not suspicious food intake and not trauma    Worsened by:  Nothing  Associated symptoms: constipation    Associated symptoms: no chest pain, no chills, no cough, no diarrhea, no dysuria, no fatigue, no fever, no flatus, no hematuria, no nausea, no shortness of breath, no sore throat and no vomiting    Risk factors: obesity    Risk factors: has not had multiple surgeries and no recent hospitalization        Prior to Admission Medications   Prescriptions Last Dose Informant Patient Reported? Taking?   ibuprofen (MOTRIN) 600 mg tablet Past Month at Unknown time Self No Yes   Sig: Take 1 tablet (600 mg total) by mouth every 6 (six) hours as needed for mild pain   nicotine (NICODERM CQ) 14 mg/24hr TD 24 hr patch Not Taking at Unknown time  No No   Sig: Place 1 patch on the skin daily   Patient not taking: Reported on 4/1/2019      Facility-Administered Medications: None       Past Medical History:   Diagnosis Date    No known problems        Past Surgical History:   Procedure Laterality Date    FRACTURE SURGERY         History reviewed  No pertinent family history  I have reviewed and agree with the history as documented  E-Cigarette/Vaping    E-Cigarette Use Never User      E-Cigarette/Vaping Substances     Social History     Tobacco Use    Smoking status: Current Every Day Smoker     Packs/day: 0 50     Types: Cigarettes    Smokeless tobacco: Never Used   Substance Use Topics    Alcohol use: Yes     Comment: socially     Drug use: No       Review of Systems   Constitutional: Negative  Negative for chills, fatigue and fever  HENT: Negative  Negative for congestion, rhinorrhea and sore throat  Eyes: Negative  Negative for visual disturbance  Respiratory: Negative  Negative for cough, shortness of breath and wheezing  Cardiovascular: Negative  Negative for chest pain, palpitations and leg swelling  Gastrointestinal: Positive for abdominal pain and constipation  Negative for diarrhea, flatus, nausea and vomiting  Genitourinary: Negative  Negative for dysuria, flank pain, frequency, hematuria, scrotal swelling and testicular pain  Musculoskeletal: Negative  Negative for back pain, myalgias and neck pain  Skin: Negative  Negative for rash  Neurological: Negative  Negative for dizziness, light-headedness, numbness and headaches  Psychiatric/Behavioral: Negative  Negative for confusion  All other systems reviewed and are negative  Physical Exam  Physical Exam  Vitals signs and nursing note reviewed  Constitutional:       General: He is not in acute distress  Appearance: Normal appearance  He is well-developed  He is obese  He is not toxic-appearing or diaphoretic  HENT:      Head: Normocephalic and atraumatic  Right Ear: Hearing, tympanic membrane, ear canal and external ear normal       Left Ear: Hearing, tympanic membrane, ear canal and external ear normal       Nose: Nose normal       Mouth/Throat:      Mouth: Mucous membranes are moist       Pharynx: Oropharynx is clear  Uvula midline  No oropharyngeal exudate  Eyes:      General: Lids are normal  No scleral icterus  Extraocular Movements: Extraocular movements intact  Conjunctiva/sclera: Conjunctivae normal       Pupils: Pupils are equal, round, and reactive to light  Neck:      Musculoskeletal: Normal range of motion and neck supple  Vascular: No JVD  Trachea: Trachea normal  No tracheal deviation  Cardiovascular:      Rate and Rhythm: Normal rate and regular rhythm  Pulses: Normal pulses  Heart sounds: Normal heart sounds  No murmur  Pulmonary:      Effort: Pulmonary effort is normal  No respiratory distress  Breath sounds: Normal breath sounds  No wheezing, rhonchi or rales  Abdominal:      General: Bowel sounds are normal  There is no distension  Palpations: Abdomen is soft  Tenderness: There is abdominal tenderness in the suprapubic area and left lower quadrant  There is no right CVA tenderness, left CVA tenderness, guarding or rebound  Hernia: No hernia is present  Musculoskeletal: Normal range of motion  General: No tenderness  Right lower leg: No edema  Left lower leg: No edema  Skin:     General: Skin is warm and dry  Capillary Refill: Capillary refill takes less than 2 seconds  Findings: No rash  Nails: There is no clubbing  Neurological:      General: No focal deficit present  Mental Status: He is alert and oriented to person, place, and time  Cranial Nerves: No cranial nerve deficit  Sensory: No sensory deficit  Motor: No abnormal muscle tone        Gait: Gait normal    Psychiatric:         Mood and Affect: Mood normal          Speech: Speech normal          Behavior: Behavior normal          Vital Signs  ED Triage Vitals   Temperature Pulse Respirations Blood Pressure SpO2   08/27/20 1057 08/27/20 1057 08/27/20 1057 08/27/20 1057 08/27/20 1057   98 3 °F (36 8 °C) 88 17 132/77 98 %      Temp Source Heart Rate Source Patient Position - Orthostatic VS BP Location FiO2 (%)   08/27/20 1057 08/27/20 1057 08/27/20 1230 08/27/20 1057 --   Temporal Monitor Sitting Right arm       Pain Score       --                  Vitals:    08/27/20 1057 08/27/20 1230 08/27/20 1300   BP: 132/77 139/82 132/78   Pulse: 88 72 72   Patient Position - Orthostatic VS:  Sitting Sitting         Visual Acuity      ED Medications  Medications   sodium chloride 0 9 % bolus 1,000 mL (0 mL Intravenous Stopped 8/27/20 1332)   iohexol (OMNIPAQUE) 350 MG/ML injection (SINGLE-DOSE) 100 mL (100 mL Intravenous Given 8/27/20 1216)   ciprofloxacin (CIPRO) tablet 500 mg (500 mg Oral Given 8/27/20 1352)   metroNIDAZOLE (FLAGYL) tablet 500 mg (500 mg Oral Given 8/27/20 1352)       Diagnostic Studies  Results Reviewed     Procedure Component Value Units Date/Time    UA (URINE) with reflex to Scope [[id number]]  (Abnormal) Collected:  08/27/20 1236    Lab Status:  Final result Specimen:  Urine, Clean Catch Updated:  08/27/20 1241     Color, UA Yellow     Clarity, UA Slightly Cloudy     Specific [place], UA 1 010     pH, UA 8 5     Leukocytes, UA Negative     Nitrite, UA Negative     Protein, UA Negative mg/dl      Glucose, UA Negative mg/dl      Ketones, UA Negative mg/dl      Urobilinogen, UA 1 0 E U /dl      Bilirubin, UA Negative     Blood, UA Negative    Comprehensive metabolic panel [[id number]]  (Abnormal) Collected:  08/27/20 1127    Lab Status:  Final result Specimen:  Blood from Arm, Right Updated:  08/27/20 1150     Sodium 137 mmol/L      Potassium 4 1 mmol/L      Chloride 103 mmol/L      CO2 28 mmol/L      ANION GAP 6 mmol/L      BUN 11 mg/dL      Creatinine 0 88 mg/dL      Glucose 91 mg/dL      Calcium 8 6 mg/dL      AST 58 U/L      ALT 98 U/L      Alkaline Phosphatase 84 U/L      Total Protein 7 7 g/dL      Albumin 3 5 g/dL      Total Bilirubin 0 70 mg/dL      eGFR 114 ml/min/1 73sq m     Narrative:       [place] guidelines for Chronic Kidney Disease (CKD):     Stage 1 with normal or high GFR (GFR > 90 mL/min/1 73 square meters)    Stage 2 Mild CKD (GFR = 60-89 mL/min/1 73 square meters)    Stage 3A Moderate CKD (GFR = 45-59 mL/min/1 73 square meters)    Stage 3B Moderate CKD (GFR = 30-44 mL/min/1 73 square meters)    Stage 4 Severe CKD (GFR = 15-29 mL/min/1 73 square meters)    Stage 5 End Stage CKD (GFR <15 mL/min/1 73 square meters)  Note: GFR calculation is accurate only with a steady state creatinine    Lipase [[id number]]  (Abnormal) Collected:  08/27/20 1127    Lab Status:  Final result Specimen:  Blood from Arm, Right Updated:  08/27/20 1145     Lipase 70 u/L     Protime-INR [[id number]]  (Normal) Collected:  08/27/20 1127    Lab Status:  Final result Specimen:  Blood from Arm, Right Updated:  08/27/20 1143     Protime 14 2 seconds      INR 1 12    APTT [[id number]]  (Normal) Collected:  08/27/20 1127    Lab Status:  Final result Specimen:  Blood from Arm, Right Updated:  08/27/20 1143     PTT 28 seconds     CBC and differential [[id number]] Collected:  08/27/20 1127    Lab Status:  Final result Specimen:  Blood from Arm, Right Updated:  08/27/20 1136     WBC 7 77 Thousand/uL      RBC 5 23 Million/uL      Hemoglobin 15 4 g/dL      Hematocrit 46 1 %      MCV 88 fL      MCH 29 4 pg MCHC 33 4 g/dL      RDW 12 5 %      MPV 9 7 fL      Platelets 917 Thousands/uL      nRBC 0 /100 WBCs      Neutrophils Relative 63 %      Immat GRANS % 1 %      Lymphocytes Relative 24 %      Monocytes Relative 9 %      Eosinophils Relative 2 %      Basophils Relative 1 %      Neutrophils Absolute 5 01 Thousands/µL      Immature Grans Absolute 0 05 Thousand/uL      Lymphocytes Absolute 1 85 Thousands/µL      Monocytes Absolute 0 68 Thousand/µL      Eosinophils Absolute 0 14 Thousand/µL      Basophils Absolute 0 04 Thousands/µL                  CT abdomen pelvis with contrast   Final Result by Rupinder Stroud MD (08/27 1307)         1  Nonspecific bowel wall thickening involving the distal descending colon and sigmoid: Whether several small diverticula and where there is some very mild inflammatory stranding in the pericolic fat  This could reflect a nonspecific colitis or    diverticulitis  2   Somewhat prominent soft tissue with mild luminal narrowing at the rectosigmoid junction which may be a transient effect due to peristalsis, versus a stricture including possibility for underlying malignancy  This warrants further investigation with    colonoscopy following resolution of the patient's inflammatory process, as described above  3   Additional findings as noted  The study was marked in [facility] for immediate notification  Workstation performed: [id number]                    Procedures  Procedures         ED Course  ED Course as of Aug 27 1545   Thu Aug 27, 2020   1112 Pt has had ongoing symptoms for quite some time  Pain occurs intermittently but appears to be localized in periumbilical area and suprapubically and LLQ  Pt declines anything for pain at this time  Will obtain labs/urine and CT abd/pelv for further evaluation        1140 WBC: 7 77   1140 Hemoglobin: 15 4   1141 Platelet Count: 950   1147 INR: 1 12   1147 Protime: 14 2   1147 PTT: 28   1147 Lipase(!): 70   1153 Glucose, Random: 91   1153 Creatinine: 0 88   1153 BUN: 11   1153 Sodium: 137   1153 Potassium: 4 1   1153 Chloride: 103   1153 CO2: 28   1153 Anion Gap: 6   1153 Calcium: 8 6   1153 AST(!): 58   1153 ALT(!): 98   1153 Mildly elevated LFTs; has increased from values a year ago  1154 Alkaline Phosphatase: 84   1154 Total Protein: 7 7   1154 Albumin: 3 5   1154 TOTAL BILIRUBIN: 0 70   1154 eGFR: 114   1229 CT performed and pending interpretation  [facility]  8 5; otherwise unremarkable  UA (URINE) with reflex to Scope(!)   1337 IMPRESSION:        1  Nonspecific bowel wall thickening involving the distal descending colon and sigmoid: Whether several small diverticula and where there is some very mild inflammatory stranding in the pericolic fat  This could reflect a nonspecific colitis or   diverticulitis  2   Somewhat prominent soft tissue with mild luminal narrowing at the rectosigmoid junction which may be a transient effect due to peristalsis, versus a stricture including possibility for underlying malignancy  This warrants further investigation with   colonoscopy following resolution of the patient's inflammatory process, as described above  3   Additional findings as noted  CT abdomen pelvis with contrast   1337 Pt updated on all results  Long discussion  CT described findings of a nonspecific colitis vs diverticulitis  There is also an area of mild luminal narrowing at the rectosigmoid junction which could be due to peristalsis vs a stricture vs an underlying malignancy  Pt aware of need to see GI for follow up and to obtain colonoscopy to rule out an underlying cancer  Will place on course of cipro/flagyl for treatment of diverticulitis vs nonspecific colitis as described on CT scan  However, based on chronicity of pt's symptoms, I do not feel this is all acute  Based on pt's bowel symptoms and CT findings, this warrants further investigation with colonoscopy   Pt aware of need to see GI for follow up and to discuss a colonoscopy as cancer needs to be ruled out  Pt voiced understanding of this  We reviewed mild elevation in LFTs and recommended outpatient follow up with GI for recheck and further evaluation  Pt afebrile, hemodynamically stable and well appearing  He continues to decline anything for pain relief  Strict return precautions outlined  Note for work provided  Advised outpatient follow up with GI or return to ER for change in condition as outlined  Pt verbalized understanding and had no further questions  US AUDIT      Most Recent Value   Initial Alcohol Screen: US AUDIT-C    1  How often do you have a drink containing alcohol?  0 Filed at: 08/27/2020 1107   2  How many drinks containing alcohol do you have on a typical day you are drinking? 0 Filed at: 08/27/2020 1107   3a  Male UNDER 65: How often do you have five or more drinks on one occasion? 0 Filed at: 08/27/2020 1107   3b  FEMALE Any Age, or MALE 65+: How often do you have 4 or more drinks on one occassion? 0 Filed at: 08/27/2020 1107   Audit-C Score  0 Filed at: 08/27/2020 1107                  SIMONE/DAST-10      Most Recent Value   How many times in the past year have you    Used an illegal drug or used a prescription medication for non-medical reasons?   Never Filed at: 08/27/2020 1107                                MDM  Number of Diagnoses or Management Options  Colon wall thickening: new and requires workup  Diverticulitis large intestine: new and requires workup  Elevated LFTs: new and requires workup  Lower abdominal pain: new and requires workup     Amount and/or Complexity of Data Reviewed  Clinical lab tests: ordered and reviewed  Tests in the radiology section of CPT®: ordered and reviewed  Decide to obtain previous medical records or to obtain history from someone other than the patient: yes  Obtain history from someone other than the patient: yes  Review and summarize past medical records: yes  Independent visualization of images, tracings, or specimens: yes    Patient Progress  Patient progress: improved        Disposition  Final diagnoses:   Lower abdominal pain   Diverticulitis large intestine   Colon wall thickening   Elevated LFTs     Time reflects when diagnosis was documented in both MDM as applicable and the Disposition within this note     Time User Action Codes Description Comment    8/27/2020  1:43 PM Linford Genoa Add [R10 30] Lower abdominal pain     8/27/2020  1:44 PM Linford Genoa Add [K57 32] Diverticulitis large intestine     8/27/2020  1:46 PM Linford Genoa Add [K63 9] Colon wall thickening     8/27/2020  1:51 PM Linford Genoa Add [R79 89] Elevated LFTs       ED Disposition     ED Disposition Condition Date/Time Comment    Discharge Stable u Aug 27, 2020  1:43 PM Antwan Lugo discharge to home/self care              Follow-up Information     Follow up With Specialties Details Why Contact Info Additional [facility] Schedule an appointment as soon as possible for a visit   [address] [id number]  Lenny Dumont [facility] [address], [place], [place], [address] [facility] Emergency Department Emergency Medicine  As needed [address] [facility] ED, [place] [address], [place], [place], [address]          Discharge Medication List as of 8/27/2020  1:51 PM      START taking these medications    Details   ciprofloxacin (CIPRO) 500 mg tablet Take 1 tablet (500 mg total) by mouth 2 (two) times a day for 10 days, Starting Thu 8/27/2020, Until Sun 9/6/2020, Normal      metroNIDAZOLE (FLAGYL) 500 mg tablet Take 1 tablet (500 mg total) by mouth every 6 (six) hours for 10 days, Starting Thu 8/27/2020, Until Sun 9/6/2020, Normal         CONTINUE these medications which have NOT CHANGED    Details   ibuprofen (MOTRIN) 600 mg tablet Take 1 tablet (600 mg total) by mouth every 6 (six) hours as needed for mild pain, Starting Sun 3/24/2019, Print      nicotine (NICODERM CQ) 14 mg/24hr TD 24 hr patch Place 1 patch on the skin daily, Starting Mon 3/25/2019, Print           No discharge procedures on file      PDMP Review     None          ED Provider  Electronically Signed by           Maritza Jones PA-C  08/27/20 1033

## 2021-01-28 ENCOUNTER — OFFICE VISIT (OUTPATIENT)
Dept: URGENT CARE | Facility: CLINIC | Age: 31
End: 2021-01-28
Payer: COMMERCIAL

## 2021-01-28 VITALS
RESPIRATION RATE: 20 BRPM | HEART RATE: 78 BPM | OXYGEN SATURATION: 98 % | SYSTOLIC BLOOD PRESSURE: 163 MMHG | TEMPERATURE: 98.8 F | DIASTOLIC BLOOD PRESSURE: 95 MMHG

## 2021-01-28 DIAGNOSIS — K04.7 DENTAL INFECTION: Primary | ICD-10-CM

## 2021-01-28 PROCEDURE — 99213 OFFICE O/P EST LOW 20 MIN: CPT | Performed by: PHYSICIAN ASSISTANT

## 2021-01-28 PROCEDURE — S9088 SERVICES PROVIDED IN URGENT: HCPCS | Performed by: PHYSICIAN ASSISTANT

## 2021-01-28 RX ORDER — AMOXICILLIN 500 MG/1
500 CAPSULE ORAL EVERY 8 HOURS SCHEDULED
Qty: 30 CAPSULE | Refills: 0 | Status: SHIPPED | OUTPATIENT
Start: 2021-01-28 | End: 2021-01-28 | Stop reason: SDUPTHER

## 2021-01-28 RX ORDER — AMOXICILLIN 500 MG/1
500 CAPSULE ORAL EVERY 8 HOURS SCHEDULED
Qty: 30 CAPSULE | Refills: 0 | Status: SHIPPED | OUTPATIENT
Start: 2021-01-28 | End: 2021-02-07

## 2021-01-28 NOTE — PROGRESS NOTES
[facility]        NAME: Carol Ashton is a 31 y o  male  : 1990    MRN: [id number]  DATE: 2021  TIME: 12:10 PM    Assessment and Plan   Dental infection [K04 7]  1  Dental infection  amoxicillin (AMOXIL) 500 mg capsule    DISCONTINUED: amoxicillin (AMOXIL) 500 mg capsule         Patient Instructions     Patient Instructions   Dental Abscess   WHAT YOU NEED TO KNOW:   A dental abscess is a collection of pus in or around a tooth  A dental abscess is caused by bacteria  The bacteria usually enter the tooth when the enamel (outer part of the tooth) is damaged by tooth decay  Bacteria may also enter the tooth through a break or chip in the tooth, or a cut in the gum  Food particles that are stuck between the teeth for a long time may also lead to an abscess  DISCHARGE INSTRUCTIONS:   Return to the emergency department if:   · You have severe pain  · You have trouble breathing because of pain or swelling  Contact your healthcare provider if:   · Your symptoms get worse, even after treatment  · Your mouth is bleeding  · You cannot eat or drink because of pain or swelling  · Your abscess returns  · You have an injury that causes a crack in your tooth  · You have questions or concerns about your condition or care  Medicines: You may  need any of the following:  · Antibiotics  help treat a bacterial infection  · NSAIDs , such as ibuprofen, help decrease swelling, pain, and fever  This medicine is available with or without a doctor's order  NSAIDs can cause stomach bleeding or kidney problems in certain people  If you take blood thinner medicine, always ask your healthcare provider if NSAIDs are safe for you  Always read the medicine label and follow directions  · Acetaminophen  decreases pain and fever  It is available without a doctor's order  Ask how much to take and how often to take it  Follow directions   Read the labels of all other medicines you are using to see if they also contain acetaminophen, or ask your doctor or pharmacist  Acetaminophen can cause liver damage if not taken correctly  Do not use more than 4 grams (4,000 milligrams) total of acetaminophen in one day  · Prescription pain medicine  may be given  Ask your healthcare provider how to take this medicine safely  Some prescription pain medicines contain acetaminophen  Do not take other medicines that contain acetaminophen without talking to your healthcare provider  Too much acetaminophen may cause liver damage  Prescription pain medicine may cause constipation  Ask your healthcare provider how to prevent or treat constipation  · Take your medicine as directed  Contact your healthcare provider if you think your medicine is not helping or if you have side effects  Tell him of her if you are allergic to any medicine  Keep a list of the medicines, vitamins, and herbs you take  Include the amounts, and when and why you take them  Bring the list or the pill bottles to follow-up visits  Carry your medicine list with you in case of an emergency  Self-care:   · Rinse your mouth every 2 hours with salt water  This will help keep the area clean  · Gently brush your teeth twice a day with a soft tooth brush  This will help keep the area clean  · Eat soft foods as directed  Soft foods may cause less pain  Examples include applesauce, yogurt, and cooked pasta  Ask your healthcare provider how long to follow this instruction  · Apply a warm compress to your tooth or gum  Use a cotton ball or gauze soaked in warm water  Remove the compress in 10 minutes or when it becomes cool  Repeat 3 times a day  Prevent another abscess:   · Brush your teeth at least 2 times a day with fluoride toothpaste  · Use dental floss to clean between your teeth at least once a day  · Rinse your mouth with water or mouthwash after meals and snacks       · Chew sugarless gum after meals and snacks  · Limit foods that are sticky and high in sugar such as raisons  Also limit drinks high in sugar, such as soda  · See your dentist every 6 months for dental cleanings and oral exams  Follow up with your healthcare provider in 24 hours: Your healthcare provider will need to check your teeth and gums  Write down your questions so you remember to ask them during your visits  © Copyright [address] Information is for End User's use only and may not be sold, redistributed or otherwise used for commercial purposes  All illustrations and images included in CareNotes® are the copyrighted property of A D A M , Inc  or [facility]  The above information is an  only  It is not intended as medical advice for individual conditions or treatments  Talk to your doctor, nurse or pharmacist before following any medical regimen to see if it is safe and effective for you  Follow up with PCP in 3-5 days  Proceed to  ER if symptoms worsen  Chief Complaint     Chief Complaint   Patient presents with    Dental Pain     left side face tender , redness , swelling, has broken teeth          History of Present Illness         Patient is a 79-year-old male presenting with left-sided tooth pain, pressure, and swelling starting yesterday  Has a history of broken teeth in that left lower jaw line says he thinks he has an abscess developing now  Denies fever, muscle aches, body aches, sore throat, or ear pain  Is trying to get insurance in order to see a dentist but is concerned for the infection in the meantime  Review of Systems   Review of Systems   Constitutional: Negative for fatigue and fever  HENT: Positive for dental problem  Negative for sore throat  Musculoskeletal: Negative for arthralgias and myalgias  Psychiatric/Behavioral: Negative for confusion           Current Medications       Current Outpatient Medications:     ibuprofen (MOTRIN) 600 mg tablet, Take 1 tablet (600 mg total) by mouth every 6 (six) hours as needed for mild pain, Disp: 30 tablet, Rfl: 0    nicotine (NICODERM CQ) 14 mg/24hr TD 24 hr patch, Place 1 patch on the skin daily, Disp: 28 patch, Rfl: 0    amoxicillin (AMOXIL) 500 mg capsule, Take 1 capsule (500 mg total) by mouth every 8 (eight) hours for 10 days, Disp: 30 capsule, Rfl: 0    Current Allergies     Allergies as of 01/28/2021    (No Known Allergies)            The following portions of the patient's history were reviewed and updated as appropriate: allergies, current medications, past family history, past medical history, past social history, past surgical history and problem list      Past Medical History:   Diagnosis Date    No known problems        Past Surgical History:   Procedure Laterality Date    FRACTURE SURGERY         History reviewed  No pertinent family history  Medications have been verified  Objective   /95   Pulse 78   Temp 98 8 °F (37 1 °C)   Resp 20   SpO2 98%        Physical Exam     Physical Exam  Constitutional:       Appearance: Normal appearance  HENT:      Head: Normocephalic and atraumatic  Nose: Nose normal       Mouth/Throat:      Mouth: Mucous membranes are moist       Pharynx: Oropharynx is clear  Comments: Broken teeth and dental caries throughout the mouth  Left lower molars also have tenderness to palpation and swelling/erythema around the base of the tooth  Mild swelling of the left jaw  Neck:      Musculoskeletal: No muscular tenderness  Lymphadenopathy:      Cervical: No cervical adenopathy  Neurological:      Mental Status: He is alert     Psychiatric:         Mood and Affect: Mood normal          Behavior: Behavior normal

## 2021-01-28 NOTE — LETTER
January 28, 2021     Patient: Skyler Nowak   YOB: 1990   Date of Visit: 1/28/2021       To Whom it May Concern:    Skyler Nowak is under my professional care  He was seen in my office on 1/28/2021  He may return to work on 01/29/2021  If you have any questions or concerns, please don't hesitate to call           Sincerely,          FREDY COHEN        CC: No Recipients

## 2021-01-28 NOTE — PATIENT INSTRUCTIONS
Dental Abscess   WHAT YOU NEED TO KNOW:   A dental abscess is a collection of pus in or around a tooth  A dental abscess is caused by bacteria  The bacteria usually enter the tooth when the enamel (outer part of the tooth) is damaged by tooth decay  Bacteria may also enter the tooth through a break or chip in the tooth, or a cut in the gum  Food particles that are stuck between the teeth for a long time may also lead to an abscess  DISCHARGE INSTRUCTIONS:   Return to the emergency department if:   · You have severe pain  · You have trouble breathing because of pain or swelling  Contact your healthcare provider if:   · Your symptoms get worse, even after treatment  · Your mouth is bleeding  · You cannot eat or drink because of pain or swelling  · Your abscess returns  · You have an injury that causes a crack in your tooth  · You have questions or concerns about your condition or care  Medicines: You may  need any of the following:  · Antibiotics  help treat a bacterial infection  · NSAIDs , such as ibuprofen, help decrease swelling, pain, and fever  This medicine is available with or without a doctor's order  NSAIDs can cause stomach bleeding or kidney problems in certain people  If you take blood thinner medicine, always ask your healthcare provider if NSAIDs are safe for you  Always read the medicine label and follow directions  · Acetaminophen  decreases pain and fever  It is available without a doctor's order  Ask how much to take and how often to take it  Follow directions  Read the labels of all other medicines you are using to see if they also contain acetaminophen, or ask your doctor or pharmacist  Acetaminophen can cause liver damage if not taken correctly  Do not use more than 4 grams (4,000 milligrams) total of acetaminophen in one day  · Prescription pain medicine  may be given  Ask your healthcare provider how to take this medicine safely   Some prescription pain medicines contain acetaminophen  Do not take other medicines that contain acetaminophen without talking to your healthcare provider  Too much acetaminophen may cause liver damage  Prescription pain medicine may cause constipation  Ask your healthcare provider how to prevent or treat constipation  · Take your medicine as directed  Contact your healthcare provider if you think your medicine is not helping or if you have side effects  Tell him of her if you are allergic to any medicine  Keep a list of the medicines, vitamins, and herbs you take  Include the amounts, and when and why you take them  Bring the list or the pill bottles to follow-up visits  Carry your medicine list with you in case of an emergency  Self-care:   · Rinse your mouth every 2 hours with salt water  This will help keep the area clean  · Gently brush your teeth twice a day with a soft tooth brush  This will help keep the area clean  · Eat soft foods as directed  Soft foods may cause less pain  Examples include applesauce, yogurt, and cooked pasta  Ask your healthcare provider how long to follow this instruction  · Apply a warm compress to your tooth or gum  Use a cotton ball or gauze soaked in warm water  Remove the compress in 10 minutes or when it becomes cool  Repeat 3 times a day  Prevent another abscess:   · Brush your teeth at least 2 times a day with fluoride toothpaste  · Use dental floss to clean between your teeth at least once a day  · Rinse your mouth with water or mouthwash after meals and snacks  · Chew sugarless gum after meals and snacks  · Limit foods that are sticky and high in sugar such as raisons  Also limit drinks high in sugar, such as soda  · See your dentist every 6 months for dental cleanings and oral exams  Follow up with your healthcare provider in 24 hours: Your healthcare provider will need to check your teeth and gums   Write down your questions so you remember to ask them during your visits  © Copyright [address] Information is for End User's use only and may not be sold, redistributed or otherwise used for commercial purposes  All illustrations and images included in CareNotes® are the copyrighted property of A D A M , Inc  or [employer] [employer]  The above information is an  only  It is not intended as medical advice for individual conditions or treatments  Talk to your doctor, nurse or pharmacist before following any medical regimen to see if it is safe and effective for you

## 2021-06-25 ENCOUNTER — APPOINTMENT (EMERGENCY)
Dept: CT IMAGING | Facility: HOSPITAL | Age: 31
End: 2021-06-25
Payer: COMMERCIAL

## 2021-06-25 ENCOUNTER — HOSPITAL ENCOUNTER (EMERGENCY)
Facility: HOSPITAL | Age: 31
Discharge: HOME | End: 2021-06-25
Attending: EMERGENCY MEDICINE
Payer: COMMERCIAL

## 2021-06-25 VITALS
HEART RATE: 93 BPM | BODY MASS INDEX: 40.6 KG/M2 | OXYGEN SATURATION: 95 % | RESPIRATION RATE: 16 BRPM | SYSTOLIC BLOOD PRESSURE: 151 MMHG | TEMPERATURE: 97.9 F | DIASTOLIC BLOOD PRESSURE: 71 MMHG | HEIGHT: 71 IN | WEIGHT: 290 LBS

## 2021-06-25 DIAGNOSIS — K59.00 CONSTIPATION: ICD-10-CM

## 2021-06-25 DIAGNOSIS — K52.9 COLITIS: Primary | ICD-10-CM

## 2021-06-25 LAB
ALBUMIN SERPL BCP-MCNC: 3.2 G/DL (ref 3.5–5)
ALP SERPL-CCNC: 69 U/L (ref 46–116)
ALT SERPL W P-5'-P-CCNC: 45 U/L (ref 12–78)
ANION GAP SERPL CALCULATED.3IONS-SCNC: 9 MMOL/L (ref 4–13)
AST SERPL W P-5'-P-CCNC: 21 U/L (ref 5–45)
BASOPHILS # BLD AUTO: 0.06 THOUSANDS/ΜL (ref 0–0.1)
BASOPHILS NFR BLD AUTO: 1 % (ref 0–1)
BILIRUB SERPL-MCNC: 0.3 MG/DL (ref 0.2–1)
BILIRUB UR QL STRIP: NEGATIVE
BUN SERPL-MCNC: 13 MG/DL (ref 5–25)
CALCIUM ALBUM COR SERPL-MCNC: 8.9 MG/DL (ref 8.3–10.1)
CALCIUM SERPL-MCNC: 8.3 MG/DL (ref 8.3–10.1)
CHLORIDE SERPL-SCNC: 106 MMOL/L (ref 100–108)
CLARITY UR: NORMAL
CO2 SERPL-SCNC: 25 MMOL/L (ref 21–32)
COLOR UR: NORMAL
CREAT SERPL-MCNC: 0.81 MG/DL (ref 0.6–1.3)
EOSINOPHIL # BLD AUTO: 0.2 THOUSAND/ΜL (ref 0–0.61)
EOSINOPHIL NFR BLD AUTO: 2 % (ref 0–6)
ERYTHROCYTE [DISTWIDTH] IN BLOOD BY AUTOMATED COUNT: 12.7 % (ref 11.6–15.1)
GFR SERPL CREATININE-BSD FRML MDRD: 118 ML/MIN/1.73SQ M
GLUCOSE SERPL-MCNC: 104 MG/DL (ref 65–140)
GLUCOSE UR STRIP-MCNC: NEGATIVE MG/DL
HCT VFR BLD AUTO: 44.8 % (ref 36.5–49.3)
HGB BLD-MCNC: 15 G/DL (ref 12–17)
HGB UR QL STRIP.AUTO: NEGATIVE
IMM GRANULOCYTES # BLD AUTO: 0.15 THOUSAND/UL (ref 0–0.2)
IMM GRANULOCYTES NFR BLD AUTO: 2 % (ref 0–2)
KETONES UR STRIP-MCNC: NEGATIVE MG/DL
LEUKOCYTE ESTERASE UR QL STRIP: NEGATIVE
LIPASE SERPL-CCNC: 84 U/L (ref 73–393)
LYMPHOCYTES # BLD AUTO: 2.12 THOUSANDS/ΜL (ref 0.6–4.47)
LYMPHOCYTES NFR BLD AUTO: 22 % (ref 14–44)
MCH RBC QN AUTO: 29.4 PG (ref 26.8–34.3)
MCHC RBC AUTO-ENTMCNC: 33.5 G/DL (ref 31.4–37.4)
MCV RBC AUTO: 88 FL (ref 82–98)
MONOCYTES # BLD AUTO: 0.65 THOUSAND/ΜL (ref 0.17–1.22)
MONOCYTES NFR BLD AUTO: 7 % (ref 4–12)
NEUTROPHILS # BLD AUTO: 6.33 THOUSANDS/ΜL (ref 1.85–7.62)
NEUTS SEG NFR BLD AUTO: 66 % (ref 43–75)
NITRITE UR QL STRIP: NEGATIVE
NRBC BLD AUTO-RTO: 0 /100 WBCS
PH UR STRIP.AUTO: 6 [PH]
PLATELET # BLD AUTO: 307 THOUSANDS/UL (ref 149–390)
PMV BLD AUTO: 9.9 FL (ref 8.9–12.7)
POTASSIUM SERPL-SCNC: 3.8 MMOL/L (ref 3.5–5.3)
PROT SERPL-MCNC: 7.3 G/DL (ref 6.4–8.2)
PROT UR STRIP-MCNC: NEGATIVE MG/DL
RBC # BLD AUTO: 5.1 MILLION/UL (ref 3.88–5.62)
SODIUM SERPL-SCNC: 140 MMOL/L (ref 136–145)
SP GR UR STRIP.AUTO: 1.02 (ref 1–1.03)
UROBILINOGEN UR QL STRIP.AUTO: 1 E.U./DL
WBC # BLD AUTO: 9.51 THOUSAND/UL (ref 4.31–10.16)

## 2021-06-25 PROCEDURE — 85025 COMPLETE CBC W/AUTO DIFF WBC: CPT | Performed by: PHYSICIAN ASSISTANT

## 2021-06-25 PROCEDURE — 96360 HYDRATION IV INFUSION INIT: CPT

## 2021-06-25 PROCEDURE — 81003 URINALYSIS AUTO W/O SCOPE: CPT | Performed by: PHYSICIAN ASSISTANT

## 2021-06-25 PROCEDURE — 99284 EMERGENCY DEPT VISIT MOD MDM: CPT | Performed by: PHYSICIAN ASSISTANT

## 2021-06-25 PROCEDURE — 80053 COMPREHEN METABOLIC PANEL: CPT | Performed by: PHYSICIAN ASSISTANT

## 2021-06-25 PROCEDURE — 83690 ASSAY OF LIPASE: CPT | Performed by: PHYSICIAN ASSISTANT

## 2021-06-25 PROCEDURE — G1004 CDSM NDSC: HCPCS

## 2021-06-25 PROCEDURE — 36415 COLL VENOUS BLD VENIPUNCTURE: CPT | Performed by: PHYSICIAN ASSISTANT

## 2021-06-25 PROCEDURE — 99284 EMERGENCY DEPT VISIT MOD MDM: CPT

## 2021-06-25 PROCEDURE — 74177 CT ABD & PELVIS W/CONTRAST: CPT

## 2021-06-25 RX ORDER — METRONIDAZOLE 500 MG/1
500 TABLET ORAL EVERY 8 HOURS SCHEDULED
Qty: 30 TABLET | Refills: 0 | Status: SHIPPED | OUTPATIENT
Start: 2021-06-25 | End: 2021-07-05

## 2021-06-25 RX ORDER — CIPROFLOXACIN 500 MG/1
500 TABLET, FILM COATED ORAL 2 TIMES DAILY
Qty: 20 TABLET | Refills: 0 | Status: SHIPPED | OUTPATIENT
Start: 2021-06-25 | End: 2021-07-05

## 2021-06-25 RX ADMIN — IOHEXOL 100 ML: 350 INJECTION, SOLUTION INTRAVENOUS at 11:32

## 2021-06-25 RX ADMIN — SODIUM CHLORIDE 1000 ML: 0.9 INJECTION, SOLUTION INTRAVENOUS at 10:13

## 2021-06-25 NOTE — ED PROVIDER NOTES
History  Chief Complaint   Patient presents with    Abdominal Pain     c/o lower abdominal pain x 1 week  hx of diverticulitis  denies n,v,d  seen at urgent care last week given antibiotics completed antibiotics but no improvement  Patient presents to the emergency department today ambulatory via private vehicle alone offering a chief complaint of left lower quadrant abdominal tenderness that actually has been present for close to 1 month  The pain is nonradiating  It lasted few minutes at a time  He admits to decreased appetite  He denies blood in the stool denies constipation or diarrhea  Denies mucus in the stool  Denies back pain  No other abdominal pain is elicited  No fevers chills or sweats  Does have a history of diverticulitis x1 last fall that responded to what sounds to be ciprofloxacin and metronidazole  He states he went to an urgent care 1 week ago was prescribed Augmentin for 10 days he did finish this therapy and does not note improvement of the symptoms  He denies specific urinary symptoms but he states sometimes after he urinates the abdominal pain gets slightly better  No prior abdominal surgical history  None       Past Medical History:   Diagnosis Date    Diverticulitis     No known problems        Past Surgical History:   Procedure Laterality Date    FRACTURE SURGERY         History reviewed  No pertinent family history  I have reviewed and agree with the history as documented  E-Cigarette/Vaping    E-Cigarette Use Never User      E-Cigarette/Vaping Substances     Social History     Tobacco Use    Smoking status: Current Every Day Smoker     Packs/day: 0 50     Types: Cigarettes    Smokeless tobacco: Never Used   Vaping Use    Vaping Use: Never used   Substance Use Topics    Alcohol use: Yes     Comment: socially     Drug use: No       Review of Systems   Constitutional: Positive for appetite change  Negative for chills and fever     HENT: Negative for ear pain and sore throat  Eyes: Negative for pain and visual disturbance  Respiratory: Negative for cough and shortness of breath  Cardiovascular: Negative for chest pain and palpitations  Gastrointestinal: Positive for abdominal pain  Negative for nausea and vomiting  Genitourinary: Negative for dysuria, flank pain and hematuria  Musculoskeletal: Negative for arthralgias and back pain  Skin: Negative for color change and rash  Neurological: Negative for seizures and syncope  All other systems reviewed and are negative  Physical Exam  Physical Exam  Vitals and nursing note reviewed  Constitutional:       General: He is not in acute distress  Appearance: He is well-developed  He is obese  He is not ill-appearing, toxic-appearing or diaphoretic  HENT:      Head: Normocephalic and atraumatic  Eyes:      Conjunctiva/sclera: Conjunctivae normal    Cardiovascular:      Rate and Rhythm: Normal rate and regular rhythm  Heart sounds: No murmur heard  Pulmonary:      Effort: Pulmonary effort is normal  No respiratory distress  Breath sounds: Normal breath sounds  Abdominal:      Palpations: Abdomen is soft  Tenderness: There is abdominal tenderness in the left lower quadrant  Musculoskeletal:      Cervical back: Neck supple  Skin:     General: Skin is warm and dry  Capillary Refill: Capillary refill takes less than 2 seconds  Neurological:      General: No focal deficit present  Mental Status: He is alert     Psychiatric:         Mood and Affect: Mood normal          Vital Signs  ED Triage Vitals [06/25/21 0950]   Temperature Pulse Respirations Blood Pressure SpO2   97 9 °F (36 6 °C) 82 20 160/97 98 %      Temp Source Heart Rate Source Patient Position - Orthostatic VS BP Location FiO2 (%)   Tympanic Monitor Sitting Left arm --      Pain Score       2           Vitals:    06/25/21 1100 06/25/21 1130 06/25/21 1230 06/25/21 1300   BP: 157/70 151/88 131/77 139/87 Pulse: 85 79 78 74   Patient Position - Orthostatic VS: Sitting Sitting Sitting Sitting         Visual Acuity      ED Medications  Medications   sodium chloride 0 9 % bolus 1,000 mL (0 mL Intravenous Stopped 6/25/21 1110)   iohexol (OMNIPAQUE) 350 MG/ML injection (SINGLE-DOSE) 100 mL (100 mL Intravenous Given 6/25/21 1132)       Diagnostic Studies  Results Reviewed     Procedure Component Value Units Date/Time    UA (URINE) with reflex to Scope [[id number]] Collected: 06/25/21 1204    Lab Status: Final result Specimen: Urine, Clean Catch Updated: 06/25/21 1217     Color, UA Griselda     Clarity, UA Slightly Cloudy     Specific [place], UA 1 020     pH, UA 6 0     Leukocytes, UA Negative     Nitrite, UA Negative     Protein, UA Negative mg/dl      Glucose, UA Negative mg/dl      Ketones, UA Negative mg/dl      Urobilinogen, UA 1 0 E U /dl      Bilirubin, UA Negative     Blood, UA Negative    Comprehensive metabolic panel [[id number]]  (Abnormal) Collected: 06/25/21 1012    Lab Status: Final result Specimen: Blood from Arm, Right Updated: 06/25/21 1046     Sodium 140 mmol/L      Potassium 3 8 mmol/L      Chloride 106 mmol/L      CO2 25 mmol/L      ANION GAP 9 mmol/L      BUN 13 mg/dL      Creatinine 0 81 mg/dL      Glucose 104 mg/dL      Calcium 8 3 mg/dL      Corrected Calcium 8 9 mg/dL      AST 21 U/L      ALT 45 U/L      Alkaline Phosphatase 69 U/L      Total Protein 7 3 g/dL      Albumin 3 2 g/dL      Total Bilirubin 0 30 mg/dL      eGFR 118 ml/min/1 73sq m     Narrative:      [place] guidelines for Chronic Kidney Disease (CKD):     Stage 1 with normal or high GFR (GFR > 90 mL/min/1 73 square meters)    Stage 2 Mild CKD (GFR = 60-89 mL/min/1 73 square meters)    Stage 3A Moderate CKD (GFR = 45-59 mL/min/1 73 square meters)    Stage 3B Moderate CKD (GFR = 30-44 mL/min/1 73 square meters)    Stage 4 Severe CKD (GFR = 15-29 mL/min/1 73 square meters)    Stage 5 End Stage CKD (GFR <15 mL/min/1 73 square meters)  Note: GFR calculation is accurate only with a steady state creatinine    Lipase [[id number]]  (Normal) Collected: 06/25/21 1012    Lab Status: Final result Specimen: Blood from Arm, Right Updated: 06/25/21 1041     Lipase 84 u/L     CBC and differential [[id number]] Collected: 06/25/21 1012    Lab Status: Final result Specimen: Blood from Arm, Right Updated: 06/25/21 1031     WBC 9 51 Thousand/uL      RBC 5 10 Million/uL      Hemoglobin 15 0 g/dL      Hematocrit 44 8 %      MCV 88 fL      MCH 29 4 pg      MCHC 33 5 g/dL      RDW 12 7 %      MPV 9 9 fL      Platelets 906 Thousands/uL      nRBC 0 /100 WBCs      Neutrophils Relative 66 %      Immat GRANS % 2 %      Lymphocytes Relative 22 %      Monocytes Relative 7 %      Eosinophils Relative 2 %      Basophils Relative 1 %      Neutrophils Absolute 6 33 Thousands/µL      Immature Grans Absolute 0 15 Thousand/uL      Lymphocytes Absolute 2 12 Thousands/µL      Monocytes Absolute 0 65 Thousand/µL      Eosinophils Absolute 0 20 Thousand/µL      Basophils Absolute 0 06 Thousands/µL                  CT abdomen pelvis with contrast   Final Result by Lazarus Harts, MD (06/25 1234)      Circumferential thickening of the rectosigmoid junction similar to August 2020  While this may be sequela of inflammatory rectosigmoid stenosis/stricture, follow-up with direct visualization after the acute episode has resolved is advised as previously    recommended  Minimal thickening of the sigmoid colonic wall  Cannot exclude stercoral colitis given moderate stool in the colon upstream to the rectosigmoid abnormality  Colonic diverticulosis without acute diverticulitis  New retroperitoneal mild and left common iliac adenopathy presumably reactive  CT follow-up in 3 months is advised in addition to clinical evaluation  New mild splenomegaly        This study demonstrates a significant  finding and was documented as such in Epic for liaison and referring practitioner notification  This study demonstrates a finding requiring imaging follow-up and was documented as such in Epic  Workstation performed: [id number]                    Procedures  Procedures         ED Course  ED Course as of Jun 25 1341   Fri Jun 25, 2021   1049 WBC: 9 51   1049 Hemoglobin: 15 0   1049 Platelet Count: 864   1049 Lipase: 84   1049 Potassium: 3 8   1049 eGFR: 118   1253 Clarity, UA: Slightly Cloudy   1253 Color, UA: Griselda   1328 IMPRESSION:     Circumferential thickening of the rectosigmoid junction similar to August 2020  While this may be sequela of inflammatory rectosigmoid stenosis/stricture, follow-up with direct visualization after the acute episode has resolved is advised as previously   recommended      Minimal thickening of the sigmoid colonic wall  Cannot exclude stercoral colitis given moderate stool in the colon upstream to the rectosigmoid abnormality      Colonic diverticulosis without acute diverticulitis      New retroperitoneal mild and left common iliac adenopathy presumably reactive  CT follow-up in 3 months is advised in addition to clinical evaluation      New mild splenomegaly  SBIRT 20yo+      Most Recent Value   SBIRT (24 yo +)   In order to provide better care to our patients, we are screening all of our patients for alcohol and drug use  Would it be okay to ask you these screening questions? Yes Filed at: 06/25/2021 1055   Initial Alcohol Screen: US AUDIT-C    1  How often do you have a drink containing alcohol? 2 Filed at: 06/25/2021 1055   2  How many drinks containing alcohol do you have on a typical day you are drinking? 1 Filed at: 06/25/2021 1055   3a  Male UNDER 65: How often do you have five or more drinks on one occasion? 2 Filed at: 06/25/2021 1055   Audit-C Score  5 Filed at: 06/25/2021 1055   SIMONE: How many times in the past year have you       Used an illegal drug or used a prescription medication for non-medical reasons? Never Filed at: 06/25/2021 1055                    MDM    Disposition  Final diagnoses:   Colitis   Constipation     Time reflects when diagnosis was documented in both MDM as applicable and the Disposition within this note     Time User Action Codes Description Comment    6/25/2021  1:34 PM Merjudith Schraderve Add [K52 9] Colitis     6/25/2021  1:34 PM Merleen Shave Add [K59 00] Constipation       ED Disposition     ED Disposition Condition Date/Time Comment    Discharge Stable Fri Jun 25, 2021  1:34 PM Antwan Lugo discharge to home/self care  Follow-up Information     Follow up With Specialties Details Why Contact Info Additional Information    Sowmya Varela DO Family Medicine Schedule an appointment as soon as possible for a visit   [address] [facility] Call  for f/u colonoscopy  [address] [address]  Lenny Dumont [facility] MarshaDianna corrales [address] [place] [address], [id number] [phone]          Patient's Medications   Discharge Prescriptions    CIPROFLOXACIN (CIPRO) 500 MG TABLET    Take 1 tablet (500 mg total) by mouth 2 (two) times a day for 10 days       Start Date: 6/25/2021 End Date: 7/5/2021       Order Dose: 500 mg       Quantity: 20 tablet    Refills: 0    METRONIDAZOLE (FLAGYL) 500 MG TABLET    Take 1 tablet (500 mg total) by mouth every 8 (eight) hours for 10 days       Start Date: 6/25/2021 End Date: 7/5/2021       Order Dose: 500 mg       Quantity: 30 tablet    Refills: 0     No discharge procedures on file      PDMP Review     None          ED Provider  Electronically Signed by           Elaine Sanchez PA-C  06/25/21 1650

## 2021-06-25 NOTE — ED NOTES
Patient transported to CT scan via wheelchair     Scarlett Frazier RN  06/25/21 [address], RN  06/25/21 4408

## 2021-06-25 NOTE — Clinical Note
Antwan Mile Shaquille was seen and treated in our emergency department on 2021  x    Diagnosis: x    Antwan    He may return on this date: 2021         If you have any questions or concerns, please don't hesitate to call        Osito Stokes RN    ______________________________           _______________          _______________  Hospital Representative                              Date                                Time

## 2021-12-01 NOTE — PROGRESS NOTES
Progress Note - Infectious Disease   Antwan Lugo 33 y o  male MRN: [id number]  Unit/Bed#: E5 -01 Encounter: [id number]      Impression/Recommendations:  1  Sepsis, POA  Fever, tachycardia  Likely all related to left breast infection  Low clinical suspicion that patient also has bacterial pneumonia despite CT chest that indicated possible right lower lobe pneumonia  Patient with no significant symptoms to suggest bacterial pneumonia  No hypoxia  Procalcitonin level was negative on admission and today is mildly increased to 0 39  Fevers have improved after drainage of abscess  Patient still does not have any significant respiratory complaints  Admission blood cultures are negative      -antibiotic plan as below  -follow up pending blood cultures  -monitor temperatures and hemodynamics  -supportive care  -recheck procalcitonin level in a m      2  Left breast cellulitis with abscess  Now status post bedside I and D  Likely refractory to oral Keflex/Bactrim treatment due to development of undrained abscess  High suspicion for Staph infection, possibly MRSA      -continue IV vancomycin with dosing recommendations per pharmacy  -followup wound culture to guide final antibiotic recommendations  -serial exams  -surgery follow-up     3  Abnormal CT chest   CT chest suggested possible right lower lobe pneumonia  Doubt this is the cause of #1 as we have another clear explanation  No overt clinical evidence to suggest bacterial pneumonia  Procalcitonin level was negative  Repeat procalcitonin is 0 39  Fevers have improved  Patient denies respiratory complaints  Repeat procalcitonin level in a m   Monitor symptoms and temperatures for now      4  Morbid obesity  Likely risk factor for development of severe breast infection    Recommend weight loss, dietary changes         Antibiotics:  Antibiotic 3  Vancomycin 2  Post-drainage day 1    Discussed with patient, and with   Lizzette  Subjective:  Fevers have improved post drainage of breast abscess  Patient states he does feel little better today  Denies shortness of breath  Minimal cough  No nausea, vomiting, diarrhea  Objective:  Vitals:  Temp:  [98 6 °F (37 °C)-101 3 °F (38 5 °C)] 98 6 °F (37 °C)  HR:  [100-101] 100  Resp:  [20] 20  BP: (117-134)/(59-77) 117/77  SpO2:  [93 %-94 %] 94 %  Temp (24hrs), Av °F (37 8 °C), Min:98 6 °F (37 °C), Max:101 3 °F (38 5 °C)  Current: Temperature: 98 6 °F (37 °C)    Physical Exam:   General:  No acute distress  Psychiatric:  Awake and alert  Pulmonary:  Normal respiratory excursion without accessory muscle use  Breasts:  Left breast erythema is improving  No active drainage  Abdomen:  Soft, nontender  Extremities:  No edema  Skin:  No rashes    Lab Results:  I have personally reviewed pertinent labs  Results from last 7 days   Lab Units 19  0557 19  04319  1850 19  1639   POTASSIUM mmol/L 3 4* 3 6 3 7 3 9   CHLORIDE mmol/L 99* 99* 98* 101   CO2 mmol/L 23 20* 22 25   BUN mg/dL 8 8 9 9   CREATININE mg/dL 0 76 0 87 1 03 0 96   EGFR ml/min/1 73sq m 123 117 98 106   CALCIUM mg/dL 8 1* 8 1* 8 5 9 1   AST U/L  --  64* 65* 45   ALT U/L  --  53 56 74   ALK PHOS U/L  --  52 61 80     Results from last 7 days   Lab Units 19  0557 19  0437 19  1850   WBC Thousand/uL 4 71 5 36 6 43   HEMOGLOBIN g/dL 13 2 12 7 13 8   PLATELETS Thousands/uL 170 148* 165     Results from last 7 days   Lab Units 19  0413 19  1459 19  0434 19  2247 19  1909 19  1908 19  1850   BLOOD CULTURE   --   --   --   --  No Growth at 24 hrs   --  No Growth at 24 hrs     SPUTUM CULTURE   --   --  Culture too young- will reincubate  --   --   --   --    GRAM STAIN RESULT   --  1+ Polys*  2+ Gram positive cocci in pairs* 1+ Epithelial cells per low power field*  2+ Polys*  2+ Gram positive rods*  1+ Yeast*  --   --   --   --    WOUND CULTURE   --  No growth  --   --   --   --   --    INFLUENZA B PCR   --   --   --   --   --  Not Detected  --    RSV PCR   --   --   --   --   --  Not Detected  --    LEGIONELLA URINARY ANTIGEN   --   --   --  Negative  --   --   --    C DIFF TOXIN B  NEGATIVE for C difficle toxin by PCR    --   --   --   --   --   --        Imaging Studies:   I have personally reviewed pertinent imaging study reports and images in PACS  EKG, Pathology, and Other Studies:   I have personally reviewed pertinent reports  yes...

## 2021-12-03 ENCOUNTER — NURSE TRIAGE (OUTPATIENT)
Dept: OTHER | Facility: OTHER | Age: 31
End: 2021-12-03

## 2021-12-03 DIAGNOSIS — Z20.822 EXPOSURE TO COVID-19 VIRUS: Primary | ICD-10-CM

## 2021-12-03 PROCEDURE — U0005 INFEC AGEN DETEC AMPLI PROBE: HCPCS | Performed by: FAMILY MEDICINE

## 2021-12-03 PROCEDURE — U0003 INFECTIOUS AGENT DETECTION BY NUCLEIC ACID (DNA OR RNA); SEVERE ACUTE RESPIRATORY SYNDROME CORONAVIRUS 2 (SARS-COV-2) (CORONAVIRUS DISEASE [COVID-19]), AMPLIFIED PROBE TECHNIQUE, MAKING USE OF HIGH THROUGHPUT TECHNOLOGIES AS DESCRIBED BY CMS-2020-01-R: HCPCS | Performed by: FAMILY MEDICINE

## 2021-12-06 ENCOUNTER — NURSE TRIAGE (OUTPATIENT)
Dept: OTHER | Facility: OTHER | Age: 31
End: 2021-12-06

## 2021-12-06 DIAGNOSIS — Z11.59 ENCOUNTER FOR SCREENING FOR OTHER VIRAL DISEASES: Primary | ICD-10-CM

## 2021-12-07 PROCEDURE — U0005 INFEC AGEN DETEC AMPLI PROBE: HCPCS | Performed by: FAMILY MEDICINE

## 2021-12-07 PROCEDURE — U0003 INFECTIOUS AGENT DETECTION BY NUCLEIC ACID (DNA OR RNA); SEVERE ACUTE RESPIRATORY SYNDROME CORONAVIRUS 2 (SARS-COV-2) (CORONAVIRUS DISEASE [COVID-19]), AMPLIFIED PROBE TECHNIQUE, MAKING USE OF HIGH THROUGHPUT TECHNOLOGIES AS DESCRIBED BY CMS-2020-01-R: HCPCS | Performed by: FAMILY MEDICINE

## 2022-06-09 ENCOUNTER — APPOINTMENT (OUTPATIENT)
Dept: RADIOLOGY | Age: 32
End: 2022-06-09
Payer: COMMERCIAL

## 2022-06-09 ENCOUNTER — OFFICE VISIT (OUTPATIENT)
Dept: URGENT CARE | Age: 32
End: 2022-06-09
Payer: COMMERCIAL

## 2022-06-09 VITALS
RESPIRATION RATE: 16 BRPM | TEMPERATURE: 97.4 F | DIASTOLIC BLOOD PRESSURE: 89 MMHG | HEART RATE: 85 BPM | OXYGEN SATURATION: 98 % | SYSTOLIC BLOOD PRESSURE: 141 MMHG

## 2022-06-09 DIAGNOSIS — M25.521 PAIN IN JOINT OF RIGHT ELBOW: ICD-10-CM

## 2022-06-09 DIAGNOSIS — M25.521 RIGHT ELBOW PAIN: Primary | ICD-10-CM

## 2022-06-09 PROCEDURE — 99213 OFFICE O/P EST LOW 20 MIN: CPT

## 2022-06-09 PROCEDURE — 73080 X-RAY EXAM OF ELBOW: CPT

## 2022-06-09 NOTE — PROGRESS NOTES
[facility]        NAME: Los Rivera is a 34 y o  male  : 1990    MRN: [id number]  DATE: 2022  TIME: 1:22 PM    Assessment and Plan   Right elbow pain [M25 521]  1  Right elbow pain  Ambulatory Referral to Orthopedic Surgery   X-rays reviewed, no acute abnormality noted, awaiting official read  Recommend over-the-counter Tylenol/NSAIDs, heat/ice and compression as needed for discomfort  Plan is to follow-up with Orthopedics  Patient understands and agrees to this  Patient Instructions       Follow up with PCP in 3-5 days  Proceed to  ER if symptoms worsen  Chief Complaint     Chief Complaint   Patient presents with    Elbow Pain     Right elbow pain for 2 months         History of Present Illness       Patient is a 78-year-old right-handed male who presents for evaluation of intermittent right elbow pain for the past 2 months  He reports that he works as a , and notes that the pain is worse while at work and performing certain repetitive movements  He occasionally takes ibuprofen with little relief  He notes that he had left tennis elbow as a teenager, and this feels similar in away although not as constant  He denies numbness, tingling, injury to the area, swelling  He reports intermittent decreased ROM, and notes that today is a "good day" where he can fully flex and extend his right elbow, although on Monday he was unable flex or extend at all  Elbow Pain  Associated symptoms include arthralgias  Pertinent negatives include no abdominal pain, chest pain, chills, congestion, coughing, fever, headaches, joint swelling, myalgias, nausea, neck pain, rash, sore throat or vomiting  Review of Systems   Review of Systems   Constitutional: Negative for chills and fever  HENT: Negative for congestion, ear pain, postnasal drip, rhinorrhea, sinus pressure, sinus pain and sore throat  Eyes: Negative for pain and visual disturbance     Respiratory: Negative for cough and shortness of breath  Cardiovascular: Negative for chest pain and palpitations  Gastrointestinal: Negative for abdominal pain, constipation, diarrhea, nausea and vomiting  Endocrine: Negative  Genitourinary: Negative for dysuria and hematuria  Musculoskeletal: Positive for arthralgias  Negative for back pain, gait problem, joint swelling, myalgias, neck pain and neck stiffness  Skin: Negative for color change and rash  Allergic/Immunologic: Negative  Negative for environmental allergies  Neurological: Negative for dizziness, seizures, syncope, facial asymmetry, light-headedness and headaches  Hematological: Negative  Negative for adenopathy  Psychiatric/Behavioral: Negative  All other systems reviewed and are negative  Current Medications     No current outpatient medications on file  Current Allergies     Allergies as of 06/09/2022    (No Known Allergies)            The following portions of the patient's history were reviewed and updated as appropriate: allergies, current medications, past family history, past medical history, past social history, past surgical history and problem list      Past Medical History:   Diagnosis Date    Diverticulitis     No known problems        Past Surgical History:   Procedure Laterality Date    FRACTURE SURGERY         No family history on file  Medications have been verified  Objective   /89 (BP Location: Left arm, Patient Position: Sitting, Cuff Size: Standard)   Pulse 85   Temp (!) 97 4 °F (36 3 °C) (Temporal)   Resp 16   SpO2 98%        Physical Exam     Physical Exam  Constitutional:       General: He is not in acute distress  Appearance: Normal appearance  He is normal weight  He is not ill-appearing, toxic-appearing or diaphoretic  HENT:      Head: Normocephalic  Eyes:      Extraocular Movements: Extraocular movements intact  Pupils: Pupils are equal, round, and reactive to light  Cardiovascular:      Rate and Rhythm: Regular rhythm  Pulses: Normal pulses  Heart sounds: Normal heart sounds  Pulmonary:      Effort: Pulmonary effort is normal  No respiratory distress  Breath sounds: Normal breath sounds  No wheezing  Musculoskeletal:         General: Tenderness present  No swelling or signs of injury  Normal range of motion  Right elbow: No swelling, deformity, effusion or lacerations  Normal range of motion  Tenderness present in lateral epicondyle  Left elbow: No swelling, deformity, effusion or lacerations  Normal range of motion  No tenderness  No radial head, medial epicondyle or lateral epicondyle tenderness  Right forearm: Normal       Left forearm: Normal       Right wrist: No swelling, deformity, effusion, lacerations, tenderness, bony tenderness, snuff box tenderness or crepitus  Normal range of motion  Normal pulse  Right hand: Normal  No swelling, deformity, lacerations, tenderness or bony tenderness  Normal range of motion  Normal strength  Normal sensation  There is no disruption of two-point discrimination  Normal capillary refill  Normal pulse  Cervical back: Normal range of motion and neck supple  No rigidity or tenderness  Left lower leg: No edema  Comments: Right  strength strong, capillary refill less than 2 seconds, right radial pulse +2  Skin:     General: Skin is warm and dry  Capillary Refill: Capillary refill takes less than 2 seconds  Neurological:      General: No focal deficit present  Mental Status: He is alert and oriented to person, place, and time     Psychiatric:         Mood and Affect: Mood normal          Behavior: Behavior normal

## 2022-08-05 ENCOUNTER — OFFICE VISIT (OUTPATIENT)
Dept: URGENT CARE | Age: 32
End: 2022-08-05
Payer: COMMERCIAL

## 2022-08-05 VITALS
SYSTOLIC BLOOD PRESSURE: 156 MMHG | RESPIRATION RATE: 16 BRPM | TEMPERATURE: 97.8 F | DIASTOLIC BLOOD PRESSURE: 95 MMHG | OXYGEN SATURATION: 99 % | HEART RATE: 90 BPM

## 2022-08-05 DIAGNOSIS — S91.331A PUNCTURE WOUND OF RIGHT FOOT, INITIAL ENCOUNTER: Primary | ICD-10-CM

## 2022-08-05 PROCEDURE — 90471 IMMUNIZATION ADMIN: CPT

## 2022-08-05 PROCEDURE — 99213 OFFICE O/P EST LOW 20 MIN: CPT

## 2022-08-05 PROCEDURE — 90715 TDAP VACCINE 7 YRS/> IM: CPT

## 2022-08-05 NOTE — PATIENT INSTRUCTIONS
Please monitor puncture wound for signs of infection including drainage, redness, fever or chills  If this occurs, please seek medical care immediately

## 2022-08-05 NOTE — PROGRESS NOTES
[facility]        NAME: Nichole Nunes is a 34 y o  male  : 1990    MRN: [id number]  DATE: 2022  TIME: 4:57 PM    Assessment and Plan   Puncture wound of right foot, initial encounter [S91 331A]  1  Puncture wound of right foot, initial encounter  Tdap Vaccine greater than or equal to 8yo     Tdap given in office  Patient to monitor foot for signs of infection, including redness, drainage, fever chills  He is to return to office if symptoms occur  Patient agrees with current plan of care and all questions and concerns were addressed this time  Patient Instructions     Monitor puncture site as discussed  Follow up with PCP in 3-5 days  Proceed to  ER if symptoms worsen  Chief Complaint     Chief Complaint   Patient presents with    Foreign Body in Skin     Stepped on nail, today, patient states it has been over 15 years since tetanus         History of Present Illness       Patient presenting for evaluation of puncture wound to right foot  Patient states that he stepped on a nail earlier today, which punctured his right sole of foot  He states that he is having mild tenderness over the area, but denies any signs of infection  Patient states that he is not up-to-date on his tetanus shot, and is requesting 1 at this time  He denies any redness, drainage, fevers or chills at this time  Review of Systems   Review of Systems   Constitutional: Negative for chills and fever  HENT: Negative for ear pain and sore throat  Eyes: Negative for pain and visual disturbance  Respiratory: Negative for cough and shortness of breath  Cardiovascular: Negative for chest pain and palpitations  Gastrointestinal: Negative for abdominal pain and vomiting  Genitourinary: Negative for dysuria and hematuria  Musculoskeletal: Negative for arthralgias and back pain  Skin: Positive for wound  Negative for color change and rash  Neurological: Negative for seizures and syncope  All other systems reviewed and are negative  Current Medications     No current outpatient medications on file  Current Allergies     Allergies as of 08/05/2022    (No Known Allergies)            The following portions of the patient's history were reviewed and updated as appropriate: allergies, current medications, past family history, past medical history, past social history, past surgical history and problem list      Past Medical History:   Diagnosis Date    Diverticulitis     No known problems        Past Surgical History:   Procedure Laterality Date    FRACTURE SURGERY         History reviewed  No pertinent family history  Medications have been verified  Objective   /95   Pulse 90   Temp 97 8 °F (36 6 °C)   Resp 16   SpO2 99%        Physical Exam     Physical Exam  Vitals and nursing note reviewed  Constitutional:       General: He is not in acute distress  Appearance: Normal appearance  He is not ill-appearing  HENT:      Head: Normocephalic and atraumatic  Right Ear: Tympanic membrane normal       Left Ear: Tympanic membrane normal       Nose: No congestion or rhinorrhea  Mouth/Throat:      Mouth: Mucous membranes are moist       Pharynx: Oropharynx is clear  No oropharyngeal exudate or posterior oropharyngeal erythema  Eyes:      Extraocular Movements: Extraocular movements intact  Conjunctiva/sclera: Conjunctivae normal       Pupils: Pupils are equal, round, and reactive to light  Cardiovascular:      Rate and Rhythm: Normal rate and regular rhythm  Pulses: Normal pulses  Heart sounds: Normal heart sounds  No murmur heard  No friction rub  No gallop  Pulmonary:      Effort: No respiratory distress  Breath sounds: Normal breath sounds  No wheezing, rhonchi or rales  Abdominal:      General: Bowel sounds are normal       Palpations: Abdomen is soft  Tenderness: There is no abdominal tenderness     Musculoskeletal: General: No tenderness  Normal range of motion  Cervical back: Normal range of motion and neck supple  Feet:    Skin:     General: Skin is warm and dry  Capillary Refill: Capillary refill takes less than 2 seconds  Findings: Wound present  Neurological:      General: No focal deficit present  Mental Status: He is alert and oriented to person, place, and time     Psychiatric:         Mood and Affect: Mood normal          Behavior: Behavior normal

## 2022-08-07 ENCOUNTER — OFFICE VISIT (OUTPATIENT)
Dept: URGENT CARE | Age: 32
End: 2022-08-07
Payer: COMMERCIAL

## 2022-08-07 VITALS — SYSTOLIC BLOOD PRESSURE: 114 MMHG | DIASTOLIC BLOOD PRESSURE: 76 MMHG | HEART RATE: 82 BPM | TEMPERATURE: 97.8 F

## 2022-08-07 DIAGNOSIS — K04.7 DENTAL ABSCESS: Primary | ICD-10-CM

## 2022-08-07 PROCEDURE — 99213 OFFICE O/P EST LOW 20 MIN: CPT | Performed by: STUDENT IN AN ORGANIZED HEALTH CARE EDUCATION/TRAINING PROGRAM

## 2022-08-07 RX ORDER — CLINDAMYCIN HYDROCHLORIDE 300 MG/1
300 CAPSULE ORAL 3 TIMES DAILY
Qty: 21 CAPSULE | Refills: 0 | Status: SHIPPED | OUTPATIENT
Start: 2022-08-07 | End: 2022-08-14

## 2022-08-07 NOTE — PROGRESS NOTES
[facility]        NAME: Regina Mcarthur is a 34 y o  male  : 1990    MRN: [id number]  DATE: 2022  TIME: 11:44 AM    Assessment and Plan   Dental abscess [K04 7]  1  Dental abscess  clindamycin (CLEOCIN) 300 MG capsule         Patient Instructions       Follow up with PCP in 3-5 days  Proceed to  ER if symptoms worsen  Chief Complaint     Chief Complaint   Patient presents with    Dental Pain     Patient c/o of left lower molar pain having neck and cheek pain also- previous no dental coverage so no dental care- plans to make an appointment this week with a dentist         History of Present Illness       HPI  Patient presents today complaining of worsening left lower tooth pain he is having pain on chewing and pain in his neck  Patient does not have any dental coverage in the past but now has dental coverage  Patient denies any fevers or chills  Review of Systems   Review of Systems  Per hpi     Current Medications       Current Outpatient Medications:     clindamycin (CLEOCIN) 300 MG capsule, Take 1 capsule (300 mg total) by mouth 3 (three) times a day for 7 days, Disp: 21 capsule, Rfl: 0    Current Allergies     Allergies as of 2022    (No Known Allergies)            The following portions of the patient's history were reviewed and updated as appropriate: allergies, current medications, past family history, past medical history, past social history, past surgical history and problem list      Past Medical History:   Diagnosis Date    Diverticulitis     No known problems        Past Surgical History:   Procedure Laterality Date    FRACTURE SURGERY         No family history on file  Medications have been verified  Objective   /76 (BP Location: Right arm, Patient Position: Sitting, Cuff Size: Standard)   Pulse 82   Temp 97 8 °F (36 6 °C)   No LMP for male patient         Physical Exam     Physical Exam  Constitutional:       General: He is not in acute distress  Appearance: He is well-developed  He is not diaphoretic  HENT:      Head: Normocephalic and atraumatic  Right Ear: Tympanic membrane and external ear normal       Left Ear: Tympanic membrane and external ear normal       Mouth/Throat:      Mouth: Mucous membranes are moist       Pharynx: Oropharynx is clear  Posterior oropharyngeal erythema present  No oropharyngeal exudate  Comments: Erythema of left rear molar lwoer  Eyes:      Extraocular Movements: Extraocular movements intact  Conjunctiva/sclera: Conjunctivae normal    Cardiovascular:      Rate and Rhythm: Normal rate and regular rhythm  Heart sounds: Normal heart sounds  Pulmonary:      Effort: Pulmonary effort is normal  No respiratory distress  Breath sounds: Normal breath sounds  No wheezing  Abdominal:      General: Bowel sounds are normal  There is no distension  Palpations: Abdomen is soft  There is no mass  Tenderness: There is no abdominal tenderness  Musculoskeletal:         General: No swelling or tenderness  Cervical back: Normal range of motion  Right lower leg: No edema  Left lower leg: No edema  Lymphadenopathy:      Cervical: No cervical adenopathy  Skin:     General: Skin is warm  Neurological:      Mental Status: He is alert and oriented to person, place, and time

## 2024-02-21 PROBLEM — A41.9 SEPSIS (HCC): Status: RESOLVED | Noted: 2019-03-20 | Resolved: 2024-02-21

## 2024-02-21 PROBLEM — J18.9 COMMUNITY ACQUIRED PNEUMONIA: Status: RESOLVED | Noted: 2019-03-20 | Resolved: 2024-02-21

## 2025-03-27 ENCOUNTER — OFFICE VISIT (OUTPATIENT)
Dept: URGENT CARE | Facility: CLINIC | Age: 35
End: 2025-03-27
Payer: COMMERCIAL

## 2025-03-27 VITALS
BODY MASS INDEX: 35.67 KG/M2 | TEMPERATURE: 97.6 F | RESPIRATION RATE: 18 BRPM | WEIGHT: 254.8 LBS | SYSTOLIC BLOOD PRESSURE: 126 MMHG | HEIGHT: 71 IN | OXYGEN SATURATION: 96 % | HEART RATE: 74 BPM | DIASTOLIC BLOOD PRESSURE: 82 MMHG

## 2025-03-27 DIAGNOSIS — B34.9 ACUTE VIRAL SYNDROME: Primary | ICD-10-CM

## 2025-03-27 PROCEDURE — 99213 OFFICE O/P EST LOW 20 MIN: CPT

## 2025-03-27 NOTE — LETTER
March 27, 2025     Patient: Antwan Lugo   YOB: 1990   Date of Visit: 3/27/2025       To Whom It May Concern:    It is my medical opinion that Antwan Lugo may return to work on 03/28/2025 .    If you have any questions or concerns, please don't hesitate to call.         Sincerely,        DELMER Paulson    CC: No Recipients

## 2025-03-27 NOTE — PROGRESS NOTES
[facility]        NAME: Antwan Lugo is a 34 y.o. male  : 1990    MRN: [id number]  DATE: 2025  TIME: 9:27 AM    Assessment and Plan   Acute viral syndrome [B34.9]  1. Acute viral syndrome              Patient Instructions   Saline nasal spray as often as needed in each nostril  Flonase nasal spray 1 spray to each nostril daily  Take either Zyrtec or Allegra or the generic on a regular basis  Mucinex in the blue box for congestion    Increase your fluids and rest  Motrin for fever or discomfort  If you are not feeling any better follow-up with your PCP    Follow up with PCP in 3-5 days.  Proceed to  ER if symptoms worsen.    If tests have been performed at [facility], our office will contact you with results if changes need to be made to the care plan discussed with you at the visit.  You can review your full results on [facility].    Chief Complaint     Chief Complaint   Patient presents with    Nasal Congestion     Started on  with increased nasal congestion and congested cough, tried allergy medication on  with no relief, denies fever but reports chills         History of Present Illness       This is a 34-year-old male who presents today with stuffy nose congestion postnasal drip that started on Saturday.  He denies any shortness of breath fever chills.  He states that his throat only hurts when he breathes.  Now when he swallows.  He has been taking over-the-counter generic for Allegra with no relief so he stopped taking it.  Oxygen saturations were 97 to 98% in the office lungs were clear.  We did discuss that he does have seasonal allergies but does not take anything.  Patient instructed to start taking his allergy medicine on a regular basis        Review of Systems   Review of Systems   Constitutional:  Positive for fatigue and fever.   HENT:  Positive for congestion, postnasal drip, rhinorrhea and sore throat.    Respiratory:  Positive for cough. Negative  "for shortness of breath.    Cardiovascular: Negative.    Gastrointestinal: Negative.    Genitourinary: Negative.    Musculoskeletal: Negative.    Skin: Negative.    Neurological:  Negative for headaches.         Current Medications     No current outpatient medications on file.    Current Allergies     Allergies as of 03/27/2025    (No Known Allergies)            The following portions of the patient's history were reviewed and updated as appropriate: allergies, current medications, past family history, past medical history, past social history, past surgical history and problem list.     Past Medical History:   Diagnosis Date    Diverticulitis     No known problems        Past Surgical History:   Procedure Laterality Date    FRACTURE SURGERY         History reviewed. No pertinent family history.      Medications have been verified.        Objective   /82 (BP Location: Left arm, Patient Position: Sitting, Cuff Size: Large)   Pulse 74   Temp 97.6 °F (36.4 °C) (Tympanic)   Resp 18   Ht 5' 11\" (1.803 m)   Wt 116 kg (254 lb 12.8 oz)   SpO2 96%   BMI 35.54 kg/m²   No LMP for male patient.       Physical Exam     Physical Exam  Constitutional:       Appearance: Normal appearance.   HENT:      Head: Normocephalic and atraumatic.      Right Ear: Tympanic membrane, ear canal and external ear normal.      Left Ear: Tympanic membrane, ear canal and external ear normal.      Nose: Congestion and rhinorrhea present.      Mouth/Throat:      Mouth: Mucous membranes are moist.      Pharynx: No posterior oropharyngeal erythema.   Eyes:      Conjunctiva/sclera: Conjunctivae normal.      Pupils: Pupils are equal, round, and reactive to light.   Cardiovascular:      Rate and Rhythm: Normal rate and regular rhythm.      Pulses: Normal pulses.      Heart sounds: Normal heart sounds.   Pulmonary:      Effort: Pulmonary effort is normal. No respiratory distress.      Breath sounds: Normal breath sounds. No wheezing, rhonchi or " rales.   Abdominal:      General: Abdomen is flat. Bowel sounds are normal.   Musculoskeletal:         General: Normal range of motion.   Lymphadenopathy:      Cervical: No cervical adenopathy.   Skin:     General: Skin is warm and dry.      Capillary Refill: Capillary refill takes less than 2 seconds.   Neurological:      General: No focal deficit present.      Mental Status: He is alert and oriented to person, place, and time. Mental status is at baseline.   Psychiatric:         Mood and Affect: Mood normal.         Thought Content: Thought content normal.         Judgment: Judgment normal.

## 2025-04-02 ENCOUNTER — OFFICE VISIT (OUTPATIENT)
Dept: FAMILY MEDICINE CLINIC | Facility: CLINIC | Age: 35
End: 2025-04-02
Payer: COMMERCIAL

## 2025-04-02 VITALS
SYSTOLIC BLOOD PRESSURE: 114 MMHG | DIASTOLIC BLOOD PRESSURE: 72 MMHG | HEIGHT: 71 IN | OXYGEN SATURATION: 96 % | HEART RATE: 73 BPM | WEIGHT: 255 LBS | BODY MASS INDEX: 35.7 KG/M2

## 2025-04-02 DIAGNOSIS — H66.002 ACUTE SUPPURATIVE OTITIS MEDIA OF LEFT EAR WITHOUT SPONTANEOUS RUPTURE OF TYMPANIC MEMBRANE, RECURRENCE NOT SPECIFIED: ICD-10-CM

## 2025-04-02 DIAGNOSIS — Z11.4 SCREENING FOR HIV (HUMAN IMMUNODEFICIENCY VIRUS): ICD-10-CM

## 2025-04-02 DIAGNOSIS — R93.5 ABNORMAL CT OF THE ABDOMEN: Primary | ICD-10-CM

## 2025-04-02 DIAGNOSIS — K63.9 MURAL THICKENING OF SIGMOID COLON: ICD-10-CM

## 2025-04-02 DIAGNOSIS — Z72.0 TOBACCO ABUSE: ICD-10-CM

## 2025-04-02 DIAGNOSIS — Z13.220 SCREENING FOR LIPID DISORDERS: ICD-10-CM

## 2025-04-02 DIAGNOSIS — J30.2 SEASONAL ALLERGIES: ICD-10-CM

## 2025-04-02 DIAGNOSIS — Z11.59 NEED FOR HEPATITIS C SCREENING TEST: ICD-10-CM

## 2025-04-02 DIAGNOSIS — H69.90 DYSFUNCTION OF EUSTACHIAN TUBE, UNSPECIFIED LATERALITY: ICD-10-CM

## 2025-04-02 DIAGNOSIS — Z00.00 HEALTHCARE MAINTENANCE: ICD-10-CM

## 2025-04-02 DIAGNOSIS — E66.01 SEVERE OBESITY (HCC): ICD-10-CM

## 2025-04-02 PROBLEM — B34.9 ACUTE VIRAL SYNDROME: Status: RESOLVED | Noted: 2025-03-27 | Resolved: 2025-04-02

## 2025-04-02 PROBLEM — N61.1 ABSCESS OF LEFT BREAST: Status: RESOLVED | Noted: 2019-03-21 | Resolved: 2025-04-02

## 2025-04-02 PROBLEM — R93.89 ABNORMAL CT OF THE CHEST: Status: RESOLVED | Noted: 2019-03-24 | Resolved: 2025-04-02

## 2025-04-02 PROBLEM — L03.90 CELLULITIS: Status: RESOLVED | Noted: 2019-03-20 | Resolved: 2025-04-02

## 2025-04-02 PROCEDURE — 99204 OFFICE O/P NEW MOD 45 MIN: CPT | Performed by: FAMILY MEDICINE

## 2025-04-02 RX ORDER — AZELASTINE 1 MG/ML
2 SPRAY, METERED NASAL 2 TIMES DAILY
Qty: 30 ML | Refills: 3 | Status: SHIPPED | OUTPATIENT
Start: 2025-04-02

## 2025-04-02 RX ORDER — AZITHROMYCIN 250 MG/1
TABLET, FILM COATED ORAL
Qty: 6 TABLET | Refills: 0 | Status: SHIPPED | OUTPATIENT
Start: 2025-04-02 | End: 2025-04-07

## 2025-04-02 RX ORDER — METHYLPREDNISOLONE 4 MG/1
TABLET ORAL
Qty: 1 EACH | Refills: 0 | Status: SHIPPED | OUTPATIENT
Start: 2025-04-02 | End: 2025-04-08

## 2025-04-02 NOTE — ASSESSMENT & PLAN NOTE
Complete cessation recommended  Orders:  •  CBC; Future  •  Comprehensive metabolic panel; Future  •  Lipid Panel with Direct LDL reflex; Future  •  TSH, 3rd generation with Free T4 reflex; Future  •  UA (URINE) with reflex to Scope; Future

## 2025-04-02 NOTE — ASSESSMENT & PLAN NOTE
As above  Orders:  •  CBC; Future  •  Comprehensive metabolic panel; Future  •  Lipid Panel with Direct LDL reflex; Future  •  TSH, 3rd generation with Free T4 reflex; Future  •  UA (URINE) with reflex to Scope; Future  •  Ambulatory Referral to Colorectal Surgery; Future

## 2025-04-02 NOTE — ASSESSMENT & PLAN NOTE
Astelin nasal spray prescribed  Orders:  •  azelastine (ASTELIN) 0.1 % nasal spray; 2 sprays into each nostril 2 (two) times a day Use in each nostril as directed  •  methylPREDNISolone 4 MG tablet therapy pack; Use as directed on package  •  CBC; Future  •  Comprehensive metabolic panel; Future  •  Lipid Panel with Direct LDL reflex; Future  •  TSH, 3rd generation with Free T4 reflex; Future  •  UA (URINE) with reflex to Scope; Future

## 2025-04-02 NOTE — ASSESSMENT & PLAN NOTE
Discussed with patient.  Patient does not remember being told to have a follow-up.  Per radiology recommend direct visualization will refer to colorectal, Dr. Ayan Piña  Orders:  •  CBC; Future  •  Comprehensive metabolic panel; Future  •  Lipid Panel with Direct LDL reflex; Future  •  TSH, 3rd generation with Free T4 reflex; Future  •  UA (URINE) with reflex to Scope; Future  •  Ambulatory Referral to Colorectal Surgery; Future

## 2025-04-02 NOTE — ASSESSMENT & PLAN NOTE
Discussed hepatitis C and HIV screening, order placed  Orders:  •  CBC; Future  •  Comprehensive metabolic panel; Future  •  Lipid Panel with Direct LDL reflex; Future  •  TSH, 3rd generation with Free T4 reflex; Future  •  UA (URINE) with reflex to Scope; Future

## 2025-04-02 NOTE — PATIENT INSTRUCTIONS
Follow with colorectal specialist, Dr. Ayan Piña, for thickening of the rectosigmoid area on CT 2021  Continue with your diet exercise and weight loss  Use Astelin  nasal spray 2 sprays both nostrils twice daily  Finish Medrol Dosepak as directed on pack  Finish Zithromax as directed on pack  Return in 4 weeks for office visit and blood work sooner if needed

## 2025-04-02 NOTE — PROGRESS NOTES
Name: Antwan Lugo      : 1990      MRN: [id number]  Encounter Provider: Tavares Barnd DO  Encounter Date: 2025   Encounter department: [facility]  Recheck 4 weeks for office visit and blood work follow-up  :  Assessment & Plan  Abnormal CT of the abdomen  Discussed with patient.  Patient does not remember being told to have a follow-up.  Per radiology recommend direct visualization will refer to colorectal, Dr. Ayan Piña  Orders:  •  CBC; Future  •  Comprehensive metabolic panel; Future  •  Lipid Panel with Direct LDL reflex; Future  •  TSH, 3rd generation with Free T4 reflex; Future  •  UA (URINE) with reflex to Scope; Future  •  Ambulatory Referral to Colorectal Surgery; Future    Mural thickening of sigmoid colon  As above  Orders:  •  CBC; Future  •  Comprehensive metabolic panel; Future  •  Lipid Panel with Direct LDL reflex; Future  •  TSH, 3rd generation with Free T4 reflex; Future  •  UA (URINE) with reflex to Scope; Future  •  Ambulatory Referral to Colorectal Surgery; Future    Tobacco abuse  Complete cessation recommended  Orders:  •  CBC; Future  •  Comprehensive metabolic panel; Future  •  Lipid Panel with Direct LDL reflex; Future  •  TSH, 3rd generation with Free T4 reflex; Future  •  UA (URINE) with reflex to Scope; Future    Healthcare maintenance  Discussed hepatitis C and HIV screening, order placed  Orders:  •  CBC; Future  •  Comprehensive metabolic panel; Future  •  Lipid Panel with Direct LDL reflex; Future  •  TSH, 3rd generation with Free T4 reflex; Future  •  UA (URINE) with reflex to Scope; Future    Seasonal allergies  Astelin nasal spray prescribed  Orders:  •  azelastine (ASTELIN) 0.1 % nasal spray; 2 sprays into each nostril 2 (two) times a day Use in each nostril as directed  •  methylPREDNISolone 4 MG tablet therapy pack; Use as directed on package  •  CBC; Future  •  Comprehensive metabolic panel; Future  •  Lipid Panel with Direct LDL  reflex; Future  •  TSH, 3rd generation with Free T4 reflex; Future  •  UA (URINE) with reflex to Scope; Future    Severe obesity (HCC)   BMI 35.57 patient continues to lose weight patient is lost well over 100 pounds so far.  Orders:  •  CBC; Future  •  Comprehensive metabolic panel; Future  •  Lipid Panel with Direct LDL reflex; Future  •  TSH, 3rd generation with Free T4 reflex; Future  •  UA (URINE) with reflex to Scope; Future    Acute suppurative otitis media of left ear without spontaneous rupture of tympanic membrane, recurrence not specified    Orders:  •  azithromycin (ZITHROMAX) 250 mg tablet; Take 2 tablets today then 1 tablet daily till finished  •  methylPREDNISolone 4 MG tablet therapy pack; Use as directed on package  •  CBC; Future  •  Comprehensive metabolic panel; Future  •  Lipid Panel with Direct LDL reflex; Future  •  TSH, 3rd generation with Free T4 reflex; Future  •  UA (URINE) with reflex to Scope; Future    Dysfunction of Eustachian tube, unspecified laterality    Orders:  •  methylPREDNISolone 4 MG tablet therapy pack; Use as directed on package  •  CBC; Future  •  Comprehensive metabolic panel; Future  •  Lipid Panel with Direct LDL reflex; Future  •  TSH, 3rd generation with Free T4 reflex; Future  •  UA (URINE) with reflex to Scope; Future    Screening for lipid disorders    Orders:  •  CBC; Future  •  Comprehensive metabolic panel; Future  •  Lipid Panel with Direct LDL reflex; Future  •  TSH, 3rd generation with Free T4 reflex; Future  •  UA (URINE) with reflex to Scope; Future    Need for hepatitis C screening test    Orders:  •  Hepatitis C Antibody; Future  •  CBC; Future  •  Comprehensive metabolic panel; Future  •  Lipid Panel with Direct LDL reflex; Future  •  TSH, 3rd generation with Free T4 reflex; Future  •  UA (URINE) with reflex to Scope; Future    Screening for HIV (human immunodeficiency virus)    Orders:  •  HIV 1/2 AG/AB w Reflex [facility] for 2 yr old and above; Future  •   "CBC; Future  •  Comprehensive metabolic panel; Future  •  Lipid Panel with Direct LDL reflex; Future  •  TSH, 3rd generation with Free T4 reflex; Future  •  UA (URINE) with reflex to Scope; Future           History of Present Illness   Patient is here to establish himself in this office.  History was discussed with the patient and his girlfriend who is a nurse.  Patient has seasonal allergies.  Using sporadic Flonase Mucinex D and oral antihistamines with limited relief.  Patient is a smoker.  Patient has lost well over 100 pounds at this juncture through diet and exercise      Review of Systems   Constitutional:  Negative for chills and fever.   HENT:          Head congestion mild left otalgia   Eyes: Negative.    Respiratory: Negative.     Cardiovascular: Negative.    Gastrointestinal:         Discussed CT from 2021.  Abnormality of rectosigmoid thickening per radiology and direct visualization was recommended patient has not followed up on this.   Endocrine: Negative.    Genitourinary: Negative.    Musculoskeletal: Negative.    Skin: Negative.    Allergic/Immunologic: Positive for environmental allergies.   Neurological: Negative.    Hematological: Negative.    Psychiatric/Behavioral: Negative.         Objective   /72 (BP Location: Left arm, Patient Position: Sitting, Cuff Size: Standard)   Pulse 73   Ht 5' 11\" (1.803 m)   Wt 116 kg (255 lb)   SpO2 96%   BMI 35.57 kg/m²      Physical Exam  Vitals and nursing note reviewed.   Constitutional:       Appearance: Normal appearance. He is obese.   HENT:      Head: Normocephalic and atraumatic.      Ears:      Comments: Both tympanic membranes are dull left TM with mild injection.  Positive cerumen bilateral but not obstructing     Nose:      Comments: Positive allergic turbinate negative sinus tenderness to percussion     Mouth/Throat:      Comments: Clear postnasal drip negative pharyngeal injection or exudate  Eyes:      General: No scleral icterus.        " Right eye: No discharge.         Left eye: No discharge.      Extraocular Movements: Extraocular movements intact.      Conjunctiva/sclera: Conjunctivae normal.      Pupils: Pupils are equal, round, and reactive to light.   Neck:      Vascular: No carotid bruit.   Cardiovascular:      Rate and Rhythm: Normal rate and regular rhythm.      Heart sounds: Normal heart sounds.   Pulmonary:      Effort: Pulmonary effort is normal.      Breath sounds: Normal breath sounds.   Abdominal:      General: Bowel sounds are normal. There is no distension.      Palpations: Abdomen is soft. There is no mass.      Tenderness: There is no abdominal tenderness. There is no right CVA tenderness, left CVA tenderness, guarding or rebound.      Hernia: No hernia is present.   Musculoskeletal:      Cervical back: Neck supple.      Right lower leg: No edema.      Left lower leg: No edema.   Lymphadenopathy:      Cervical: No cervical adenopathy.   Skin:     General: Skin is warm and dry.   Neurological:      General: No focal deficit present.      Mental Status: He is alert and oriented to person, place, and time.      Cranial Nerves: No cranial nerve deficit.   Psychiatric:         Mood and Affect: Mood normal.         Behavior: Behavior normal.         Thought Content: Thought content normal.         Judgment: Judgment normal.

## 2025-04-02 NOTE — ASSESSMENT & PLAN NOTE
BMI 35.57 patient continues to lose weight patient is lost well over 100 pounds so far.  Orders:  •  CBC; Future  •  Comprehensive metabolic panel; Future  •  Lipid Panel with Direct LDL reflex; Future  •  TSH, 3rd generation with Free T4 reflex; Future  •  UA (URINE) with reflex to Scope; Future

## 2025-04-13 ENCOUNTER — APPOINTMENT (OUTPATIENT)
Dept: RADIOLOGY | Facility: CLINIC | Age: 35
End: 2025-04-13
Attending: PHYSICIAN ASSISTANT
Payer: COMMERCIAL

## 2025-04-13 ENCOUNTER — OFFICE VISIT (OUTPATIENT)
Dept: URGENT CARE | Facility: CLINIC | Age: 35
End: 2025-04-13
Payer: COMMERCIAL

## 2025-04-13 VITALS
RESPIRATION RATE: 18 BRPM | OXYGEN SATURATION: 96 % | BODY MASS INDEX: 36.37 KG/M2 | WEIGHT: 259.8 LBS | DIASTOLIC BLOOD PRESSURE: 78 MMHG | HEART RATE: 80 BPM | HEIGHT: 71 IN | TEMPERATURE: 97.9 F | SYSTOLIC BLOOD PRESSURE: 122 MMHG

## 2025-04-13 DIAGNOSIS — M25.562 ACUTE PAIN OF LEFT KNEE: ICD-10-CM

## 2025-04-13 DIAGNOSIS — M70.52 INFRAPATELLAR BURSITIS OF LEFT KNEE: Primary | ICD-10-CM

## 2025-04-13 PROCEDURE — 73564 X-RAY EXAM KNEE 4 OR MORE: CPT

## 2025-04-13 PROCEDURE — 99213 OFFICE O/P EST LOW 20 MIN: CPT | Performed by: PHYSICIAN ASSISTANT

## 2025-04-13 RX ORDER — PREDNISONE 20 MG/1
40 TABLET ORAL DAILY
Qty: 10 TABLET | Refills: 0 | Status: SHIPPED | OUTPATIENT
Start: 2025-04-13 | End: 2025-04-18

## 2025-04-13 NOTE — PROGRESS NOTES
[facility]        NAME: Antwan Lugo is a 34 y.o. male  : 1990    MRN: [id number]  DATE: 2025  TIME: 11:21 AM    Assessment and Plan   Acute pain of left knee [M25.562]  1. Acute pain of left knee  XR knee 4+ vw left injury            Patient Instructions       Follow up with PCP in 3-5 days.  Proceed to  ER if symptoms worsen.    If tests have been performed at [facility], our office will contact you with results if changes need to be made to the care plan discussed with you at the visit.  You can review your full results on [facility].    Chief Complaint     Chief Complaint   Patient presents with    Knee Pain     Started Friday with irritation when bending his left knee, concerned for tendonitis, swelling, tender to the touch, denies any specific injury, denies fever, and he's been managing with Aleve          History of Present Illness       Patient presents with left knee swelling and pain when bending it starting 2 days ago. Has gotten progressively worse.   Denies injury or event to start the symptoms, fevers, numbness/tingling, bruising, erythema/color changes, calf pain/swelling, numbness/tingling, fever.    Knee Pain   Pertinent negatives include no numbness.       Review of Systems   Review of Systems   Constitutional:  Negative for fever.   Musculoskeletal:  Positive for arthralgias and joint swelling.   Skin:  Negative for color change and rash.   Neurological:  Negative for weakness and numbness.         Current Medications       Current Outpatient Medications:     azelastine (ASTELIN) 0.1 % nasal spray, 2 sprays into each nostril 2 (two) times a day Use in each nostril as directed, Disp: 30 mL, Rfl: 3    Current Allergies     Allergies as of 2025    (No Known Allergies)            The following portions of the patient's history were reviewed and updated as appropriate: allergies, current medications, past family history, past medical history, past social  "history, past surgical history and problem list.     Past Medical History:   Diagnosis Date    Diverticulitis     Diverticulitis of colon     Inflammatory bowel disease     No known problems        Past Surgical History:   Procedure Laterality Date    FRACTURE SURGERY         Family History   Problem Relation Age of Onset    Rheum arthritis Mother     Coronary artery disease Father     Dementia Maternal Grandfather          Medications have been verified.        Objective   /78 (BP Location: Left arm, Patient Position: Standing, Cuff Size: Large)   Pulse 80   Temp 97.9 °F (36.6 °C) (Tympanic)   Resp 18   Ht 5' 11\" (1.803 m)   Wt 118 kg (259 lb 12.8 oz)   SpO2 96%   BMI 36.23 kg/m²   No LMP for male patient.       Physical Exam     Physical Exam  Constitutional:       Appearance: Normal appearance.   Musculoskeletal:         General: Swelling and tenderness present. No deformity.      Right lower leg: No edema.      Left lower leg: No edema.      Comments: Tenderness to palpation and localized swelling over the anterior patella and just distal to the patella.  No erythema, abrasions/breaks in the skin, bruising noted.  No calf pain or tenderness.  Homans' sign negative.  No swelling distally.  Active range of motion of the knee limited due to pain but can initiate partial flexion and extension.  Full active range of motion 5 out of 5 muscle strength of the foot/ankle/hip.   Skin:     Findings: No bruising, erythema or rash.   Neurological:      Mental Status: He is alert.   Psychiatric:         Mood and Affect: Mood normal.         Behavior: Behavior normal.                   "

## 2025-04-13 NOTE — LETTER
April 13, 2025     Patient: Antwan Lugo  YOB: 1990  Date of Visit: 4/13/2025      To Whom it May Concern:    Antwan Lugo is under my professional care. Antwan was seen in my office on 4/13/2025. Please excuse Antwan for 4/13, 4/14, and 4/15/2025.     If you have any questions or concerns, please don't hesitate to call.         Sincerely,          BE SHANICE COHEN        CC: No Recipients

## 2025-04-17 ENCOUNTER — TELEPHONE (OUTPATIENT)
Age: 35
End: 2025-04-17

## 2025-04-17 NOTE — TELEPHONE ENCOUNTER
Referral was put in by urgent care.         Patient is requesting an insurance referral for the following specialty:      Test Name / Order Name: Consultation     DX Code: M25.562    Date Of Service: 04/15/2025    Location/Facility Name/Address/Phone #: [facility] [address] [place] [address]  Phone [phone]  Fax [phone]    Location / Facility NPI: [id number]    Best Phone # To Reach The Patient:

## 2025-04-17 NOTE — TELEPHONE ENCOUNTER
Patient called and needs a referral to orthopedics.  He could noLehit get into us soon, so he went to .  They advised him to got to Ortho.  He called the one closest to him( [facility]) and they will need a referral - ambulatory and Ins referral.

## 2025-04-21 ENCOUNTER — OFFICE VISIT (OUTPATIENT)
Dept: FAMILY MEDICINE CLINIC | Facility: CLINIC | Age: 35
End: 2025-04-21
Payer: COMMERCIAL

## 2025-04-21 VITALS
WEIGHT: 263 LBS | OXYGEN SATURATION: 97 % | DIASTOLIC BLOOD PRESSURE: 86 MMHG | RESPIRATION RATE: 20 BRPM | HEART RATE: 95 BPM | SYSTOLIC BLOOD PRESSURE: 130 MMHG | HEIGHT: 71 IN | BODY MASS INDEX: 36.82 KG/M2

## 2025-04-21 DIAGNOSIS — M25.462 KNEE EFFUSION, LEFT: ICD-10-CM

## 2025-04-21 DIAGNOSIS — R26.9 ABNORMALITY OF GAIT: ICD-10-CM

## 2025-04-21 DIAGNOSIS — M76.52 PATELLAR TENDINITIS OF LEFT KNEE: ICD-10-CM

## 2025-04-21 DIAGNOSIS — M25.562 ACUTE PAIN OF LEFT KNEE: Primary | ICD-10-CM

## 2025-04-21 DIAGNOSIS — M70.52 INFRAPATELLAR BURSITIS OF LEFT KNEE: ICD-10-CM

## 2025-04-21 PROCEDURE — 99214 OFFICE O/P EST MOD 30 MIN: CPT | Performed by: FAMILY MEDICINE

## 2025-04-21 RX ORDER — NAPROXEN 500 MG/1
500 TABLET ORAL 2 TIMES DAILY WITH MEALS
COMMUNITY
Start: 2025-04-15

## 2025-04-21 NOTE — LETTER
April 21, 2025     Patient: Antwan Lugo   YOB: 1990   Date of Visit: 4/21/2025       To Whom It May Concern:    It is my medical opinion that Antwan Lugo may return to work on 5/28/2025 .    If you have any questions or concerns, please don't hesitate to call.         Sincerely,        Tavares Brand,     CC: No Recipients

## 2025-04-21 NOTE — PATIENT INSTRUCTIONS
Patient continue naproxen 500 mg twice daily with meals  Complete MRI of left knee  Follow-up physical therapy  Follow with orthopedics  I will give you a work excuse till next Monday and follow with orthopedics

## 2025-04-21 NOTE — PROGRESS NOTES
Name: Antwan Lugo      : 1990      MRN: [id number]  Encounter Provider: Tavares Brand DO  Encounter Date: 2025   Encounter department: [facility]  Return after orthopedic evaluation if needed  :  Assessment & Plan  Acute pain of left knee  Pain is worsening.  [facility] did not schedule follow-up patient like second opinion will refer to [facility] orthopedics  Continue naproxen  Orders:    Ambulatory Referral to Physical Therapy; Future    MRI knee left  wo contrast; Future    Ambulatory Referral to Orthopedic Surgery; Future    Knee effusion, left  MRI as ordered  Physical therapy as ordered  Refer to orthopedics  Orders:    Ambulatory Referral to Physical Therapy; Future    MRI knee left  wo contrast; Future    Ambulatory Referral to Orthopedic Surgery; Future    Patellar tendinitis of left knee  As above  Orders:    Ambulatory Referral to Physical Therapy; Future    MRI knee left  wo contrast; Future    Ambulatory Referral to Orthopedic Surgery; Future    Infrapatellar bursitis of left knee  Consult orthopedics  As above    Orders:    Ambulatory Referral to Physical Therapy; Future    MRI knee left  wo contrast; Future    Ambulatory Referral to Orthopedic Surgery; Future    Abnormality of gait  As above              History of Present Illness   Knee pain for the past 2 weeks.  Patient denies any antecedent trauma to it.  On the  patient saw urgent care x-rays were negative.  On the  patient saw [facility] orthopedics, Dr. Kaufman, told he had tendinitis of the patellar tendon giving Naprosyn and told to return as needed.  Patient still with consider amount of pain there is some fluid decreased range of motion at this point.      Review of Systems   Constitutional: Negative.    HENT: Negative.     Eyes: Negative.    Respiratory: Negative.     Cardiovascular: Negative.    Gastrointestinal: Negative.    Endocrine: Negative.    Genitourinary: Negative.    Musculoskeletal:   "       HPI   Skin: Negative.    Allergic/Immunologic: Negative.    Neurological: Negative.    Hematological: Negative.    Psychiatric/Behavioral: Negative.         Objective   /86 (BP Location: Right arm, Patient Position: Sitting, Cuff Size: Large)   Pulse 95   Resp 20   Ht 5' 11\" (1.803 m)   Wt 119 kg (263 lb)   SpO2 97%   BMI 36.68 kg/m²      Physical Exam  Vitals and nursing note reviewed.   Constitutional:       Appearance: Normal appearance. He is obese.   HENT:      Head: Normocephalic and atraumatic.   Eyes:      Conjunctiva/sclera: Conjunctivae normal.   Cardiovascular:      Rate and Rhythm: Normal rate and regular rhythm.      Heart sounds: Normal heart sounds.   Pulmonary:      Effort: Pulmonary effort is normal.      Breath sounds: Normal breath sounds.   Musculoskeletal:         General: Swelling and tenderness present. No deformity.      Right lower leg: No edema.      Left lower leg: No edema.      Comments: Left knee decreased range of motion with extension and flexion.  Positive effusion.  Positive tenderness negative warmth or induration.  Negative gross deformity   Skin:     General: Skin is warm and dry.   Neurological:      General: No focal deficit present.      Mental Status: He is alert.      Gait: Gait abnormal.      Comments: Positive antalgic gait   Psychiatric:         Mood and Affect: Mood normal.         "

## 2025-04-21 NOTE — ASSESSMENT & PLAN NOTE
Consult orthopedics  As above    Orders:    Ambulatory Referral to Physical Therapy; Future    MRI knee left  wo contrast; Future    Ambulatory Referral to Orthopedic Surgery; Future    
(4) no impairment

## 2025-04-28 ENCOUNTER — HOSPITAL ENCOUNTER (OUTPATIENT)
Dept: MRI IMAGING | Facility: HOSPITAL | Age: 35
Discharge: HOME | End: 2025-04-28
Payer: COMMERCIAL

## 2025-04-28 ENCOUNTER — RESULTS FOLLOW-UP (OUTPATIENT)
Dept: FAMILY MEDICINE CLINIC | Facility: CLINIC | Age: 35
End: 2025-04-28

## 2025-04-28 DIAGNOSIS — M76.52 PATELLAR TENDINITIS OF LEFT KNEE: ICD-10-CM

## 2025-04-28 DIAGNOSIS — M70.52 INFRAPATELLAR BURSITIS OF LEFT KNEE: ICD-10-CM

## 2025-04-28 DIAGNOSIS — M25.462 KNEE EFFUSION, LEFT: ICD-10-CM

## 2025-04-28 DIAGNOSIS — M25.562 ACUTE PAIN OF LEFT KNEE: ICD-10-CM

## 2025-04-28 PROCEDURE — 73721 MRI JNT OF LWR EXTRE W/O DYE: CPT

## 2025-04-29 DIAGNOSIS — M25.562 ACUTE PAIN OF LEFT KNEE: Primary | ICD-10-CM

## 2025-04-30 ENCOUNTER — OFFICE VISIT (OUTPATIENT)
Dept: FAMILY MEDICINE CLINIC | Facility: CLINIC | Age: 35
End: 2025-04-30
Payer: COMMERCIAL

## 2025-04-30 ENCOUNTER — APPOINTMENT (OUTPATIENT)
Dept: RADIOLOGY | Facility: CLINIC | Age: 35
End: 2025-04-30
Payer: COMMERCIAL

## 2025-04-30 VITALS
WEIGHT: 260 LBS | BODY MASS INDEX: 36.4 KG/M2 | DIASTOLIC BLOOD PRESSURE: 68 MMHG | HEIGHT: 71 IN | HEART RATE: 86 BPM | SYSTOLIC BLOOD PRESSURE: 128 MMHG | OXYGEN SATURATION: 96 %

## 2025-04-30 DIAGNOSIS — S86.912D: Primary | ICD-10-CM

## 2025-04-30 DIAGNOSIS — E66.01 SEVERE OBESITY (HCC): ICD-10-CM

## 2025-04-30 DIAGNOSIS — M17.12 PRIMARY OSTEOARTHRITIS OF LEFT KNEE: ICD-10-CM

## 2025-04-30 DIAGNOSIS — K63.9 MURAL THICKENING OF SIGMOID COLON: ICD-10-CM

## 2025-04-30 DIAGNOSIS — R93.5 ABNORMAL CT OF THE ABDOMEN: ICD-10-CM

## 2025-04-30 DIAGNOSIS — J30.2 SEASONAL ALLERGIES: ICD-10-CM

## 2025-04-30 DIAGNOSIS — M25.562 ACUTE PAIN OF LEFT KNEE: ICD-10-CM

## 2025-04-30 DIAGNOSIS — Z72.0 NICOTINE ABUSE: ICD-10-CM

## 2025-04-30 PROBLEM — S86.912A: Status: ACTIVE | Noted: 2025-04-30

## 2025-04-30 PROBLEM — M70.52 INFRAPATELLAR BURSITIS OF LEFT KNEE: Status: RESOLVED | Noted: 2025-04-13 | Resolved: 2025-04-30

## 2025-04-30 PROCEDURE — 73560 X-RAY EXAM OF KNEE 1 OR 2: CPT

## 2025-04-30 PROCEDURE — 99214 OFFICE O/P EST MOD 30 MIN: CPT | Performed by: FAMILY MEDICINE

## 2025-04-30 NOTE — PATIENT INSTRUCTIONS
Follow with orthopedics, Dr. Cotton, tomorrow as planned  Complete blood work in the next few weeks  Continue diet exercise weight loss  Return in 6 months sooner if needed

## 2025-04-30 NOTE — ASSESSMENT & PLAN NOTE
Rina Keene is here with diarrhea and weakness and not feeling well all day.   Stable using Astelin as needed

## 2025-04-30 NOTE — PROGRESS NOTES
"Name: Antwan Lugo      : 1990      MRN: [id number]  Encounter Provider: Tavares Brand DO  Encounter Date: 2025   Encounter department: [facility]  Return in 6 months  Complete blood work as ordered in the future  :  Assessment & Plan  Tear of tendon of left lower extremity, subsequent encounter  Patient to follow with orthopedics, Dr. Ctoton, tomorrow as planned       Primary osteoarthritis of left knee  Continue naproxen, patient is upcoming appointment with orthopedic, Dr. Cotton       Nicotine abuse  I recommend discontinuation of all nicotine products       Abnormal CT of the abdomen  Patient to follow with colorectal specialist, Dr. Ayan Piña       Mural thickening of sigmoid colon  Patient to follow with colorectal specialist, Dr. Ayan Piña       Seasonal allergies  Stable using Astelin as needed       Severe obesity (HCC)   BMI 36.26 patient has lost 3 pounds continue diet exercise weight loss              History of Present Illness   MRI was discussed with the patient.  1 patient sitting and not weightbearing pain is just \"an achy 2\".  But when patient stands it goes up to 8-9.  Patient is unable to work he is a  he tried Monday and just could not tolerate it.  Patient does upcoming appointment tomorrow with orthopedics, Dr. Cotton      Review of Systems   Constitutional: Negative.    HENT: Negative.     Eyes: Negative.    Respiratory: Negative.     Cardiovascular: Negative.    Gastrointestinal: Negative.    Endocrine: Negative.    Genitourinary: Negative.    Musculoskeletal:         HPI   Skin: Negative.    Allergic/Immunologic: Negative.    Neurological: Negative.    Hematological: Negative.    Psychiatric/Behavioral: Negative.         Objective   /68 (BP Location: Right arm, Patient Position: Sitting, Cuff Size: Standard)   Pulse 86   Ht 5' 11\" (1.803 m)   Wt 118 kg (260 lb)   SpO2 96%   BMI 36.26 kg/m²      Physical Exam  Vitals and nursing note " reviewed.   Constitutional:       Appearance: Normal appearance.   HENT:      Head: Normocephalic and atraumatic.      Mouth/Throat:      Mouth: Mucous membranes are moist.   Eyes:      General: No scleral icterus.  Cardiovascular:      Rate and Rhythm: Normal rate and regular rhythm.      Heart sounds: Normal heart sounds.   Pulmonary:      Effort: Pulmonary effort is normal.      Breath sounds: Normal breath sounds.   Musculoskeletal:         General: Tenderness present. No deformity.      Right lower leg: No edema.      Left lower leg: No edema.      Comments: Decreased range of motion of left knee   Skin:     General: Skin is warm and dry.   Neurological:      General: No focal deficit present.      Mental Status: He is alert.   Psychiatric:         Mood and Affect: Mood normal.

## 2025-05-01 ENCOUNTER — OFFICE VISIT (OUTPATIENT)
Dept: OBGYN CLINIC | Facility: CLINIC | Age: 35
End: 2025-05-01
Payer: COMMERCIAL

## 2025-05-01 VITALS — BODY MASS INDEX: 36.26 KG/M2 | HEIGHT: 71 IN | WEIGHT: 259 LBS

## 2025-05-01 DIAGNOSIS — M70.52 INFRAPATELLAR BURSITIS OF LEFT KNEE: ICD-10-CM

## 2025-05-01 DIAGNOSIS — M17.32 POST-TRAUMATIC OSTEOARTHRITIS OF LEFT KNEE: ICD-10-CM

## 2025-05-01 DIAGNOSIS — M25.462 KNEE EFFUSION, LEFT: ICD-10-CM

## 2025-05-01 DIAGNOSIS — M76.52 PATELLAR TENDINITIS OF LEFT KNEE: Primary | ICD-10-CM

## 2025-05-01 DIAGNOSIS — M25.562 ACUTE PAIN OF LEFT KNEE: ICD-10-CM

## 2025-05-01 PROCEDURE — 99204 OFFICE O/P NEW MOD 45 MIN: CPT | Performed by: ORTHOPAEDIC SURGERY

## 2025-05-01 NOTE — LETTER
May 1, 2025     Patient: Antwan Lugo  YOB: 1990  Date of Visit: 5/1/2025      To Whom it May Concern:    Antwan Lugo is under my professional care. Antwan was seen in my office on 5/1/2025. Antwan has medical excuse for missed time at work 5/1/25-5/4/25. He can return to work 5/5/25 with knee immobilizer. Please allow to rest as needed.     If you have any questions or concerns, please don't hesitate to call.         Sincerely,          Amparo Cotton MD        CC: No Recipients

## 2025-05-01 NOTE — ASSESSMENT & PLAN NOTE
Findings consistent with left knee patella tendonitis and moderate post traumatic patellofemoral osteoarthritis.  Left knee x-ray and MRI reviewed with patient. Prognosis of his condition reviewed today. Conservative treatment recommended at this time. MRI did show changes in the patella tendon most consistent with ganglion cyst within tendon. Patient likely had patella sleeve fracture when he was younger causing post traumatic patellofemoral changes with cyst proximal portion of tendon. At this time patient will be placed in a knee immobilizer to calm the inflammation. He can remove to shower or when resting. Instructed not to bend his knee or start physical therapy until his symptoms calm down in 2 to 3 weeks. He has referral for physical therapy which he can start if symptoms have improved prior to next appt in 6 weeks. Work note given to return on Monday with immobilizer. If any issues he can call for further restrictions. Naproxen, topical voltaren gel for pain control. All patient's questions were answered to his satisfaction.  This note is created using dictation transcription.  It may contain typographical errors, grammatical errors, improperly dictated words, background noise and other errors.

## 2025-05-01 NOTE — PROGRESS NOTES
Assessment:     1. Patellar tendinitis of left knee    2. Post-traumatic osteoarthritis of left knee    3. Acute pain of left knee    4. Knee effusion, left    5. Infrapatellar bursitis of left knee        Plan:     Problem List Items Addressed This Visit          Musculoskeletal and Integument    Patellar tendinitis of left knee - Primary    Findings consistent with left knee patella tendonitis and moderate post traumatic patellofemoral osteoarthritis.  Left knee x-ray and MRI reviewed with patient. Prognosis of his condition reviewed today. Conservative treatment recommended at this time. MRI did show changes in the patella tendon most consistent with ganglion cyst within tendon. Patient likely had patella sleeve fracture when he was younger causing post traumatic patellofemoral changes with cyst proximal portion of tendon. At this time patient will be placed in a knee immobilizer to calm the inflammation. He can remove to shower or when resting. Instructed not to bend his knee or start physical therapy until his symptoms calm down in 2 to 3 weeks. He has referral for physical therapy which he can start if symptoms have improved prior to next appt in 6 weeks. Work note given to return on Monday with immobilizer. If any issues he can call for further restrictions. Naproxen, topical voltaren gel for pain control. All patient's questions were answered to his satisfaction.  This note is created using dictation transcription.  It may contain typographical errors, grammatical errors, improperly dictated words, background noise and other errors.         Relevant Orders    Knee Immobilizer    Ambulatory Referral to Physical Therapy    Post-traumatic osteoarthritis of left knee    Relevant Orders    Ambulatory Referral to Physical Therapy     Other Visit Diagnoses         Acute pain of left knee          Knee effusion, left          Infrapatellar bursitis of left knee               Subjective:     Patient ID: Antwan  Parish is a 35 y.o. male.  Chief Complaint:  35 yr old male in for evaluation of left knee pain.  Patient referred here by his PCP, Dr. Brand.  Patient has MRI showing interstitial tear length of patella tendon. He initially saw urgent care and then followed with Dr Kaufman at [facility] 4/15/25 who diagnosed with patella tendonitis and gave patient patella tendon strap along with medrol dose bruce and naproxen. He also has voltaren gel.  Pain continued patient then went to PCP and got MRI due to worsening pain. He states he is a  and stands, walks, bends, kneels for long hours. He states around 2 weeks ago he woke up Friday morning with pain and difficulty bending his knee. Pain is anterior over patella and patella tendon. He likes to keep knee extended for comfort any type of bending is painful. History of patella fracture when he was 9 years old which required surgery.     Allergy:  No Known Allergies  Medications:  all current active meds have been reviewed  Past Medical History:  Past Medical History:   Diagnosis Date    Diverticulitis     Diverticulitis of colon     Inflammatory bowel disease     No known problems      Past Surgical History:  Past Surgical History:   Procedure Laterality Date    FRACTURE SURGERY       Family History:  Family History   Problem Relation Age of Onset    Rheum arthritis Mother     Coronary artery disease Father     Dementia Maternal Grandfather      Social History:  Social History     Substance and Sexual Activity   Alcohol Use Yes    Alcohol/week: 3.0 - 5.0 standard drinks of alcohol    Types: 3 - 5 Standard drinks or equivalent per week    Comment: socially      Social History     Substance and Sexual Activity   Drug Use No     Social History     Tobacco Use   Smoking Status Former    Current packs/day: 1.00    Average packs/day: 1 pack/day for 10.0 years (10.0 ttl pk-yrs)    Types: Cigarettes    Passive exposure: Never   Smokeless Tobacco Never   Tobacco Comments    Patient  "is vaping     Review of Systems   Constitutional:  Negative for chills and fever.   HENT:  Negative for ear pain and sore throat.    Eyes:  Negative for pain and visual disturbance.   Respiratory:  Negative for cough and shortness of breath.    Cardiovascular:  Negative for chest pain and palpitations.   Gastrointestinal:  Negative for abdominal pain and vomiting.   Genitourinary:  Negative for dysuria and hematuria.   Musculoskeletal:  Positive for arthralgias (left knee), gait problem (Antalgic) and joint swelling (Left knee). Negative for back pain.   Skin:  Negative for color change and rash.   Neurological:  Negative for seizures and syncope.   Psychiatric/Behavioral: Negative.     All other systems reviewed and are negative.        Objective:  BP Readings from Last 1 Encounters:   04/30/25 128/68      Wt Readings from Last 1 Encounters:   05/01/25 117 kg (259 lb)      BMI:   Estimated body mass index is 36.12 kg/m² as calculated from the following:    Height as of this encounter: 5' 11\" (1.803 m).    Weight as of this encounter: 117 kg (259 lb).  BSA:   Estimated body surface area is 2.35 meters squared as calculated from the following:    Height as of this encounter: 5' 11\" (1.803 m).    Weight as of this encounter: 117 kg (259 lb).   Physical Exam  Vitals and nursing note reviewed.   Constitutional:       Appearance: Normal appearance. He is well-developed.   HENT:      Head: Normocephalic and atraumatic.      Right Ear: External ear normal.      Left Ear: External ear normal.   Eyes:      Extraocular Movements: Extraocular movements intact.      Conjunctiva/sclera: Conjunctivae normal.   Pulmonary:      Effort: Pulmonary effort is normal.   Musculoskeletal:         General: Tenderness (left knee arthralgia) present.      Cervical back: Neck supple.      Left knee: No effusion.      Instability Tests: Medial Katlyn test negative and lateral Katlyn test negative.   Skin:     General: Skin is warm and dry. "   Neurological:      Mental Status: He is alert and oriented to person, place, and time.      Deep Tendon Reflexes: Reflexes are normal and symmetric.   Psychiatric:         Mood and Affect: Mood normal.         Behavior: Behavior normal.       Left Knee Exam     Muscle Strength   The patient has normal left knee strength.    Tenderness   Left knee tenderness location: Patella insertion at the tibial tuberosity.    Range of Motion   Extension:  0   Flexion:  100 (limited by pain)     Tests   Katlyn:  Medial - negative Lateral - negative  Varus: negative Valgus: negative  Lachman:  Anterior - negative        Other   Erythema: absent  Scars: present (Well-healed anterior incision)  Sensation: normal  Pulse: present  Swelling: none  Effusion: no effusion present    Comments:  Pain with active extension            I have personally reviewed pertinent films in PACS and my interpretation is x-ray left knee degenerative changes patellofemoral compartment with bony irregularity due to previous patella fracture, MRI left knee cyst with proximal portion of patella tendon with thinning and fissuring of the patella articular surface.    Scribe Attestation      I,:  Romeo Powell am acting as a scribe while in the presence of the attending physician.:       I,:  Amparo Cotton MD personally performed the services described in this documentation    as scribed in my presence.:

## 2025-05-02 PROBLEM — Z00.00 HEALTHCARE MAINTENANCE: Status: RESOLVED | Noted: 2025-04-02 | Resolved: 2025-05-02

## 2025-05-20 ENCOUNTER — OFFICE VISIT (OUTPATIENT)
Dept: FAMILY MEDICINE CLINIC | Facility: CLINIC | Age: 35
End: 2025-05-20
Payer: COMMERCIAL

## 2025-05-20 VITALS
SYSTOLIC BLOOD PRESSURE: 128 MMHG | HEART RATE: 78 BPM | OXYGEN SATURATION: 95 % | WEIGHT: 260.2 LBS | HEIGHT: 71 IN | DIASTOLIC BLOOD PRESSURE: 80 MMHG | BODY MASS INDEX: 36.43 KG/M2

## 2025-05-20 DIAGNOSIS — M17.12 PRIMARY OSTEOARTHRITIS OF LEFT KNEE: ICD-10-CM

## 2025-05-20 DIAGNOSIS — R26.9 GAIT DIFFICULTY: ICD-10-CM

## 2025-05-20 DIAGNOSIS — S86.912D: ICD-10-CM

## 2025-05-20 DIAGNOSIS — M76.52 PATELLAR TENDINITIS OF LEFT KNEE: Primary | ICD-10-CM

## 2025-05-20 PROCEDURE — 99214 OFFICE O/P EST MOD 30 MIN: CPT | Performed by: FAMILY MEDICINE

## 2025-05-20 RX ORDER — DICLOFENAC SODIUM 75 MG/1
75 TABLET, DELAYED RELEASE ORAL 2 TIMES DAILY
Qty: 60 TABLET | Refills: 3 | Status: SHIPPED | OUTPATIENT
Start: 2025-05-20

## 2025-05-20 NOTE — PROGRESS NOTES
Name: Antwan Lugo      : 1990      MRN: [id number]  Encounter Provider: Tavares Brand DO  Encounter Date: 2025   Encounter department: [facility]  :  Assessment & Plan  Patellar tendinitis of left knee  Discontinue Naprosyn changed to diclofenac refer back to orthopedics  Orders:  •  Ambulatory Referral to Physical Therapy; Future  •  diclofenac (VOLTAREN) 75 mg EC tablet; Take 1 tablet (75 mg total) by mouth 2 (two) times a day    Primary osteoarthritis of left knee  As above  Orders:  •  Ambulatory Referral to Physical Therapy; Future  •  diclofenac (VOLTAREN) 75 mg EC tablet; Take 1 tablet (75 mg total) by mouth 2 (two) times a day    Tear of tendon of left lower extremity, subsequent encounter  As above  Orders:  •  Ambulatory Referral to Physical Therapy; Future  •  diclofenac (VOLTAREN) 75 mg EC tablet; Take 1 tablet (75 mg total) by mouth 2 (two) times a day    Gait difficulty  As above  Orders:  •  Ambulatory Referral to Physical Therapy; Future           History of Present Illness   Is having more trouble with his knee.  It is getting more painful edition range of motion is decreasing.  Patient did see orthopedics.  Did not start physical therapy.  Patient stated he was told that he should be pain-free for a few weeks before starting therapy.  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    HENT: Negative.     Eyes: Negative.    Respiratory: Negative.     Cardiovascular: Negative.    Gastrointestinal: Negative.    Endocrine: Negative.    Genitourinary: Negative.    Musculoskeletal:         HPI   Skin: Negative.    Allergic/Immunologic: Negative.    Neurological: Negative.    Hematological: Negative.    Psychiatric/Behavioral: Negative.         Objective   /80 (BP Location: Right arm, Patient Position: Sitting, Cuff Size: Standard)   Pulse 78   " Ht 5' 11\" (1.803 m)   Wt 118 kg (260 lb 3.2 oz)   SpO2 95%   BMI 36.29 kg/m²      Physical Exam  Vitals and nursing note reviewed.   HENT:      Head: Normocephalic and atraumatic.      Mouth/Throat:      Mouth: Mucous membranes are moist.     Cardiovascular:      Rate and Rhythm: Normal rate and regular rhythm.      Heart sounds: Normal heart sounds.   Pulmonary:      Effort: Pulmonary effort is normal.      Breath sounds: Normal breath sounds.     Musculoskeletal:         General: Tenderness present. No swelling or deformity.      Cervical back: No rigidity.      Right lower leg: No edema.      Left lower leg: No edema.      Comments: Left knee significant decreased range of motion mild tenderness lateral aspects.  Negative gross deformity negative effusion     Skin:     General: Skin is warm and dry.     Neurological:      General: No focal deficit present.      Gait: Gait abnormal.      Comments: Antalgic gait   Psychiatric:         Mood and Affect: Mood normal.         "

## 2025-05-20 NOTE — PATIENT INSTRUCTIONS
Remain out of work may return to work 6/2/2025, Monday  Start physical therapy this week  Discontinue naproxen changed to diclofenac 75 twice daily  Follow with orthopedics

## 2025-05-20 NOTE — ASSESSMENT & PLAN NOTE
As above  Orders:  •  Ambulatory Referral to Physical Therapy; Future  •  diclofenac (VOLTAREN) 75 mg EC tablet; Take 1 tablet (75 mg total) by mouth 2 (two) times a day

## 2025-05-20 NOTE — ASSESSMENT & PLAN NOTE
Discontinue Naprosyn changed to diclofenac refer back to orthopedics  Orders:  •  Ambulatory Referral to Physical Therapy; Future  •  diclofenac (VOLTAREN) 75 mg EC tablet; Take 1 tablet (75 mg total) by mouth 2 (two) times a day

## 2025-05-20 NOTE — LETTER
May 20, 2025     Patient: Antwan Lugo   YOB: 1990   Date of Visit: 5/20/2025       To Whom It May Concern:    It is my medical opinion that Antwan Lugo may return to work on 6/2/2025.  Patient's first day out of work will be today, 5/20/2025.    If you have any questions or concerns, please don't hesitate to call.         Sincerely,        Tavares Brand DO    CC: No Recipients

## 2025-05-20 NOTE — Clinical Note
Patient seems to be greatly worsened.  Again I have sent him for physical therapy.  You do have an appointment the next few weeks could you possibly see him in the next week?

## 2025-05-22 ENCOUNTER — OFFICE VISIT (OUTPATIENT)
Dept: OBGYN CLINIC | Facility: MEDICAL CENTER | Age: 35
End: 2025-05-22

## 2025-05-22 VITALS — BODY MASS INDEX: 36.4 KG/M2 | WEIGHT: 260 LBS | HEIGHT: 71 IN

## 2025-05-22 DIAGNOSIS — M17.32 POST-TRAUMATIC OSTEOARTHRITIS OF LEFT KNEE: ICD-10-CM

## 2025-05-22 DIAGNOSIS — M76.50 PATELLAR TENDINOSIS: Primary | ICD-10-CM

## 2025-05-22 NOTE — PROGRESS NOTES
Orthopedics Sports Medicine Knee New Patient Visit    Name: Antwan Lugo      : 1990      MRN: [id number]  Encounter Provider: Chato Mendoza DO  Encounter Date: 2025   Encounter department: [facility]  :  Assessment & Plan  Post-traumatic osteoarthritis of left knee    Orders:    Ambulatory Referral to Physical Therapy; Future    Patellar tendinosis  Assesment:   35 y.o. male left knee severe interstitial patellar tendonesis with PF OA    Plan:    Conservative treatment:    MRI imaging was discussed at length with the patient.  Discussed at length with the patient that he does have arthritis of the patella as well as severe interstitial patellar tendinosis.  Instructed I would recommend that he perform conservative treatment formal physical therapy along with bracing, NSAIDs, rest and icing.  Instructed that it is possible that with the use of physical therapy that he could get improvement of his pain in the anterior aspect of the knee.  Instructed if after 6 weeks of formal physical therapy he does not have improvement then we can proceed forward with surgical intervention.  Discussed at length that the surgery would consist of a small open incision in which we would remove the part of the patellar tendon that is degenerative.  Ice to knee for 20 minutes at least 1-2 times daily.  PT for ROM/strengthening to knee, hip and core.  OTC NSAIDS prn for pain.    Imaging:    All imaging from today was reviewed by myself and explained to the patient.       Injection:    No Injection planned at this time.      Surgery:     No surgery is recommended at this point, continue with conservative treatment plan as noted.      Follow up:    Return in about 6 weeks (around 7/3/2025) for Recheck.      Orders:    Ambulatory Referral to Physical Therapy; Future          History of Present Illness   HPI  Chief Complaint   Patient presents with    Left Knee - Pain       History of  Present Illness:    The patient is a 35 y.o. male whose occupation is , referred to me by their primary care physician, seen in clinic for consultation of left knee pain.      Pain is located anterior.  The patient rates the pain as a 1/10 at rest and 10/10 to palpation.  The pain has been present for 6 weeks.      The patient denies specific injury but has been having pain for the past 6 weeks.  Patient reports that he is a  and is up on his feet for long hours.  Patient reports that 6 weeks ago he did experience some slight pain in the anterior aspect of the knee but upon getting home he had significant pain anteriorly and difficulty with knee range of motion.  Patient reports that he was unable to bend the knee on its own power.  Patient reports that it then resolved but then upon continuing to return to work he continues to have significant pain about the anterior aspect of the knee.  Patient reports that as long as he is working less than 4 hours the knee typically is okay but anything beyond that he experiences significant pain along with weakness and the inability to extend the leg.  Patient reports that he has tried Voltaren gel as well as prescribed Voltaren orally with minimal improvement.  Patient also has tried a multitude of braces without improvement as well.  Patient reports that as long as he is ambulating with the knee locked straight he does pretty okay but any bending creates pain.  Patient denies any formal physical therapy.  Patient states all of his pain is described as sharp and stabbing point tender to the anterior aspect of the knee pointing to the patellar tendon.  Patient reports he does have history of prior surgery.  When the patient was 9 years old he did sustain a patellar fracture and underwent ORIF.      Pain is improved by rest, ice, and NSAIDS.  Pain is aggravated by stairs, squatting, weight bearing, walking, and standing.    Symptoms include catching, swelling, and  inability to extend and flex the knee.     The patient has tried rest, ice, NSAIDS, bracing, and injection.          Knee Surgical History:  1999- Left knee patellar fracture ORIF    Past Medical, Social and Family History:  Past Medical History[1]  Past Surgical History[2]  Allergies[3]  Medications Ordered Prior to Encounter[4]  Social History     Socioeconomic History    Marital status: Single     Spouse name: Not on file    Number of children: Not on file    Years of education: Not on file    Highest education level: Not on file   Occupational History    Not on file   Tobacco Use    Smoking status: Former     Current packs/day: 1.00     Average packs/day: 1 pack/day for 10.0 years (10.0 ttl pk-yrs)     Types: Cigarettes     Passive exposure: Never    Smokeless tobacco: Never    Tobacco comments:     Patient is vaping   Vaping Use    Vaping status: Never Used   Substance and Sexual Activity    Alcohol use: Yes     Alcohol/week: 3.0 - 5.0 standard drinks of alcohol     Types: 3 - 5 Standard drinks or equivalent per week     Comment: socially     Drug use: No    Sexual activity: Yes     Partners: Female     Comment: She has the shot in her arm.   Other Topics Concern    Not on file   Social History Narrative    Not on file     Social Drivers of Health     Financial Resource Strain: Not on file   Food Insecurity: Not on file   Transportation Needs: Not on file   Physical Activity: Not on file   Stress: Not on file   Social Connections: Not on file   Intimate Partner Violence: Not on file   Housing Stability: Not on file         I have reviewed the past medical, surgical, social and family history, medications and allergies as documented in the EMR.    Review of systems: ROS is negative other than that noted in the HPI.  Constitutional: Negative for fatigue and fever.   HENT: Negative for sore throat.    Respiratory: Negative for shortness of breath.    Cardiovascular: Negative for chest pain.   Gastrointestinal:  "Negative for abdominal pain.   Endocrine: Negative for cold intolerance and heat intolerance.   Genitourinary: Negative for flank pain.   Musculoskeletal: Negative for back pain.   Skin: Negative for rash.   Allergic/Immunologic: Negative for immunocompromised state.   Neurological: Negative for dizziness.   Psychiatric/Behavioral: Negative for agitation.          Objective   Ht 5' 11\" (1.803 m)   Wt 118 kg (260 lb)   BMI 36.26 kg/m²     Physical Exam:    Height 5' 11\" (1.803 m), weight 118 kg (260 lb).    General/Constitutional: NAD, well developed, well nourished  HENT: Normocephalic, atraumatic  CV: Intact distal pulses, regular rate  Resp: No respiratory distress or labored breathing  GI: Soft and non-tender   Lymphatic: No lymphadenopathy palpated  Neuro: Alert and Oriented x 3, no focal deficits  Psych: Normal mood, normal affect, normal judgement, normal behavior  Skin: Warm, dry, no rashes, no erythema      Knee Exam (focused):                RIGHT LEFT   ROM:   0-130 0-130   Palpation: Effusion negative mild     MJL tenderness Negative Negative     LJL tenderness Negative Negative   Meniscus: Katlyn Negative Negative    Apley's Compression Negative Negative   Instability: Varus stable stable     Valgus stable stable   Special Tests: Lachman Negative Negative     Posterior drawer Negative Negative     Anterior drawer Negative Negative     Pivot shift not tested not tested     Dial not tested not tested   Patella: Palpation no tenderness inferior pole ttp and patellar tendon     Mobility 1/4 1/4     Apprehension Negative Negative   Other: Single leg 1/4 squat not tested not tested      LE NV Exam: +2 DP/PT pulses bilaterally  Sensation intact to light touch L2-S1 bilaterally     Bilateral hip ROM demonstrates no pain actively or passively    No calf tenderness to palpation bilaterally    Knee Imaging    MRI of the left knee were reviewed, which demonstrate severe interstital patellar tendonesis.  I have " reviewed the radiology report and agree with their impression.           [1]   Past Medical History:  Diagnosis Date    Diverticulitis     Diverticulitis of colon     Inflammatory bowel disease     No known problems    [2]   Past Surgical History:  Procedure Laterality Date    FRACTURE SURGERY     [3] No Known Allergies  [4]   Current Outpatient Medications on File Prior to Visit   Medication Sig Dispense Refill    azelastine (ASTELIN) 0.1 % nasal spray 2 sprays into each nostril 2 (two) times a day Use in each nostril as directed 30 mL 3    diclofenac (VOLTAREN) 75 mg EC tablet Take 1 tablet (75 mg total) by mouth 2 (two) times a day 60 tablet 3     No current facility-administered medications on file prior to visit.

## 2025-05-30 ENCOUNTER — OFFICE VISIT (OUTPATIENT)
Dept: OBGYN CLINIC | Facility: MEDICAL CENTER | Age: 35
End: 2025-05-30
Payer: COMMERCIAL

## 2025-05-30 VITALS — HEIGHT: 71 IN | BODY MASS INDEX: 36.68 KG/M2 | WEIGHT: 262 LBS

## 2025-05-30 DIAGNOSIS — M76.50 PATELLAR TENDINOSIS: Primary | ICD-10-CM

## 2025-05-30 DIAGNOSIS — Z01.818 PRE-OP TESTING: ICD-10-CM

## 2025-05-30 PROCEDURE — 99214 OFFICE O/P EST MOD 30 MIN: CPT | Performed by: ORTHOPAEDIC SURGERY

## 2025-05-30 RX ORDER — CEFAZOLIN SODIUM 2 G/50ML
2000 SOLUTION INTRAVENOUS ONCE
OUTPATIENT
Start: 2025-05-30 | End: 2025-05-30

## 2025-05-30 RX ORDER — ACETAMINOPHEN 325 MG/1
650 TABLET ORAL EVERY 6 HOURS PRN
OUTPATIENT
Start: 2025-05-30

## 2025-05-30 RX ORDER — CHLORHEXIDINE GLUCONATE ORAL RINSE 1.2 MG/ML
15 SOLUTION DENTAL ONCE
OUTPATIENT
Start: 2025-05-30 | End: 2025-05-30

## 2025-05-30 RX ORDER — TRAMADOL HYDROCHLORIDE 50 MG/1
50 TABLET ORAL EVERY 6 HOURS PRN
OUTPATIENT
Start: 2025-05-30

## 2025-05-30 RX ORDER — SODIUM CHLORIDE, SODIUM LACTATE, POTASSIUM CHLORIDE, CALCIUM CHLORIDE 600; 310; 30; 20 MG/100ML; MG/100ML; MG/100ML; MG/100ML
20 INJECTION, SOLUTION INTRAVENOUS CONTINUOUS
OUTPATIENT
Start: 2025-05-30

## 2025-05-30 NOTE — LETTER
May 30, 2025     Patient: Antwan Lugo  YOB: 1990  Date of Visit: 5/30/2025      To Whom it May Concern:    Antwan Lugo is under my professional care. Antwan was seen in my office on 5/30/2025. Antwan may return to work on 6/2/25 with restrictions of only working 5-6hr shifts at a time, the ability to sit for 5 minutes after standing for 1hr at a time, the ability to wear knee brace as needed, and no pushing, pulling, or lifting greater than 25lb.     If you have any questions or concerns, please don't hesitate to call.         Sincerely,          Chato Mendoza DO        CC: No Recipients

## 2025-05-30 NOTE — H&P (VIEW-ONLY)
Orthopedics Sports Medicine Knee Follow-up Visit    Name: Antwan Lugo      : 1990      MRN: [id number]  Encounter Provider: Chato Mendoza DO  Encounter Date: 2025   Encounter department: [facility]  :  Assessment & Plan  Patellar tendinosis  Assesment:   35 y.o. male left knee severe interstitial patellar tendonesis with PF OA     Plan:    Conservative treatment:    Ice to knee for 20 minutes at least 1-2 times daily.  Post-op PT written  OTC NSAIDS prn for pain.  Crutches will be provided on the day of surgery   Patient has knee immobilizer and will bring with him on the day of surgery   Work note written, , returning with restrictions up until day of surgery      Imaging:    All imaging from today was reviewed by myself and explained to the patient.       Injection:    No Injection planned at this time.      Surgery:     All of the risks and benefits of operative treatment were explained to the patient, as well as the risks and benefits of any alternative treatment options, including nonoperative care. The risks of surgical treatement include, but are not limited to, infection, bleeding, blood clot, neurovascular damage, need for further surgery, continued pain, cardiovascular risk, and anesthesia risk.  The patient understood this and elects to proceed forward with surgical intervention.    We will proceed forward with Left knee open patellar tendon repair with tendinosis resection     Patient Denies history of blood clot. Patient denies personal or family history of bleeding or clotting disorder. Patient does not take any blood thinners. Patient denies cardiac history including none. Patient denies  high blood pressure. Patient does not  see a cardiologist. Patient Denies  current history of diabetes. Patient denies allergies to antibiotics. Patient denies history of MRSA infection. Patient Reports current tobacco use. Discussed with patient use of  narcotics for post-op pain. Patient denies history of opioid abuse.     Medical clearance will not be obtained. Bloodwork will be obtained. EKG will not be obtained.      Follow up:    Return for 1 week post-op with MARTIN Juarez.             History of Present Illness   HPI  Chief Complaint   Patient presents with    Left Knee - Pain       History of Present Illness:    The patient is returns for follow up of Left knee patellar tendinosis.  Since the prior visit, he reports no improvement.  Patient continues to have significant pain about the anterior aspect of the knee.  Patient is looking for a more permanent solution for his knee pain.  Patient has been dealing with his knee pain for a very long time and is uninterested in performing physical therapy that may help with the pain or not and then he would have to have surgery regardless.    Pain is located anterior.     Pain is improved by rest.  Pain is aggravated by prolonged standing and flexion.    Symptoms include clicking, swelling, and locking.     The patient has tried rest, ice, NSAIDS, bracing, and injection.          Knee Surgical History:  1999- Left knee patellar fracture ORIF     Past Medical, Social and Family History:  Past Medical History[1]  Past Surgical History[2]  Allergies[3]  Medications Ordered Prior to Encounter[4]  Social History     Socioeconomic History    Marital status: Single     Spouse name: Not on file    Number of children: Not on file    Years of education: Not on file    Highest education level: Not on file   Occupational History    Not on file   Tobacco Use    Smoking status: Former     Current packs/day: 1.00     Average packs/day: 1 pack/day for 10.0 years (10.0 ttl pk-yrs)     Types: Cigarettes     Passive exposure: Never    Smokeless tobacco: Never    Tobacco comments:     Patient is vaping   Vaping Use    Vaping status: Never Used   Substance and Sexual Activity    Alcohol use: Yes     Alcohol/week: 3.0 - 5.0 standard drinks  "of alcohol     Types: 3 - 5 Standard drinks or equivalent per week     Comment: socially     Drug use: No    Sexual activity: Yes     Partners: Female     Comment: She has the shot in her arm.   Other Topics Concern    Not on file   Social History Narrative    Not on file     Social Drivers of Health     Financial Resource Strain: Not on file   Food Insecurity: Not on file   Transportation Needs: Not on file   Physical Activity: Not on file   Stress: Not on file   Social Connections: Not on file   Intimate Partner Violence: Not on file   Housing Stability: Not on file         I have reviewed the past medical, surgical, social and family history, medications and allergies as documented in the EMR.    Review of systems: ROS is negative other than that noted in the HPI.  Constitutional: Negative for fatigue and fever.   HENT: Negative for sore throat.    Respiratory: Negative for shortness of breath.    Cardiovascular: Negative for chest pain.   Gastrointestinal: Negative for abdominal pain.   Endocrine: Negative for cold intolerance and heat intolerance.   Genitourinary: Negative for flank pain.   Musculoskeletal: Negative for back pain.   Skin: Negative for rash.   Allergic/Immunologic: Negative for immunocompromised state.   Neurological: Negative for dizziness.   Psychiatric/Behavioral: Negative for agitation.          Objective   Ht 5' 11\" (1.803 m)   Wt 119 kg (262 lb)   BMI 36.54 kg/m²     Physical Exam:    Height 5' 11\" (1.803 m), weight 119 kg (262 lb).    General/Constitutional: NAD, well developed, well nourished  HENT: Normocephalic, atraumatic  CV: Intact distal pulses, regular rate  Resp: No respiratory distress or labored breathing  GI: Soft and non-tender   Lymphatic: No lymphadenopathy palpated  Neuro: Alert and Oriented x 3, no focal deficits  Psych: Normal mood, normal affect, normal judgement, normal behavior  Skin: Warm, dry, no rashes, no erythema      Knee Exam (focused):                RIGHT " LEFT   ROM:   0-130 0-130   Palpation: Effusion negative negative     MJL tenderness Negative Negative     LJL tenderness Negative Negative   Meniscus: Katlyn Negative Negative    Apley's Compression Negative Negative   Instability: Varus stable stable     Valgus stable stable   Special Tests: Lachman Negative Negative     Posterior drawer Negative Negative     Anterior drawer Negative Negative     Pivot shift not tested not tested     Dial not tested not tested   Patella: Palpation no tenderness inferior pole ttp and patellar tendon     Mobility 1/4 1/4     Apprehension Negative Negative   Other: Single leg 1/4 squat not tested not tested      LE NV Exam: +2 DP/PT pulses bilaterally  Sensation intact to light touch L2-S1 bilaterally     Bilateral hip ROM demonstrates no pain actively or passively    No calf tenderness to palpation bilaterally    Knee Imaging    MRI of the left knee were reviewed, which demonstrate severe interstital patellar tendonesis.  I have reviewed the radiology report and agree with their impression.              [1]   Past Medical History:  Diagnosis Date    Diverticulitis     Diverticulitis of colon     Inflammatory bowel disease     No known problems    [2]   Past Surgical History:  Procedure Laterality Date    FRACTURE SURGERY     [3] No Known Allergies  [4]   Current Outpatient Medications on File Prior to Visit   Medication Sig Dispense Refill    azelastine (ASTELIN) 0.1 % nasal spray 2 sprays into each nostril 2 (two) times a day Use in each nostril as directed 30 mL 3    diclofenac (VOLTAREN) 75 mg EC tablet Take 1 tablet (75 mg total) by mouth 2 (two) times a day 60 tablet 3     No current facility-administered medications on file prior to visit.

## 2025-05-30 NOTE — PROGRESS NOTES
Orthopedics Sports Medicine Knee Follow-up Visit    Name: Antwan Lugo      : 1990      MRN: [id number]  Encounter Provider: Chato Mendoza DO  Encounter Date: 2025   Encounter department: [facility]  :  Assessment & Plan  Patellar tendinosis  Assesment:   35 y.o. male left knee severe interstitial patellar tendonesis with PF OA     Plan:    Conservative treatment:    Ice to knee for 20 minutes at least 1-2 times daily.  Post-op PT written  OTC NSAIDS prn for pain.  Crutches will be provided on the day of surgery   Patient has knee immobilizer and will bring with him on the day of surgery   Work note written, , returning with restrictions up until day of surgery      Imaging:    All imaging from today was reviewed by myself and explained to the patient.       Injection:    No Injection planned at this time.      Surgery:     All of the risks and benefits of operative treatment were explained to the patient, as well as the risks and benefits of any alternative treatment options, including nonoperative care. The risks of surgical treatement include, but are not limited to, infection, bleeding, blood clot, neurovascular damage, need for further surgery, continued pain, cardiovascular risk, and anesthesia risk.  The patient understood this and elects to proceed forward with surgical intervention.    We will proceed forward with Left knee open patellar tendon repair with tendinosis resection     Patient Denies history of blood clot. Patient denies personal or family history of bleeding or clotting disorder. Patient does not take any blood thinners. Patient denies cardiac history including none. Patient denies  high blood pressure. Patient does not  see a cardiologist. Patient Denies  current history of diabetes. Patient denies allergies to antibiotics. Patient denies history of MRSA infection. Patient Reports current tobacco use. Discussed with patient use of  narcotics for post-op pain. Patient denies history of opioid abuse.     Medical clearance will not be obtained. Bloodwork will be obtained. EKG will not be obtained.      Follow up:    Return for 1 week post-op with MARTIN Juarez.             History of Present Illness   HPI  Chief Complaint   Patient presents with    Left Knee - Pain       History of Present Illness:    The patient is returns for follow up of Left knee patellar tendinosis.  Since the prior visit, he reports no improvement.  Patient continues to have significant pain about the anterior aspect of the knee.  Patient is looking for a more permanent solution for his knee pain.  Patient has been dealing with his knee pain for a very long time and is uninterested in performing physical therapy that may help with the pain or not and then he would have to have surgery regardless.    Pain is located anterior.     Pain is improved by rest.  Pain is aggravated by prolonged standing and flexion.    Symptoms include clicking, swelling, and locking.     The patient has tried rest, ice, NSAIDS, bracing, and injection.          Knee Surgical History:  1999- Left knee patellar fracture ORIF     Past Medical, Social and Family History:  Past Medical History[1]  Past Surgical History[2]  Allergies[3]  Medications Ordered Prior to Encounter[4]  Social History     Socioeconomic History    Marital status: Single     Spouse name: Not on file    Number of children: Not on file    Years of education: Not on file    Highest education level: Not on file   Occupational History    Not on file   Tobacco Use    Smoking status: Former     Current packs/day: 1.00     Average packs/day: 1 pack/day for 10.0 years (10.0 ttl pk-yrs)     Types: Cigarettes     Passive exposure: Never    Smokeless tobacco: Never    Tobacco comments:     Patient is vaping   Vaping Use    Vaping status: Never Used   Substance and Sexual Activity    Alcohol use: Yes     Alcohol/week: 3.0 - 5.0 standard drinks  "of alcohol     Types: 3 - 5 Standard drinks or equivalent per week     Comment: socially     Drug use: No    Sexual activity: Yes     Partners: Female     Comment: She has the shot in her arm.   Other Topics Concern    Not on file   Social History Narrative    Not on file     Social Drivers of Health     Financial Resource Strain: Not on file   Food Insecurity: Not on file   Transportation Needs: Not on file   Physical Activity: Not on file   Stress: Not on file   Social Connections: Not on file   Intimate Partner Violence: Not on file   Housing Stability: Not on file         I have reviewed the past medical, surgical, social and family history, medications and allergies as documented in the EMR.    Review of systems: ROS is negative other than that noted in the HPI.  Constitutional: Negative for fatigue and fever.   HENT: Negative for sore throat.    Respiratory: Negative for shortness of breath.    Cardiovascular: Negative for chest pain.   Gastrointestinal: Negative for abdominal pain.   Endocrine: Negative for cold intolerance and heat intolerance.   Genitourinary: Negative for flank pain.   Musculoskeletal: Negative for back pain.   Skin: Negative for rash.   Allergic/Immunologic: Negative for immunocompromised state.   Neurological: Negative for dizziness.   Psychiatric/Behavioral: Negative for agitation.          Objective   Ht 5' 11\" (1.803 m)   Wt 119 kg (262 lb)   BMI 36.54 kg/m²     Physical Exam:    Height 5' 11\" (1.803 m), weight 119 kg (262 lb).    General/Constitutional: NAD, well developed, well nourished  HENT: Normocephalic, atraumatic  CV: Intact distal pulses, regular rate  Resp: No respiratory distress or labored breathing  GI: Soft and non-tender   Lymphatic: No lymphadenopathy palpated  Neuro: Alert and Oriented x 3, no focal deficits  Psych: Normal mood, normal affect, normal judgement, normal behavior  Skin: Warm, dry, no rashes, no erythema      Knee Exam (focused):                RIGHT " LEFT   ROM:   0-130 0-130   Palpation: Effusion negative negative     MJL tenderness Negative Negative     LJL tenderness Negative Negative   Meniscus: Kaltyn Negative Negative    Apley's Compression Negative Negative   Instability: Varus stable stable     Valgus stable stable   Special Tests: Lachman Negative Negative     Posterior drawer Negative Negative     Anterior drawer Negative Negative     Pivot shift not tested not tested     Dial not tested not tested   Patella: Palpation no tenderness inferior pole ttp and patellar tendon     Mobility 1/4 1/4     Apprehension Negative Negative   Other: Single leg 1/4 squat not tested not tested      LE NV Exam: +2 DP/PT pulses bilaterally  Sensation intact to light touch L2-S1 bilaterally     Bilateral hip ROM demonstrates no pain actively or passively    No calf tenderness to palpation bilaterally    Knee Imaging    MRI of the left knee were reviewed, which demonstrate severe interstital patellar tendonesis.  I have reviewed the radiology report and agree with their impression.              [1]   Past Medical History:  Diagnosis Date    Diverticulitis     Diverticulitis of colon     Inflammatory bowel disease     No known problems    [2]   Past Surgical History:  Procedure Laterality Date    FRACTURE SURGERY     [3] No Known Allergies  [4]   Current Outpatient Medications on File Prior to Visit   Medication Sig Dispense Refill    azelastine (ASTELIN) 0.1 % nasal spray 2 sprays into each nostril 2 (two) times a day Use in each nostril as directed 30 mL 3    diclofenac (VOLTAREN) 75 mg EC tablet Take 1 tablet (75 mg total) by mouth 2 (two) times a day 60 tablet 3     No current facility-administered medications on file prior to visit.

## 2025-05-30 NOTE — LETTER
May 30, 2025     Patient: Antwan Lugo   YOB: 1990   Date of Visit: 5/30/2025       To Whom It May Concern:    Antwan Lugo was seen in my clinic on 5/30/2025 at 1:00 pm. Please excuse Antwan for his absence from work on this day to make the appointment.    If you have any questions or concerns, please don't hesitate to call.         Sincerely,         Chato Mendoza DO        CC: No Recipients

## 2025-06-11 ENCOUNTER — ANESTHESIA EVENT (OUTPATIENT)
Age: 35
End: 2025-06-11
Payer: COMMERCIAL

## 2025-06-12 ENCOUNTER — APPOINTMENT (OUTPATIENT)
Dept: LAB | Facility: CLINIC | Age: 35
End: 2025-06-12
Payer: COMMERCIAL

## 2025-06-12 ENCOUNTER — RESULTS FOLLOW-UP (OUTPATIENT)
Dept: FAMILY MEDICINE CLINIC | Facility: CLINIC | Age: 35
End: 2025-06-12

## 2025-06-12 DIAGNOSIS — H69.90 DYSFUNCTION OF EUSTACHIAN TUBE, UNSPECIFIED LATERALITY: ICD-10-CM

## 2025-06-12 DIAGNOSIS — Z01.818 PRE-OP TESTING: ICD-10-CM

## 2025-06-12 DIAGNOSIS — K63.9 MURAL THICKENING OF SIGMOID COLON: ICD-10-CM

## 2025-06-12 DIAGNOSIS — Z00.00 HEALTHCARE MAINTENANCE: ICD-10-CM

## 2025-06-12 DIAGNOSIS — Z11.4 SCREENING FOR HIV (HUMAN IMMUNODEFICIENCY VIRUS): ICD-10-CM

## 2025-06-12 DIAGNOSIS — R93.5 ABNORMAL CT OF THE ABDOMEN: ICD-10-CM

## 2025-06-12 DIAGNOSIS — H66.002 ACUTE SUPPURATIVE OTITIS MEDIA OF LEFT EAR WITHOUT SPONTANEOUS RUPTURE OF TYMPANIC MEMBRANE, RECURRENCE NOT SPECIFIED: ICD-10-CM

## 2025-06-12 DIAGNOSIS — J30.2 SEASONAL ALLERGIES: ICD-10-CM

## 2025-06-12 DIAGNOSIS — Z13.220 SCREENING FOR LIPID DISORDERS: ICD-10-CM

## 2025-06-12 DIAGNOSIS — E66.01 SEVERE OBESITY (HCC): ICD-10-CM

## 2025-06-12 DIAGNOSIS — Z11.59 NEED FOR HEPATITIS C SCREENING TEST: ICD-10-CM

## 2025-06-12 DIAGNOSIS — M76.50 PATELLAR TENDINOSIS: ICD-10-CM

## 2025-06-12 DIAGNOSIS — Z72.0 TOBACCO ABUSE: ICD-10-CM

## 2025-06-12 LAB
ALBUMIN SERPL BCG-MCNC: 4.4 G/DL (ref 3.5–5)
ALP SERPL-CCNC: 61 U/L (ref 34–104)
ALT SERPL W P-5'-P-CCNC: 54 U/L (ref 7–52)
ANION GAP SERPL CALCULATED.3IONS-SCNC: 11 MMOL/L (ref 4–13)
AST SERPL W P-5'-P-CCNC: 35 U/L (ref 13–39)
BILIRUB SERPL-MCNC: 0.53 MG/DL (ref 0.2–1)
BUN SERPL-MCNC: 12 MG/DL (ref 5–25)
CALCIUM SERPL-MCNC: 9.6 MG/DL (ref 8.4–10.2)
CHLORIDE SERPL-SCNC: 106 MMOL/L (ref 96–108)
CHOLEST SERPL-MCNC: 171 MG/DL (ref ?–200)
CO2 SERPL-SCNC: 23 MMOL/L (ref 21–32)
CREAT SERPL-MCNC: 0.74 MG/DL (ref 0.6–1.3)
ERYTHROCYTE [DISTWIDTH] IN BLOOD BY AUTOMATED COUNT: 12.8 % (ref 11.6–15.1)
GFR SERPL CREATININE-BSD FRML MDRD: 118 ML/MIN/1.73SQ M
GLUCOSE P FAST SERPL-MCNC: 70 MG/DL (ref 65–99)
HCT VFR BLD AUTO: 50 % (ref 36.5–49.3)
HCV AB SER QL: NORMAL
HDLC SERPL-MCNC: 35 MG/DL
HGB BLD-MCNC: 16.6 G/DL (ref 12–17)
HIV 1+2 AB+HIV1 P24 AG SERPL QL IA: NORMAL
LDLC SERPL CALC-MCNC: 109 MG/DL (ref 0–100)
MCH RBC QN AUTO: 29.9 PG (ref 26.8–34.3)
MCHC RBC AUTO-ENTMCNC: 33.2 G/DL (ref 31.4–37.4)
MCV RBC AUTO: 90 FL (ref 82–98)
PLATELET # BLD AUTO: 279 THOUSANDS/UL (ref 149–390)
PMV BLD AUTO: 10.8 FL (ref 8.9–12.7)
POTASSIUM SERPL-SCNC: 4 MMOL/L (ref 3.5–5.3)
PROT SERPL-MCNC: 7.4 G/DL (ref 6.4–8.4)
RBC # BLD AUTO: 5.56 MILLION/UL (ref 3.88–5.62)
SODIUM SERPL-SCNC: 140 MMOL/L (ref 135–147)
TRIGL SERPL-MCNC: 134 MG/DL (ref ?–150)
TSH SERPL DL<=0.05 MIU/L-ACNC: 2.27 UIU/ML (ref 0.45–4.5)
WBC # BLD AUTO: 5.82 THOUSAND/UL (ref 4.31–10.16)

## 2025-06-12 PROCEDURE — 87389 HIV-1 AG W/HIV-1&-2 AB AG IA: CPT

## 2025-06-12 PROCEDURE — 84443 ASSAY THYROID STIM HORMONE: CPT

## 2025-06-12 PROCEDURE — 86803 HEPATITIS C AB TEST: CPT

## 2025-06-12 PROCEDURE — 80061 LIPID PANEL: CPT

## 2025-06-12 PROCEDURE — 80053 COMPREHEN METABOLIC PANEL: CPT

## 2025-06-12 PROCEDURE — 85027 COMPLETE CBC AUTOMATED: CPT

## 2025-06-12 PROCEDURE — 36415 COLL VENOUS BLD VENIPUNCTURE: CPT

## 2025-06-17 NOTE — PRE-PROCEDURE INSTRUCTIONS
Pre-Surgery Instructions:   Medication Instructions    azelastine (ASTELIN) 0.1 % nasal spray Take day of surgery.    diclofenac (VOLTAREN) 75 mg EC tablet Stop taking 3 days prior to surgery.    Medication instructions for day of surgery reviewed. Please take all instructed medications with only a sip of water. Please do not take any over the counter (non-prescribed) vitamins or supplements for one week prior to date of surgery.      You will receive a call one business day prior to surgery with an arrival time and hospital directions. If your surgery is scheduled on a Monday, the hospital will be calling you on the Friday prior to your surgery. If you have not heard from anyone by 8pm, please call the hospital supervisor through the hospital  at [phone]. ([place] [phone] or [place] [phone]).    Do not eat or drink anything after midnight the night before your surgery, including candy, mints, lifesavers, or chewing gum. Do not drink alcohol 24hrs before your surgery. Try not to smoke at least 24hrs before your surgery.       Follow the pre surgery showering instructions as listed in the “My Surgical Experience Booklet” or otherwise provided by your surgeon's office. Do not use a blade to shave the surgical area 1 week before surgery. It is okay to use a clean electric clippers up to 24 hours before surgery. Do not apply any lotions, creams, including makeup, cologne, deodorant, or perfumes after showering on the day of your surgery. Do not use dry shampoo, hair spray, hair gel, or any type of hair products.     No contact lenses, eye make-up, or artificial eyelashes. Remove nail polish, including gel polish, and any artificial, gel, or acrylic nails if possible. Remove all jewelry including rings and body piercing jewelry.     Wear causal clothing that is easy to take on and off. Consider your type of surgery.    Keep any valuables, jewelry, piercings at home. Please bring any specially  ordered equipment (sling, braces) if indicated.    Arrange for a responsible person to drive you to and from the hospital on the day of your surgery. Please confirm the visitor policy for the day of your procedure when you receive your phone call with an arrival time.     Call the surgeon's office with any new illnesses, exposures, or additional questions prior to surgery.    Please reference your “My Surgical Experience Booklet” for additional information to prepare for your upcoming surgery.

## 2025-06-26 ENCOUNTER — ANESTHESIA (OUTPATIENT)
Age: 35
End: 2025-06-26
Payer: COMMERCIAL

## 2025-06-26 ENCOUNTER — HOSPITAL ENCOUNTER (OUTPATIENT)
Age: 35
Setting detail: OUTPATIENT SURGERY
Discharge: HOME | End: 2025-06-26
Attending: ORTHOPAEDIC SURGERY | Admitting: ORTHOPAEDIC SURGERY
Payer: COMMERCIAL

## 2025-06-26 PROBLEM — Z98.890 S/P LEFT KNEE SURGERY: Status: ACTIVE | Noted: 2025-06-26

## 2025-06-26 PROBLEM — M76.50 PATELLAR TENDINOSIS: Status: ACTIVE | Noted: 2025-06-26

## 2025-06-26 RX ORDER — MIDAZOLAM HYDROCHLORIDE 2 MG/2ML
INJECTION, SOLUTION INTRAMUSCULAR; INTRAVENOUS AS NEEDED
Status: DISCONTINUED | OUTPATIENT
Start: 2025-06-26 | End: 2025-06-26

## 2025-06-26 RX ORDER — PROPOFOL 10 MG/ML
INJECTION, EMULSION INTRAVENOUS AS NEEDED
Status: DISCONTINUED | OUTPATIENT
Start: 2025-06-26 | End: 2025-06-26

## 2025-06-26 RX ORDER — DEXAMETHASONE SODIUM PHOSPHATE 10 MG/ML
INJECTION, SOLUTION INTRAMUSCULAR; INTRAVENOUS AS NEEDED
Status: DISCONTINUED | OUTPATIENT
Start: 2025-06-26 | End: 2025-06-26

## 2025-06-26 RX ORDER — FENTANYL CITRATE 50 UG/ML
INJECTION, SOLUTION INTRAMUSCULAR; INTRAVENOUS AS NEEDED
Status: DISCONTINUED | OUTPATIENT
Start: 2025-06-26 | End: 2025-06-26

## 2025-06-26 RX ORDER — HYDROMORPHONE HCL/PF 1 MG/ML
SYRINGE (ML) INJECTION AS NEEDED
Status: DISCONTINUED | OUTPATIENT
Start: 2025-06-26 | End: 2025-06-26

## 2025-06-26 RX ORDER — LIDOCAINE HYDROCHLORIDE 20 MG/ML
INJECTION, SOLUTION EPIDURAL; INFILTRATION; INTRACAUDAL; PERINEURAL AS NEEDED
Status: DISCONTINUED | OUTPATIENT
Start: 2025-06-26 | End: 2025-06-26

## 2025-06-26 RX ORDER — ONDANSETRON 2 MG/ML
INJECTION INTRAMUSCULAR; INTRAVENOUS AS NEEDED
Status: DISCONTINUED | OUTPATIENT
Start: 2025-06-26 | End: 2025-06-26

## 2025-06-26 RX ORDER — KETOROLAC TROMETHAMINE 30 MG/ML
INJECTION, SOLUTION INTRAMUSCULAR; INTRAVENOUS AS NEEDED
Status: DISCONTINUED | OUTPATIENT
Start: 2025-06-26 | End: 2025-06-26

## 2025-06-26 RX ADMIN — CEFAZOLIN SODIUM 2000 MG: 2 SOLUTION INTRAVENOUS at 07:35

## 2025-06-26 RX ADMIN — DEXAMETHASONE SODIUM PHOSPHATE 10 MG: 10 INJECTION INTRAMUSCULAR; INTRAVENOUS at 07:39

## 2025-06-26 RX ADMIN — PROPOFOL 200 MG: 10 INJECTION, EMULSION INTRAVENOUS at 07:39

## 2025-06-26 RX ADMIN — FENTANYL CITRATE 25 MCG: 50 INJECTION INTRAMUSCULAR; INTRAVENOUS at 07:45

## 2025-06-26 RX ADMIN — HYDROMORPHONE HYDROCHLORIDE 0.5 MG: 1 INJECTION, SOLUTION INTRAMUSCULAR; INTRAVENOUS; SUBCUTANEOUS at 08:28

## 2025-06-26 RX ADMIN — DEXMEDETOMIDINE HYDROCHLORIDE 4 MCG: 100 INJECTION, SOLUTION INTRAVENOUS at 08:09

## 2025-06-26 RX ADMIN — DEXMEDETOMIDINE HYDROCHLORIDE 4 MCG: 100 INJECTION, SOLUTION INTRAVENOUS at 08:03

## 2025-06-26 RX ADMIN — ONDANSETRON 4 MG: 2 INJECTION INTRAMUSCULAR; INTRAVENOUS at 08:14

## 2025-06-26 RX ADMIN — FENTANYL CITRATE 25 MCG: 50 INJECTION INTRAMUSCULAR; INTRAVENOUS at 07:42

## 2025-06-26 RX ADMIN — FENTANYL CITRATE 25 MCG: 50 INJECTION INTRAMUSCULAR; INTRAVENOUS at 07:47

## 2025-06-26 RX ADMIN — LIDOCAINE HYDROCHLORIDE 100 MG: 20 INJECTION, SOLUTION EPIDURAL; INFILTRATION; INTRACAUDAL at 07:38

## 2025-06-26 RX ADMIN — KETOROLAC TROMETHAMINE 15 MG: 30 INJECTION, SOLUTION INTRAMUSCULAR at 08:18

## 2025-06-26 RX ADMIN — FENTANYL CITRATE 25 MCG: 50 INJECTION INTRAMUSCULAR; INTRAVENOUS at 07:56

## 2025-06-26 RX ADMIN — MIDAZOLAM 2 MG: 1 INJECTION INTRAMUSCULAR; INTRAVENOUS at 07:35

## 2025-06-26 NOTE — OP NOTE
OPERATIVE REPORT  PATIENT NAME: Antwan Lugo  : 1990  MRN: [id number]  Pt Location:  [facility] OR ROOM 03    Surgery Date: 2025    Surgeons and Role:     * Chato Mendoza DO - Primary     * Ann Arizmendi PA-C - Assisting     Preop Diagnosis:  Patellar tendinosis [M76.50]    Post-Op Diagnosis Codes:     * Patellar tendinosis [M76.50]    Procedure(s):  Left - Patellar tenodesis resection with patellar tendon repair    Specimens:  ID Type Source Tests Collected by Time Destination   1 : Patella Tendon Tissue Tissue Soft Tissue, Other ANAEROBIC CULTURE AND GRAM STAIN, CULTURE, TISSUE AND GRAM STAIN, TISSUE EXAM Chato Mendoza DO 2025 [id number]        Estimated Blood Loss:   Minimal    Drains:  * No LDAs found *    Anesthesia Type:   General     Operative Indications:  Patellar tendinosis [M76.50]    Complications:   None    Chronic Narcotic Use:  No      Procedure and Technique:     Anesthesia: general     Tourniquet Time:  30 min    Blood Loss:  Minimal      Indications: Mr. Lugo is a 35 y.o. male with severe patellar tendinosis of the proximal patella tendon  Due to the patient's MRI findings, active lifestyle, and lack of improvement with a conservative approach, it was recommended that they proceed forward with surgical management of this problem. We reviewed risks and benefits of surgery and a decision was made to proceed with surgery.      Procedure:  In the pre-operative holding area, the patient identified the correct operative extremity and I marked that extremity with my initials, using a permanent marker. The patient was brought to the operating room and positioned supine. Following satisfactory induction of anesthesia, the Left knee was prepped and draped in the usual sterile fashion for surgical arthroscopy of the Left knee. Before any surgical instrumentation was passed to me by the surgical technician, a formalized time-out occurred, which involves the surgeon, circulating nurse,  and anesthesia staff all verifying the correct operative extremity. My initials were visible on the prepped and draped operative field.      Longitudinal incision was made from the inferior pole of the patella two thirds of the way down the tendon for an inch and a half in length.  Subcutaneous hemostasis was obtained with electrocautery and dissection was carried down until the peritenon was encountered.  The peritenon was split and dissected medially and laterally to preserve flaps.  The patella tendon was then observed.    Inspection of the patella tendon revealed degenerative type changes and an incision in line with the patella tendon fibers vertically was made with a deep knife 1/2 cm superior to inferior.  Any unhealthy degenerative looking tissue was removed with sharp dissection and any healthy remaining patella tendon tissue was.  There was an area of cystic tissue collection, not clearly purulent though.  Cultures were sent as well as tissue pathology sent to rule out any infection.  Copious irrigation was performed at this time.  Care was taken to remove any of his unhealthy tissue.      A complex lateral meniscus tear was noted. This tear was associated with   There was no additional pathology. All particulate debris was removed. The knee was copiously rinsed and then drained.  The patella tendon was closed with simple 0 Vicryl stitches.  The peritenon was closed with a running 2-0 Vicryl layer.  The subcutaneous tissue was closed with 2-0 Vicryl and the skin was closed with running 4-0 Monocryl. The skin was cleansed with sterile saline and dried before Steri-Strips were applied. Finally, a sterile dressing was secured by Webril and an Ace wrap.         I was present for the entire procedure., A qualified resident physician was not available., and A physician assistant was required during the procedure for retraction, tissue handling, dissection and suturing.    Patient Disposition:  PACU              SIGNATURE: Chato Mendoza, DO  DATE: June 26, 2025  TIME: 8:34 AM

## 2025-06-26 NOTE — ANESTHESIA PREPROCEDURE EVALUATION
Procedure:  Patellar tenodesis resection with patellar tendon repair (Left: Knee)    Relevant Problems   MUSCULOSKELETAL   (+) Patellar tendinitis of left knee   (+) Post-traumatic osteoarthritis of left knee   (+) Primary osteoarthritis of left knee        Physical Exam    Airway     Mallampati score: II  TM Distance: >3 FB  Neck ROM: full      Cardiovascular      Dental       Pulmonary      Neurological      Other Findings      Anesthesia Plan  ASA Score- 2     Anesthesia Type- general with ASA Monitors.         Additional Monitors:     Airway Plan: LMA and LMA.           Plan Factors-Exercise tolerance (METS): >4 METS.    Chart reviewed.   Existing labs reviewed. Patient summary reviewed.                  Induction- intravenous.    Postoperative Plan- Plan for postoperative opioid use.   Monitoring Plan - Monitoring plan - standard ASA monitoring  Post Operative Pain Plan - plan for postoperative opioid use, peripheral nerve block and multimodal analgesia        Informed Consent- Anesthetic plan and risks discussed with patient.  I personally reviewed this patient with the CRNA. Discussed and agreed on the Anesthesia Plan with the CRNA..      NPO Status:  Vitals Value Taken Time   Date of last liquid 06/25/25 06/26/25 06:00   Time of last liquid 2300 06/26/25 06:00   Date of last solid 06/25/25 06/26/25 06:00   Time of last solid 2030 06/26/25 06:00

## 2025-06-26 NOTE — INTERVAL H&P NOTE
H&P reviewed. After examining the patient I find no changes in the patients condition since the H&P had been written.    Vitals:    06/26/25 0600   BP: 128/72   Pulse: 73   Resp: 14   Temp: 97.6 °F (36.4 °C)   SpO2: 95%

## 2025-06-26 NOTE — ANESTHESIA POSTPROCEDURE EVALUATION
Post-Op Assessment Note    CV Status:  Stable  Pain Score: 7    Pain management: inadequate    Multimodal analgesia used between 6 hours prior to anesthesia start to PACU discharge    Mental Status:  Alert   Hydration Status:  Stable   PONV Controlled:  Controlled   Airway Patency:  Patent  Two or more mitigation strategies used for obstructive sleep apnea   Post Op Vitals Reviewed: Yes    No anethesia notable event occurred.    Staff: CRNA           Last Filed PACU Vitals:  Vitals Value Taken Time   Temp     Pulse 72 06/26/25 08:44   /83 06/26/25 08:41   Resp 13 06/26/25 08:44   SpO2 95 % 06/26/25 08:44   Vitals shown include unfiled device data.

## 2025-06-26 NOTE — DISCHARGE INSTR - AVS FIRST PAGE
POSTOPERATIVE INSTRUCTIONS following KNEE SURGERY    MEDICATIONS:  Resume all home medications unless otherwise instructed by your surgeon.  Pain Medication:  Oxycodone 5 mg, 1 tablet every 4 hours as needed  If you were given a regional anesthetic (nerve block), please begin taking the pain medication as soon as you get home, even if you have minimal or no pain.  DO NOT WAIT FOR THE NERVE BLOCK TO WEAR OFF.  Possible side effects include nausea, constipation, and urinary retention.  If you experience these side effects, please call our office for assistance.  Pain med refills are authorized only during office hours (8am-4pm, Mon-Fri).  Anti-Inflammatory:  Tylenol 325 mg, 1-2 tablets every 6 hours and Ibuprofen 600 mg, 1 tablet every 8 hours  Take with food.  Stop if you experience nausea, reflux, or stomach pain.  Nausea Medication:  None  Fill prescription ONLY if you expericnce severe nausea.  Blood Clot Prevention:  Aspirin 81 mg, 1 tablet twice daily for 2 weeks  Pump your foot up and down 20 times per hour while you are less mobile.    WOUND CARE:  Keep the dressing clean and dry.  Light drainage may occur the first 2 days postop.  You may remove the dressings and get the incision wet in the shower 72 hours after surgery.  DO NOT remove steri-strips or sutures.  DO NOT immerse the incision under water.  Carefully pat the incision dry.  If there is wound drainage, re-apply a fresh dry gauze dressing.  Please call our office ([phone]) if you experience either of the following:  Sudden increase in swelling, redness, or warmth at the surgical site  Excessive incisional drainage that persists beyond the 3rd day after surgery  Oral temperature greater than 101 degrees, not relieved with Tylenol  Shortness of breath, chest pain, nausea, or any other concerning symptoms    SWELLING CONTROL:  Cold Therapy:  The cold therapy device may be used either continuously or only as needed, according to your preference.  Do  "not let the pad directly touch your skin.  Alternatively, apply ice (20 min on, 20 min off) as often as you feel is necessary.  Elevation:  Elevate the entire leg above heart level.  Place pillows under your ankle to keep your knee straight.  Compression:  Apply ACE wraps or a thigh-length compression stocking as needed.    RANGE OF MOTION:  You are LIMITED RANGE OF MOTION 0-90 degrees until you are given permission from your surgeon.    IMMOBILIZATION:  Your knee brace should be WORN AND LOCKED AT ALL TIMES, including sleep.  You may remove the brace only for showering.    ACTIVITY:   BEAR FULL WEIGHT AS TOLERATED on the operative leg.  Use crutches to assist only as needed.  Using Crutches on Stairs:  Going up, lead with your \"good\" (nonoperative) leg.  Going down, lead with your \"bad\" (operative) leg.  Use a hand rail when available.  Knee Extension:  Place a rolled towel or pillow under your ankle for 20-30 minutes 3-5 times per day.  This will help to maintain full knee extension.  Quad Sets:  Sit or lie with your knee straight.  Tighten your quadriceps (front thigh) muscle.  Hold for 3 seconds, then relax.  Repeat 20 times per hour while awake.    PHYSICAL THERAPY:  Begin therapy 5 TO 7 DAYS AFTER SURGERY.  You were given a prescription for therapy at your preoperative office visit.  If you do not have physical therapy scheduled yet, please call our office for assistance.    FOLLOW-UP APPOINTMENT:  7-10 days after surgery with:    HAO DiazO.  [facility]  [address] [place] [address], [address], [place] [address]  [phone]   "

## 2025-07-02 ENCOUNTER — TELEPHONE (OUTPATIENT)
Age: 35
End: 2025-07-02

## 2025-07-02 NOTE — TELEPHONE ENCOUNTER
Patient requesting an Insurance referral for PT at [facility]. Stated he has an appointment for  7/7/25. He does not know the address nor there NPI number.

## 2025-07-03 ENCOUNTER — OFFICE VISIT (OUTPATIENT)
Dept: OBGYN CLINIC | Facility: MEDICAL CENTER | Age: 35
End: 2025-07-03

## 2025-07-03 VITALS — BODY MASS INDEX: 35.84 KG/M2 | HEIGHT: 71 IN

## 2025-07-03 DIAGNOSIS — M76.50 PATELLAR TENDINOSIS: Primary | ICD-10-CM

## 2025-07-03 DIAGNOSIS — D36.9 BENIGN TUMOR: ICD-10-CM

## 2025-07-03 PROCEDURE — 99024 POSTOP FOLLOW-UP VISIT: CPT | Performed by: ORTHOPAEDIC SURGERY

## 2025-07-03 RX ORDER — IBUPROFEN 600 MG/1
TABLET, FILM COATED ORAL EVERY 6 HOURS PRN
COMMUNITY

## 2025-07-03 NOTE — PROGRESS NOTES
Orthopedics Sports Medicine Knee Post Operative Visit    Name: Antwan Lugo      : 1990      MRN: [id number]  Encounter Provider: Chato Mendoza DO  Encounter Date: 7/3/2025   Encounter department: [facility]  :  Assessment & Plan  Patellar tendinosis  Benign tumor      Orders:    Ambulatory Referral to Orthopedic Surgery; Future    Ambulatory Referral to Orthopedic Surgery; Future         Assesment:     35 y.o. male 1 week s/p patellar tenodesis resection with patellar tendon repair, DOS: 25    Plan:  Patient is 1 week status post patellar tenodesis resection with patellar tendon repair.  Patient is to initiate physical therapy where he will be 0-90 for 2 weeks and then progress to full motion 2 weeks after surgery.  I also recommend patient see Dr. 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  Patient will follow-up with me in 4 weeks for reevaluation.    Post-Operative treatment:    Ice to knee for 20 minutes at least 1-2 times daily.  PT for ROM/strengthening to knee, hip and core.  OTC NSAIDS prn for pain.  Tylenol for pain.    Imaging:    No imaging was available for review today.    Weight bearing:  as tolerated     ROM:  0-90 first 2 week s/p sx, after 2 weeks patient may be full range of motion    Brace: Continue knee immobilizer    DVT Prophylaxis:  Ambulation    Follow up:   4 weeks    Patient was advised that if they have any fevers, chills, chest pain, shortness of breath, redness or drainage from the incision, please let our office know immediately.        History of Present Illness   HPI  Chief Complaint   Patient presents with    Left Knee - Post-op       History of Present Illness:    The patient is a 35 y.o. male who is being evaluated post operatively 1 week  status post patellar tenodesis resection with patellar tendon  "repair.    Since the prior visit, He reports significant improvement.     Pain is well controlled.  The patient is using ice to control swelling.    We will start physical therapy next week.     The patient Ambulation for DVT ppx.  The patient has been ambulating without crutches.    The patient has been ambulating with a brace.    The patient denies any fevers, chills, calf pain, chest pain/shortness of breath, redness or drainage from the incision.         I have reviewed the past medical, surgical, social and family history, medications and allergies as documented in the EMR.    Review of systems: ROS is negative other than that noted in the HPI.  Constitutional: Negative for fatigue and fever.     Objective   Ht 5' 11\" (1.803 m)   BMI 35.84 kg/m²      Physical Exam:    Height 5' 11\" (1.803 m).    General/Constitutional: NAD, well developed, well nourished  HENT: Normocephalic, atraumatic  CV: Intact distal pulses, regular rate  Resp: No respiratory distress or labored breathing  Lymphatic: No lymphadenopathy palpated  Neuro: Alert and Oriented x 3, no focal deficits  Psych: Normal mood, normal affect, normal judgement, normal behavior  Skin: Warm, dry, no rashes, no erythema      Knee Exam (focused):                  RIGHT LEFT   ROM:   0-130 0-90   Palpation: Effusion negative negative     MJL tenderness Negative Negative     LJL tenderness Negative Negative   Instability: Varus stable stable     Valgus stable stable   Special Tests: Lachman Negative Negative     Posterior drawer Negative Negative     Anterior drawer Negative Negative     Pivot shift not tested not tested     Dial not tested not tested   Patella: Palpation no tenderness no tenderness     Mobility 1/4 1/4     Apprehension Negative Negative   Other: Single leg 1/4 squat not tested not tested      Incisions show no erythema, no drainage    LE NV Exam: +2 DP/PT pulses bilaterally  Sensation intact to light touch L2-S1 bilaterally     Bilateral hip " ROM demonstrates no pain actively or passively    No calf tenderness to palpation bilaterally    Scribe Attestation      I,:  David Bender am acting as a scribe while in the presence of the attending physician.:       I,:  Chato Mendoza, DO personally performed the services described in this documentation    as scribed in my presence.:

## 2025-07-03 NOTE — LETTER
July 3, 2025     Patient: Antwan Lugo  YOB: 1990  Date of Visit: 7/3/2025      To Whom it May Concern:    Antwan Lugo is under my professional care. Antwan was seen in my office on 7/3/2025. Antwan may due light duty while at work. No lifting pulling or pulling. Allow to have breaks.    If you have any questions or concerns, please don't hesitate to call.         Sincerely,          Chato Mendoza DO        CC: No Recipients

## 2025-07-03 NOTE — ASSESSMENT & PLAN NOTE
Orders:    Ambulatory Referral to Orthopedic Surgery; Future    Ambulatory Referral to Orthopedic Surgery; Future

## 2025-07-03 NOTE — LETTER
July 3, 2025     Patient: Antwan Lugo   YOB: 1990   Date of Visit: 7/3/2025       To Whom It May Concern:    Antwan Lugo was seen in my clinic on 7/3/2025 at 12:15 pm. Please excuse Antwan for his absence from work on this day to make the appointment.  He should work for no more than 6 hours at a time and should be allowed to take a break for 15 to 20 minutes every hour if needed with sitting.  He can go back on Monday, 7/7/2025    If you have any questions or concerns, please don't hesitate to call.         Sincerely,         Chato Mendoza DO        CC: No Recipients

## 2025-07-07 ENCOUNTER — EVALUATION (OUTPATIENT)
Dept: PHYSICAL THERAPY | Facility: CLINIC | Age: 35
End: 2025-07-07
Attending: PHYSICIAN ASSISTANT
Payer: COMMERCIAL

## 2025-07-07 DIAGNOSIS — Z98.890 S/P LEFT KNEE SURGERY: ICD-10-CM

## 2025-07-07 DIAGNOSIS — M76.50 PATELLAR TENDINOSIS: ICD-10-CM

## 2025-07-07 PROCEDURE — 97140 MANUAL THERAPY 1/> REGIONS: CPT | Performed by: PHYSICAL THERAPIST

## 2025-07-07 PROCEDURE — 97161 PT EVAL LOW COMPLEX 20 MIN: CPT | Performed by: PHYSICAL THERAPIST

## 2025-07-07 NOTE — PROGRESS NOTES
PT Evaluation     Today's date: 2025  Patient name: Antwan Lugo  : 1990  MRN: [id number]  Referring provider: Ann Arizmendi,*  Dx:   Encounter Diagnosis     ICD-10-CM    1. Patellar tendinosis  M76.50 Ambulatory Referral to Physical Therapy      2. S/P left knee surgery  Z98.890 Ambulatory Referral to Physical Therapy                     Assessment  Impairments: abnormal gait, abnormal muscle tone, abnormal or restricted ROM, impaired physical strength, lacks appropriate home exercise program and pain with function  Symptom irritability: low    Assessment details: Pt is a 35 y.o. male presenting to PT s/p patellar tendinosis resection and patellar tendon repair, DOS . PT findings include: limited knee ROM, strength/quad activation deficit and gait dysfunction all expected s/p surgery. Pt educated on PT findings, anatomy, biomechanics, POC and rehab course. Pt will benefit from skilled PT to address above impairments to return to PLOF with less restriction and to reach functional goals.   Understanding of Dx/Px/POC: good     Prognosis: good    Goals  1. Pt will demonstrate understanding of HEP and POC in order to improve compliance with course of rehab in 2 weeks.  2. Pt will achieve full knee flexion ROM in 6 weeks    3. Pt will demonstrate 5/5 knee ext MMT allowing pt to return to PLOF by d/c.   4. Pt's pain will be 2/10 or less to allow pt to return to PLOF with least restriction by d/c.     Plan  Patient would benefit from: skilled physical therapy  Planned modality interventions: low level laser therapy    Planned therapy interventions: joint mobilization, manual therapy, neuromuscular re-education, strengthening, stretching, therapeutic activities, therapeutic exercise, home exercise program, functional ROM exercises and flexibility    Frequency: 2x week  Duration in weeks: 8  Treatment plan discussed with: patient    Subjective Evaluation    History of Present Illness  Mechanism  of injury: Pt presents to PT via referral from orthopedic surgery s/p patellar tendinosis resection and patellar tendon repair, DOS . Pt reports that he has been in immobilizer.     Occupation: Pt is working as a .   Quality of life: good    Patient Goals  Patient goals for therapy: increased motion, decreased edema and increased strength    Pain  Current pain ratin  At best pain ratin  At worst pain ratin  Location: L knee  Quality: dull ache and discomfort        Objective    Strength:  NT secondary to s/p surgery    ROM:   Flex: 90°  Ext: 0°     Quad set: visible and palpable contraction with mild superior patellar lift  SLR: poor, quad lag noted    Gait: pt ambulates with knee immobilizer           Precautions: patellar tendinosis resection, patellar tendon repair . Ambulate with knee locked extension until ~4 weeks       Manuals             Knee PROM DL                                                   Neuro Re-Ed                                                                                                        Ther Ex             Quad set HEP            SLR 4 way W/ immobilizer SLR flex no brace if strong quad set           Heel raise HEP            Standing HS curl             Heel slide w/ strap HEP            SAQ NV            Mini squat Consider NV            Bike   ROM 8'                                                   Ther Activity                                       Gait Training                                       Modalities

## 2025-07-09 ENCOUNTER — TELEPHONE (OUTPATIENT)
Dept: OBGYN CLINIC | Facility: MEDICAL CENTER | Age: 35
End: 2025-07-09

## 2025-07-09 ENCOUNTER — APPOINTMENT (OUTPATIENT)
Dept: PHYSICAL THERAPY | Facility: CLINIC | Age: 35
End: 2025-07-09
Attending: PHYSICIAN ASSISTANT
Payer: COMMERCIAL

## 2025-07-09 NOTE — TELEPHONE ENCOUNTER
Left message stating providers message : we would recommend an appointment due to the surgical findings of synovial giant cell tumor. Patient will still follow-up with us post-op but we would like 's input documented. Left office phone number for Pt to return call.      When Pt returns call please schedule appointment with Dr. Maciel.

## 2025-07-11 ENCOUNTER — TELEPHONE (OUTPATIENT)
Age: 35
End: 2025-07-11

## 2025-07-11 NOTE — TELEPHONE ENCOUNTER
Caller: Antwan    Doctor: Dr. Mendoza    Reason for call: patient would like to know if you would still be able to write a work letter keeping him out of work till AUG.4.  Please advise  Thank you  Please place in [facility]    Call back#: [phone]

## 2025-07-14 ENCOUNTER — APPOINTMENT (OUTPATIENT)
Dept: PHYSICAL THERAPY | Facility: CLINIC | Age: 35
End: 2025-07-14
Attending: PHYSICIAN ASSISTANT
Payer: COMMERCIAL

## 2025-07-16 ENCOUNTER — OFFICE VISIT (OUTPATIENT)
Dept: PHYSICAL THERAPY | Facility: CLINIC | Age: 35
End: 2025-07-16
Attending: PHYSICIAN ASSISTANT
Payer: COMMERCIAL

## 2025-07-16 DIAGNOSIS — M76.50 PATELLAR TENDINOSIS: Primary | ICD-10-CM

## 2025-07-16 DIAGNOSIS — Z98.890 S/P LEFT KNEE SURGERY: ICD-10-CM

## 2025-07-16 PROCEDURE — 97110 THERAPEUTIC EXERCISES: CPT | Performed by: PHYSICAL THERAPIST

## 2025-07-16 NOTE — PROGRESS NOTES
"Daily Note     Today's date: 2025  Patient name: Antwan Lugo  : 1990  MRN: [id number]  Referring provider: Ann Arizmendi,*  Dx:   Encounter Diagnosis     ICD-10-CM    1. Patellar tendinosis  M76.50       2. S/P left knee surgery  Z98.890           Start Time: 1015  Stop Time: 1055  Total time in clinic (min): 40 minutes    Subjective: Pt reports that he is doing very well. He is stretching and reports some stiffness in the knee initially but gets full knee bending with a few repetitions      Objective: See treatment diary below      Assessment: Pt tolerates treatment well today. Added submax knee ext isometric at 60° knee flexion angle. Pt with good SLR without quad lag. May initiate closed chained exercise next week and potentially discharge immobilizer. PT will benefit from continued skilled PT.       Plan: Continue per plan of care.      Precautions: patellar tendinosis resection, patellar tendon repair . Ambulate with knee locked extension until ~4 weeks       Manuals            Knee PROM DL                                                   Neuro Re-Ed                                                                                                        Ther Ex             Quad set HEP 20x            SLR 4 way W/ immobilizer SLR no brace 20x ea.            Heel raise HEP            Standing HS curl  HEP           Heel slide w/ strap HEP 6x30\"           SAQ NV 10x            Knee ext isometric  60° knee 10x5\" belt           Mini squat Consider NV            Bike   Recumbent 8'                                                  Ther Activity                                       Gait Training                                       Modalities                                            "

## 2025-07-23 ENCOUNTER — OFFICE VISIT (OUTPATIENT)
Dept: PHYSICAL THERAPY | Facility: CLINIC | Age: 35
End: 2025-07-23
Attending: PHYSICIAN ASSISTANT
Payer: COMMERCIAL

## 2025-07-23 DIAGNOSIS — Z98.890 S/P LEFT KNEE SURGERY: ICD-10-CM

## 2025-07-23 DIAGNOSIS — M76.50 PATELLAR TENDINOSIS: Primary | ICD-10-CM

## 2025-07-23 PROCEDURE — 97110 THERAPEUTIC EXERCISES: CPT | Performed by: PHYSICAL THERAPIST

## 2025-07-23 NOTE — PROGRESS NOTES
"Daily Note     Today's date: 2025  Patient name: Antwan Lugo  : 1990  MRN: [id number]  Referring provider: Ann Arizmendi,*  Dx: No diagnosis found.               Subjective: ***      Objective: See treatment diary below      Assessment: Tolerated treatment {Tolerated treatment :[id number]}. Patient {assessment:[id number]}      Plan: Continue per plan of care.      Precautions: patellar tendinosis resection, patellar tendon repair . Ambulate with knee locked extension until ~4 weeks       Manuals           Knee PROM DL                                                   Neuro Re-Ed                                                                                                        Ther Ex             Quad set HEP 20x            SLR 4 way W/ immobilizer SLR no brace 20x ea.            Heel raise HEP            Standing HS curl  HEP           Heel slide w/ strap HEP 6x30\"           SAQ NV 10x            Knee ext isometric  60° knee 10x5\" belt           Mini squat Consider NV            Bike   Recumbent 8'                                                  Ther Activity                                       Gait Training                                       Modalities                                              "

## 2025-07-23 NOTE — PROGRESS NOTES
"Daily Note     Today's date: 2025  Patient name: Antwan Lugo  : 1990  MRN: [id number]  Referring provider: Ann Arizmendi,*  Dx:   Encounter Diagnosis     ICD-10-CM    1. Patellar tendinosis  M76.50       2. S/P left knee surgery  Z98.890                      Subjective: Pt reports that he is doing well, has obtained full ROM. No pain during exercise.      Objective: See treatment diary below      Assessment: Pt is about 4 weeks s/p surgery. Pt demonstrates strong quad contraction. Per general rehab post patellar tendinosis resection, pt should be okay to discharge immobilizer, however ortho follow up not until 2 more weeks. Advised pt to message physician to confirm if okay to discharge brace. Pt will benefit from continued skilled PT.       Plan: Continue per plan of care.      Precautions: patellar tendinosis resection, patellar tendon repair . Ambulate with knee locked extension until ~4 weeks       Manuals           Knee PROM DL                                                   Neuro Re-Ed                                                                                                        Ther Ex             Quad set HEP 20x  D/c          SLR 4 way W/ immobilizer SLR no brace 20x ea.  20x ea          Heel raise HEP  30x          Standing HS curl  HEP 3# 20x          Heel slide w/ strap HEP 6x30\" D/c (full ROM)          SAQ NV 10x            Knee ext isometric  60° knee 10x5\" belt           SLS   30\"x3          Step up             Wall squat             Mini squat Consider NV  0-45° 2x10          Leg press   Comfortable ROM.           Bike   Recumbent 8' 8'                                                 Ther Activity                                       Gait Training                                       Modalities                                              "

## 2025-07-24 ENCOUNTER — OFFICE VISIT (OUTPATIENT)
Dept: OBGYN CLINIC | Facility: MEDICAL CENTER | Age: 35
End: 2025-07-24
Payer: COMMERCIAL

## 2025-07-24 VITALS — WEIGHT: 261.6 LBS | BODY MASS INDEX: 36.62 KG/M2 | HEIGHT: 71 IN

## 2025-07-24 DIAGNOSIS — D48.19 TENOSYNOVIAL GIANT CELL TUMOR OF KNEE: Primary | ICD-10-CM

## 2025-07-24 PROCEDURE — 99214 OFFICE O/P EST MOD 30 MIN: CPT | Performed by: STUDENT IN AN ORGANIZED HEALTH CARE EDUCATION/TRAINING PROGRAM

## 2025-07-24 NOTE — PROGRESS NOTES
Orthopedic Oncology Surgery Office Note  Antwan Lugo (35 y.o. male)  : 1990 Encounter Date: 2025  Encounter Department: [facility] [place], Orthopedic Surgeon  Orthopedic Oncology & Sarcoma Surgery   Phone:[phone] Fax:[phone]    Assessment, Plan, & Discussion:     :  Assessment & Plan  Tenosynovial giant cell tumor of knee  Patient was sent to me for my orthopedic oncology expertise in the diagnosis and treatment of musculoskeletal tumors    Reviewed physical exam and pathology with patient at time of visit. Pathology findings demonstrate tenosynovial giant cell tumor.   Discussed with the patient that due to the pathology report demonstrating localized type instead of diffuse type, there is no indication to monitor over a long period of time  Discussed that we will follow-up with an MRI in 3 months to monitor for any residual tumor  MRI left knee w woc was ordered at time of visit to be completed in 2 months  Patient will follow-up in the office following MRI for review of results  The patient expresses understanding and is in agreement with today's treatment plan.       Orders:    MRI knee left w wo contrast; Future      Diagnosis:  We have biopsy-proven evidence of the diagnosis as described above, and will continue to manage as such.    Monitoring Schedule:   GCTTS/PVNS: PVNS (Pigmented villonodular synovitis) Surveillance Schedule:  Nodular  Follow-up after MRI for baseline, then PRN post-operatively      Comorbidity, including: N/a    Surgical Planning:   Surgery completed by Dr. Mendoza on 25        Follow Up & Tasks:     Follow up:  No follow-ups on file.     Tasks:   Complete MRI left knee w woc    Next visit:  Review results of MRI left knee w woc  without XR  ___________________________________________________________________________    History of Present Illness:     Antwan Lugo is a 35 y.o. male with history of  "incidental finding of PVNS who presents for orthopedic oncology specialty consultation at the request of Chato Mendoza DO  regarding pathology report s/p left patellar tendinosis performed by Dr. Mendoza on 6/26/25. At time of surgery, tissue was sent to pathology, with finalized pathology results of tenosynovial giant cell tumor of left knee. Patient presents today for review of pathology results and orthopedic oncology consultation for future treatment and monitoring regarding tenosynovial giant cell tumor.     On presentation today, patient states that he is doing well overall. He states that he is doing well in PT and does not have any pain. 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       At baseline patient gaits without assistance.  Denies constitutional symptoms such as fever, chills, night sweats, fatigue, weight gains/losses. Denies  chest pain/shortness of breath.  Patient Denies  personal history of cancer.    Occupation: Works in a kitchen at a restaurant    Review of Systems:   Allergies, medications, past medical/surgical/family/social history have been reviewed.  Complete 12 system review performed and found to be negative except: except as per mentioned in HPI.    Oncology and Treatment History:      Review of referring provider's records:  Referring provider: Chato Mendoza DO  Date: 7/3/25  Impression:  I also recommend patient see Dr. 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       Patient Care team:   Patient Care Team:  Tavares Brand DO as PCP - General (Family Medicine)     Oncology History    No history exists.       Physical Examination:     Height: 5' 11\" (180.3 cm)  Weight - Scale: 119 kg (261 lb 9.6 oz)  BMI (Calculated): 36.5  BSA (Calculated - m2): 2.36 sq meters  Pain " Assessment  Pain Loc: Knee     There were no vitals filed for this visit.  Body mass index is 36.49 kg/m².    General: alert and oriented; no apparent distress.   Psychiatric: normal mood and affect  HEENT: NCAT. Head/neck - full range of motion.  Eyes clear, moist mucus membranes, hearing intact  Cardiovascular: no chest pain, no palpitations, no discernable arrhythmia  Well Perfused peripherally, palpable distal pulse  Lungs: breathing comfortably; equal symmetric chest expansion. No audible wheezing or stridor  Abdomen: soft, non-tender, non-distended. no peritoneal signs  Skin: warm; dry; no lesions, rashes, petechiae or purpura; no clubbing, no cyanosis, no edema    Region: Left knee   Inspection: no edema, skin abnormalities throughout   Palpation: No palpable mass, no tenderness to palpation   Range of motion of joints: WNL range of motion all extremities.   Motor strength: WNL all extremities.   Sensation: grossly intact to all extremities.    Pulses: Intact  Gait: WNL without ambulatory assistive device    Special tests: N/a    Imaging Results:   All images personally reviewed today by Dr. Maciel    Study: XR left knee  Date: 4/30/25  Report: I have read and agree with the radiologist report. and I have personally reviewed the imaging via [facility] PACS (Picture Archiving and Communication System) and my impression is as follows:  IMPRESSION:     No acute osseous abnormality.    Study: MRI left knee [facility]  Date: 4/28/25  Report: I have read and agree with the radiologist report. and I have personally reviewed the imaging via [facility] PACS (Picture Archiving and Communication System) and my impression is as follows:  IMPRESSION:     Interstitial tear of the patellar tendon traversing its entire length.     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      Pathology & Pertinent Laboratory Findings:      Pertinent laboratory findings:    Lab Results   Component Value Date/Time     ALKPHOS 61 06/12/2025 08:28 AM    ALB 4.4 06/12/2025 08:28 AM    TP 7.4 06/12/2025 08:28 AM    WBC 5.82 06/12/2025 08:28 AM    RBC 5.56 06/12/2025 08:28 AM    HGB 16.6 06/12/2025 08:28 AM    HCT 50.0 (H) 06/12/2025 08:28 AM    RDW 12.8 06/12/2025 08:28 AM    LYMPHOPCT 22 06/25/2021 10:12 AM    NEUTROABS 6.33 06/25/2021 10:12 AM    CALCIUM 9.6 06/12/2025 08:28 AM    INR 1.12 08/27/2020 11:27 AM    PROTIME 14.2 08/27/2020 11:27 AM       Pathology: FINAL  Final Diagnosis   TENDON, PATELLA:    - Tenosynovial giant cell tumor; see note.   Electronically signed by Jake Perry MD on 7/1/2025 at 1631 EDT   Note    The non-targeted nature and small size of the tissue precludes differentiating localized from diffuse type. Surgery is the main treatment but tumors often recur, even after complete removal. Correlation with imaging studies as well as with clinical and other laboratory findings is recommended.      Most tumors harbor the CSF1 translocation, among other mutations and chromosomal abnormalities. Tissue is available to send for this testing, if clinically requested.       Microbiology:  Cultures: N/a    Medical, Surgical, Family, and Social History      Past Medical History[1]  Past Surgical History[2]  Current Medications[3]  Allergies[4]  Family History[5]  Social History     Socioeconomic History    Marital status: Single     Spouse name: Not on file    Number of children: Not on file    Years of education: Not on file    Highest education level: Not on file   Occupational History    Not on file   Tobacco Use    Smoking status: Former     Current packs/day: 1.00     Average packs/day: 1 pack/day for 10.0 years (10.0 ttl pk-yrs)     Types: Cigarettes     Passive exposure: Never    Smokeless tobacco: Never    Tobacco comments:     Quit cig smoking 3 yrs ago  and is currently vaping   Vaping Use    Vaping status: Every Day    Substances: Nicotine, THC   Substance and Sexual Activity    Alcohol use: Yes      Alcohol/week: 3.0 - 5.0 standard drinks of alcohol     Types: 3 - 5 Standard drinks or equivalent per week     Comment: socially - 1-2 drinks/week per pt    Drug use: No     Comment: medical card - currently vaping marijuana    Sexual activity: Yes     Partners: Female     Comment: Denies any chest pain or shortness of breath with activity   Other Topics Concern    Not on file   Social History Narrative    Not on file     Social Drivers of Health     Financial Resource Strain: Not on file   Food Insecurity: No Food Insecurity (6/26/2025)    Nursing - Inadequate Food Risk Classification     Worried About Running Out of Food in the Last Year: Not on file     Ran Out of Food in the Last Year: Not on file     Ran Out of Food in the Last Year: Never true   Transportation Needs: No Transportation Needs (6/26/2025)    Nursing - Transportation Risk Classification     Lack of Transportation: Not on file     Lack of Transportation: No   Physical Activity: Not on file   Stress: Not on file   Social Connections: Not on file   Intimate Partner Violence: Unknown (6/26/2025)    Nursing IPS     Feels Physically and Emotionally Safe: Not on file     Physically Hurt by Someone: Not on file     Humiliated or Emotionally Abused by Someone: Not on file     Physically Hurt by Someone: No     Hurt or Threatened by Someone: No   Housing Stability: Unknown (6/26/2025)    Nursing: Inadequate Housing Risk Classification     Has Housing: Not on file     Worried About Losing Housing: Not on file     Unable to Get Utilities: Not on file     Unable to Pay for Housing in the Last Year: No     Has Housing: No       This Visit:     Scribe Attestation      I,:  Simin Bagley am acting as a scribe while in the presence of the attending physician.:       I,:  John Maciel DO personally performed the services described in this documentation    as scribed in my presence.:                       [1]   Past Medical History:  Diagnosis Date     Diverticulitis     Diverticulitis of colon     GERD (gastroesophageal reflux disease)     occasional - diet related    Inflammatory bowel disease     Irritable bowel syndrome     Knee pain, left     more with use per pt   [2]   Past Surgical History:  Procedure Laterality Date    DENTAL SURGERY      Multiple tooth extractions and bone implants    FRACTURE SURGERY Left     knee    PATELLAR TENDON REPAIR Left 2025    Procedure: Patellar tenodesis resection with patellar tendon repair;  Surgeon: Chato Mendoza DO;  Location: [facility];  Service: Orthopedics   [3]   Current Outpatient Medications:     azelastine (ASTELIN) 0.1 % nasal spray, 2 sprays into each nostril 2 (two) times a day Use in each nostril as directed, Disp: 30 mL, Rfl: 3    aspirin (ECOTRIN LOW STRENGTH) 81 mg EC tablet, Take 1 tablet (81 mg total) by mouth in the morning and 1 tablet (81 mg total) before bedtime. Do all this for 21 days., Disp: 42 tablet, Rfl: 0    diclofenac (VOLTAREN) 75 mg EC tablet, Take 1 tablet (75 mg total) by mouth 2 (two) times a day (Patient not taking: Reported on 2025), Disp: 60 tablet, Rfl: 3    ibuprofen (MOTRIN) 600 mg tablet, Take by mouth every 6 (six) hours as needed for mild pain (Patient not taking: Reported on 2025), Disp: , Rfl:     oxyCODONE (ROXICODONE) 5 immediate release tablet, Take 1 tablet (5 mg total) by mouth every 4 (four) hours as needed for moderate pain for up to 15 doses Max Daily Amount: 30 mg (Patient not taking: Reported on 2025), Disp: 15 tablet, Rfl: 0  [4] No Known Allergies  [5]   Family History  Problem Relation Name Age of Onset    Rheum arthritis Mother      Coronary artery disease Father Philip Parish     Dementia Maternal Grandfather Willie Apgar     Anesthesia problems Neg Hx

## 2025-07-30 ENCOUNTER — APPOINTMENT (OUTPATIENT)
Dept: PHYSICAL THERAPY | Facility: CLINIC | Age: 35
End: 2025-07-30
Attending: PHYSICIAN ASSISTANT
Payer: COMMERCIAL

## 2025-08-06 ENCOUNTER — OFFICE VISIT (OUTPATIENT)
Dept: OBGYN CLINIC | Facility: MEDICAL CENTER | Age: 35
End: 2025-08-06

## 2025-08-06 ENCOUNTER — TELEPHONE (OUTPATIENT)
Age: 35
End: 2025-08-06

## 2025-08-06 VITALS — HEIGHT: 71 IN | WEIGHT: 265 LBS | BODY MASS INDEX: 37.1 KG/M2

## 2025-08-06 DIAGNOSIS — D36.9 BENIGN TUMOR: ICD-10-CM

## 2025-08-06 DIAGNOSIS — M76.50 PATELLAR TENDINOSIS: ICD-10-CM

## 2025-08-06 PROCEDURE — 99024 POSTOP FOLLOW-UP VISIT: CPT | Performed by: ORTHOPAEDIC SURGERY
